# Patient Record
Sex: FEMALE | Race: WHITE | Employment: UNEMPLOYED | URBAN - METROPOLITAN AREA
[De-identification: names, ages, dates, MRNs, and addresses within clinical notes are randomized per-mention and may not be internally consistent; named-entity substitution may affect disease eponyms.]

---

## 2018-08-08 PROBLEM — R06.2 INSPIRATORY WHEEZE ON EXAMINATION: Status: ACTIVE | Noted: 2018-08-08

## 2018-10-24 DIAGNOSIS — M89.8X5 PAIN OF LEFT FEMUR: Primary | ICD-10-CM

## 2018-10-25 ENCOUNTER — HOSPITAL ENCOUNTER (OUTPATIENT)
Dept: GENERAL RADIOLOGY | Age: 43
Discharge: HOME | End: 2018-10-27
Payer: COMMERCIAL

## 2018-10-25 ENCOUNTER — OFFICE VISIT (OUTPATIENT)
Dept: ORTHOPEDIC SURGERY | Age: 43
End: 2018-10-25
Payer: COMMERCIAL

## 2018-10-25 VITALS
BODY MASS INDEX: 20.49 KG/M2 | HEIGHT: 64 IN | HEART RATE: 76 BPM | WEIGHT: 120 LBS | DIASTOLIC BLOOD PRESSURE: 94 MMHG | SYSTOLIC BLOOD PRESSURE: 140 MMHG

## 2018-10-25 DIAGNOSIS — M25.552 LEFT HIP PAIN: Primary | ICD-10-CM

## 2018-10-25 DIAGNOSIS — M89.8X5 PAIN OF LEFT FEMUR: ICD-10-CM

## 2018-10-25 DIAGNOSIS — M70.62 TROCHANTERIC BURSITIS, LEFT HIP: ICD-10-CM

## 2018-10-25 PROCEDURE — 20610 DRAIN/INJ JOINT/BURSA W/O US: CPT | Performed by: PHYSICIAN ASSISTANT

## 2018-10-25 PROCEDURE — 99213 OFFICE O/P EST LOW 20 MIN: CPT | Performed by: PHYSICIAN ASSISTANT

## 2018-10-25 PROCEDURE — 6360000002 HC RX W HCPCS

## 2018-10-25 PROCEDURE — 99212 OFFICE O/P EST SF 10 MIN: CPT | Performed by: PHYSICIAN ASSISTANT

## 2018-10-25 PROCEDURE — 2500000003 HC RX 250 WO HCPCS

## 2018-10-25 PROCEDURE — 73552 X-RAY EXAM OF FEMUR 2/>: CPT

## 2018-10-26 RX ORDER — LIDOCAINE HYDROCHLORIDE AND EPINEPHRINE BITARTRATE 20; .01 MG/ML; MG/ML
2.5 INJECTION, SOLUTION SUBCUTANEOUS ONCE
Status: COMPLETED | OUTPATIENT
Start: 2018-10-26 | End: 2018-10-26

## 2018-10-26 RX ORDER — BUPIVACAINE HYDROCHLORIDE 2.5 MG/ML
2 INJECTION, SOLUTION INFILTRATION; PERINEURAL ONCE
Status: COMPLETED | OUTPATIENT
Start: 2018-10-26 | End: 2018-10-26

## 2018-10-26 RX ORDER — TRIAMCINOLONE ACETONIDE 40 MG/ML
40 INJECTION, SUSPENSION INTRA-ARTICULAR; INTRAMUSCULAR ONCE
Status: COMPLETED | OUTPATIENT
Start: 2018-10-26 | End: 2018-10-26

## 2018-10-26 RX ADMIN — LIDOCAINE HYDROCHLORIDE AND EPINEPHRINE BITARTRATE 2.5 ML: 20; .01 INJECTION, SOLUTION SUBCUTANEOUS at 10:01

## 2018-10-26 RX ADMIN — TRIAMCINOLONE ACETONIDE 40 MG: 40 INJECTION, SUSPENSION INTRA-ARTICULAR; INTRAMUSCULAR at 10:00

## 2018-10-26 RX ADMIN — BUPIVACAINE HYDROCHLORIDE 5 MG: 2.5 INJECTION, SOLUTION INFILTRATION; PERINEURAL at 09:59

## 2018-10-26 NOTE — PROGRESS NOTES
bloody. Hematologic\Lymphatic:  negative for bleeding, petechiae,   Genitourinary: Negative for hematuria and difficulty urinating. Musculoskeletal: Negative for neck pain and stiffness. Negative for back pain, see HPI  Skin: Negative for pallor, rash and wound. Neurological: Negative for dizziness, tremors, seizures, weakness, light-headedness, no TIA or stroke symptoms. No numbness and headaches. Psychiatric/Behavioral: Negative. OBJECTIVE:      Physical Examination:   General appearance: alert, well appearing, and in no distress,  normal appearing weight. No visible signs of trauma   Mental status: alert, oriented to person, place, and time, normal mood, behavior, speech, dress, motor activity, and thought processes  Abdomen: soft, nondistended  Resp:   resp easy and unlabored, no audible wheezes note, normal symmetrical expansion of both hemithoraces  Cardiac: distal pulses palpable, skin and extremities well perfused  Neurological: alert, oriented X3, normal speech, no focal findings or movement disorder noted, motor and sensory grossly normal bilaterally, normal muscle tone, no tremors, strength 5/5, normal gait and station  HEENT: normochephalic atraumatic, external ears and eyes normal, sclera normal, neck supple, no nasal discharge. Extremities:   peripheral pulses normal, no edema, redness or tenderness in the calves   Skin: normal coloration, no rashes or open wounds, no suspicious skin lesions noted  Psych: Affect euthymic   Musculoskeletal:   Extremity:  left Lower Extremity Exam:    Incision over left hip, well healed, no signs of infection   Skin intact, not broken down. No erythema/induration/fluctuence/warmth present. Minimal swelling present over the greater trochanteric bursa, over incision. No ecchymosis present. Tender to palpation over greater trochanteric bursa, none over the groin, or anterior thigh.    Active range of motion 0-100 degrees of left knee, no pain with hip ROM,

## 2021-08-20 ENCOUNTER — TELEPHONE (OUTPATIENT)
Dept: ORTHOPEDIC SURGERY | Age: 46
End: 2021-08-20

## 2021-08-20 DIAGNOSIS — S72.102D CLOSED PERTROCHANTERIC FRACTURE OF LEFT FEMUR WITH ROUTINE HEALING, SUBSEQUENT ENCOUNTER: Primary | ICD-10-CM

## 2021-09-09 ENCOUNTER — HOSPITAL ENCOUNTER (OUTPATIENT)
Dept: GENERAL RADIOLOGY | Age: 46
Discharge: HOME | End: 2021-09-11
Payer: COMMERCIAL

## 2021-09-09 ENCOUNTER — OFFICE VISIT (OUTPATIENT)
Dept: ORTHOPEDIC SURGERY | Age: 46
End: 2021-09-09
Payer: COMMERCIAL

## 2021-09-09 DIAGNOSIS — S72.102D CLOSED PERTROCHANTERIC FRACTURE OF LEFT FEMUR WITH ROUTINE HEALING, SUBSEQUENT ENCOUNTER: ICD-10-CM

## 2021-09-09 DIAGNOSIS — S72.142S CLOSED INTERTROCHANTERIC FRACTURE OF HIP, LEFT, SEQUELA: ICD-10-CM

## 2021-09-09 DIAGNOSIS — M16.32 OSTEOARTHRITIS RESULTING FROM HIP DYSPLASIA ON ONE SIDE, LEFT: Primary | ICD-10-CM

## 2021-09-09 PROCEDURE — G8428 CUR MEDS NOT DOCUMENT: HCPCS | Performed by: PHYSICIAN ASSISTANT

## 2021-09-09 PROCEDURE — 99204 OFFICE O/P NEW MOD 45 MIN: CPT | Performed by: PHYSICIAN ASSISTANT

## 2021-09-09 PROCEDURE — 73552 X-RAY EXAM OF FEMUR 2/>: CPT

## 2021-09-09 PROCEDURE — G8421 BMI NOT CALCULATED: HCPCS | Performed by: PHYSICIAN ASSISTANT

## 2021-09-09 PROCEDURE — 4004F PT TOBACCO SCREEN RCVD TLK: CPT | Performed by: PHYSICIAN ASSISTANT

## 2021-09-09 PROCEDURE — 72170 X-RAY EXAM OF PELVIS: CPT

## 2021-09-09 PROCEDURE — 99212 OFFICE O/P EST SF 10 MIN: CPT | Performed by: PHYSICIAN ASSISTANT

## 2021-09-09 NOTE — PROGRESS NOTES
Jovanny Brasher is a 55 y.o. female who presents for follow up of L Hip and Left Knee Pain    SURGEON: Dr. Abby Buenrostro, DO  Date last seen in office: 10/25/2018    Symptoms: worse  New complaints: Pt expressed having more difficulty with Left hip compared to Left Knee. Pt has a hard time with ADL's due to increase pain of FWB. Weightbearing: left lower Partial weight bearing      Assistive device Straight cane  Participating in therapy (location if yes)?  no    Refills Needed: None  Order/Referral Needed: no

## 2021-09-09 NOTE — PATIENT INSTRUCTIONS
You will need to be medically cleared before surgery by your family doctor. Please call your doctor to set up an appointment for medical clearance as soon as possible and have the office fax your medical clearance to :   Selena CalderaMike [phone]   ATTN:  1 Steve Orozco    Call internal medicine center [phone] for appointment    Pre op therapy- [facility]    1. Your surgery is scheduled for Left Hip CIARA Total Arthroplasty when medically cleared with Dr. Mateusz Humphrey, DO at the [address] will need to report to Preop area  that morning at 90 minutes before surgery. 2. Plan to stay overnight after surgery  3. Preadmission Testing (PAT) department at [facility] will contact you with all the details prior to surgery. 4. Nothing to eat or drink after midnight the night before surgery. You may take a pain pill and any other medicine PAT instructs you to take with small sip of water if needed. 5. You will need to attend Joint Education Class prior to surgery- Tuesday AM from 10-11 am  6. Continue with ice and elevation to reduce swelling  7. Weightbearing as tolerated left lower, use assistive devices as needed  8. Take pain medicine as instructed  9. Call office with any question or concerns: [phone]. Hold Lovenox/Aspirin the day of surgery. Hold all NSAIDs 7 days prior to surgery  11. Pre Op COVID 19 testing    ALL TOTAL JOINT PATIENTS WILL BE WEANED OFF ANY NARCOTIC MEDICATION GIVEN POST OPERATIVELY BY AT THE LATEST 3 WEEKS FROM DATE OF SURGERY    Due to increased risk of infections, it is important to plan for getting treatment for significant dental diseases (including tooth extractions, root canal and periodontal work) before your total joint surgery.   Routine teeth cleaning should be delayed until you are 3 months post op

## 2021-09-10 NOTE — PROGRESS NOTES
Orthopaedic H&P Note    Jovanny rBasher is a 55 y.o. female, her YOB: 1975 with the following history as recorded in [facility]:      Patient Active Problem List    Diagnosis Date Noted    Osteoarthritis resulting from hip dysplasia on one side, left 09/09/2021    Closed intertrochanteric fracture of hip, left, sequela 09/09/2021    Inspiratory wheeze on examination 08/08/2018     Current Outpatient Medications   Medication Sig Dispense Refill    baclofen (LIORESAL) 10 MG tablet Take 2 tablets by mouth 3 times daily 180 tablet 2    traMADol (ULTRAM) 50 MG tablet Take 1 tablet by mouth every 8 hours as needed for Pain for up to 90 days. 90 tablet 2    oxybutynin (DITROPAN-XL) 5 MG extended release tablet Take 1 tablet by mouth daily 90 tablet 1     No current facility-administered medications for this visit. Allergies: Patient has no known allergies. No past medical history on file. Past Surgical History:   Procedure Laterality Date    HIP SURGERY Left 07/09/2016    IM NAILING LEFT HIP    TUBAL LIGATION       No family history on file. Social History     Tobacco Use    Smoking status: Current Every Day Smoker     Packs/day: 0.50     Types: Cigarettes    Smokeless tobacco: Never Used    Tobacco comment: quit smoking   Substance Use Topics    Alcohol use: No                             Chief Complaint   Patient presents with    Hip Pain     8/10 pain. Pt expressed having consistent pain. Pt has pain in middle lower back. Pt has numbness and tingling in Left Hip. Negative crepitus.  Knee Pain     5/10 pain. Pt expressed pain is only present with ambulation. SUBJECTIVE: Jovanny Brasher is here today for their left Hip. The patient has had pain for sometime, but last 6 months or so it has become more severe. 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  She does have history of L hip intertrochanteric fracture treated with CMN in 2016 with Dr. Venita Haddad. Virginia Byrnes has tried multiple conservative treatment. Has had therapy in the past, that worked at first, but the pain persisted. Patient has chronically been managed by physiatrist for pain management, bracing has been attempted to the knee. She has had appropriate modifications to the shoe for leg length discrepancy, ambulates with a SPC at all times. The pain is described as typical arthritic pain, achy in the joint, becoming more severe with stairs and any twisting motion of the left Hip. Rest makes the left Hip better along with NSAIDs and over the counter analgesics, but states they are now less effective. Patient can ambulate less than 1 block prior to pain limiting their function. Virginia Byrnes is having difficulty with ADLs, and states this pain is limiting here activity decreasing their quality of life. She would like to discuss ROBEL. Review of Systems   Constitutional: Negative for fever, chills, diaphoresis, appetite change and fatigue. HENT: Negative for dental issues, hearing loss and tinnitus. Negative for congestion, sinus pressure, sneezing, sore throat. Negative for headache. Eyes: Negative for visual disturbance, blurred and double vision. Negative for pain, discharge, redness and itching  Respiratory: Negative for cough, shortness of breath and wheezing. Cardiovascular: Negative for chest pain, palpitations and leg swelling. No dyspnea on exertion   Gastrointestinal:   Negative for nausea, vomiting, abdominal pain, diarrhea, constipation  or black or bloody. Hematologic\Lymphatic:  negative for bleeding, petechiae,   Genitourinary: Negative for hematuria and difficulty urinating. Musculoskeletal: Negative for neck pain and stiffness. Mild for back pain, negative joint swelling and gait problem. Skin: Negative for pallor, rash and wound.    Neurological: Negative for dizziness, tremors, seizures, weakness, light-headedness, no TIA or stroke symptoms. No numbness and headaches. Psychiatric/Behavioral: Negative. Physical Examination:   General appearance: alert, well appearing, and in no distress,  normal appearing weight  Mental status: alert, oriented to person, place, and time, normal mood, behavior, speech, dress, motor activity, and thought processes  Abdomen: soft, nondistended   Resp:   resp easy and unlabored, no audible wheezes note  Cardiac: distal pulses palpable, skin well perfused  Neurological: alert, oriented X3, normal speech, no focal findings or movement disorder noted, motor and sensory grossly normal bilaterally, normal muscle tone, no tremors, strength 5/5, normal gait and station  HEENT: normochephalic atraumatic, external ears and eyes normal, sclera normal, neck supple  Extremities:   peripheral pulses normal, no edema, redness or tenderness in the calves   Skin: normal coloration, no rashes or open wounds, no suspicious skin lesions noted  Psych: Affect euthymic   Musculoskeletal:    Extremity:  left Lower Extremity  ·  Skin is intact circumferentially  · Previous surgical incisions well healed  · Genu valgum to the LLE  · approximately 2\" shortening on the left lower extremity  · +TTP to the left hemipelvis  · Pain with IR/ER of the left hip  · Compartments soft and compressible  · Palpable dorsalis pedis and posterior tibialis pulse, brisk cap refill to toes, foot warm and perfused  · Sensation intact to light touch in sural/deep peroneal/superficial peroneal/saphenous/posterior tibial nerve distributions to foot/ankle  · Demonstrates active ankle plantar/dorsiflexion/great toe extension       XR: 9/9/21- AP pelvis and 2 views of the left femur demonstrates intact CMN at the left femur. Dysplastic appearing left hip with shallow left acetabulum and flattened left femoral head. Degenerative arthropathy present as well. ASSESSMENT:     Diagnosis Orders   1.  Osteoarthritis resulting from hip dysplasia on one side, left  CT HIP LEFT WO CONTRAST    [facility]   2. Closed intertrochanteric fracture of hip, left, sequela         Discussion:  The patient would like to proceed with total left Hip arthroplasty when cleared by their PCP. Discussed use of the CIARA for her procedure given her underlying dysplastic acetabulum. Carmenmarleni Echeverria understands the risks include but are not limited to infection, injuries to the blood vessel and nerves, bleeding, continued pain and non relief of pain, aseptic loosening dislocation, limb length discrepancy, arthrofibrosis of the joint, further operation, deep venous thrombosis, pulmonary embolism and fracture, heart attack and death. Татьяна operative plan discussed, need for anticoagulation for DVT/PE prophylaxis, need for physical therapy, office follow up visits, and possible rehab stay. It was also explained patient will need to take a prophylactic dose of ATB prior to any bowel, dental or mouth procedures, including dental cleaning, for length of the implant. Did review surgery prosthesis with model with patient as well. Pain control was also discussed and the expectation is to have patient off narcotic pain meds around 3 weeks post operatively for a total joint arthroplasty     Elective Orthopedic Surgery Checklist:    [x] Nicotine cessation education- If nonunion surgery, needs negative Nicotine blood work  [] Established with a PCP-- patient to re-establish with [address]    [x] No pending dental treatments prior to total joint  [x] Joint Education Class Needed   [] Any other areas of concern that need addressed prior to surgery: NA        PLAN:  You will need to be medically cleared before surgery by your family doctor.  Please call your doctor to set up an appointment for medical clearance as soon as possible and have the office fax your medical clearance to :   [phone]   ATTN:  1 Steve Orozco    Call internal medicine center [phone] for appointment    Pre op therapy- [facility]    1. Your surgery is scheduled for Left Hip CIARA Total Arthroplasty when medically cleared with Dr. Lorri Garcia, DO at the main [address] [place] [address] will need to report to Preop area  that morning at 90 minutes before surgery. 2. Plan to stay overnight after surgery  3. Preadmission Testing (PAT) department at [address] [place] [address] will contact you with all the details prior to surgery. 4. Nothing to eat or drink after midnight the night before surgery. You may take a pain pill and any other medicine PAT instructs you to take with small sip of water if needed. 5. You will need to attend Joint Education Class prior to surgery- Tuesday AM from 10-11 am  6. Continue with ice and elevation to reduce swelling  7. Weightbearing as tolerated left lower, use assistive devices as needed  8. Take pain medicine as instructed  9. Call office with any question or concerns: [phone]. Hold Lovenox/Aspirin the day of surgery. Hold all NSAIDs 7 days prior to surgery  11. Pre Op COVID 19 testing  Planning for post op DVT prophylaxis with Aspirin as well as nonpharmacologic use of Home SCDs  Patient to also to be supplied with cryotherapy with compression post operatively. Electronically signed by Yandel Vivas PA-C on 9/10/2021 at 9:41 AM  Note: This report was completed using [employer] voiced recognition software. Every effort has been made to ensure accuracy; however, inadvertent computerized transcription errors may be present.

## 2021-09-23 ENCOUNTER — HOSPITAL ENCOUNTER (OUTPATIENT)
Dept: PHYSICAL THERAPY | Age: 46
Setting detail: THERAPIES SERIES
Discharge: HOME | End: 2021-09-23
Payer: COMMERCIAL

## 2021-09-23 PROCEDURE — 97161 PT EVAL LOW COMPLEX 20 MIN: CPT | Performed by: PHYSICAL THERAPIST

## 2021-09-23 ASSESSMENT — PAIN DESCRIPTION - FREQUENCY: FREQUENCY: CONTINUOUS

## 2021-09-23 ASSESSMENT — PAIN DESCRIPTION - LOCATION: LOCATION: HIP;KNEE;LEG

## 2021-09-23 ASSESSMENT — PAIN DESCRIPTION - DESCRIPTORS: DESCRIPTORS: ACHING;SHARP

## 2021-09-23 ASSESSMENT — PAIN SCALES - GENERAL: PAINLEVEL_OUTOF10: 7

## 2021-09-23 ASSESSMENT — PAIN DESCRIPTION - PAIN TYPE: TYPE: CHRONIC PAIN

## 2021-09-23 ASSESSMENT — PAIN DESCRIPTION - ORIENTATION: ORIENTATION: LEFT

## 2021-09-23 NOTE — PROGRESS NOTES
Physical Therapy  Initial Assessment  Date: 2021  Patient Name: Jen Blackmon  MRN: [id number]  : 1975          Restrictions       Subjective   General  Chart Reviewed: Yes  Referring Practitioner: Haley  Diagnosis: M16.32 (ICD-10-CM) - Osteoarthritis resulting from hip dysplasia on one side, left  PT Visit Information  PT Insurance Information: [facility]  Total # of Visits to Date: 1  Subjective  Subjective: Patient presents to PT with c/o L hip pain. Patient reports that this a chronic condition that is progressing. She explains that she was injured in [address] [place] [address] which resulted in hindered mechanics of LLE. She reports she adapted to the hindered gait however fell in  which resulted in L femur fracture. Since the femur fracture, symptoms have worsened. She cont to ambulate with SC. Pain remains lateral hip with radiating pain into knee region. Patient does admit to occasional buckling of knee. Falls do occur with last fall approx 5-6 days ago. Pt takes tramadol for pain relief. Pain Screening  Patient Currently in Pain: Yes  Pain Assessment  Pain Assessment: 0-10  Pain Level: 7 (7-10/10)  Pain Type: Chronic pain  Pain Location: Hip;Knee;Leg  Pain Orientation: Left  Pain Descriptors: Aching; Sharp  Pain Frequency: Continuous  Vital Signs  Patient Currently in Pain: Yes    Objective     Observation/Palpation  Palpation: pain with palpation across LS region with significant pain across lateral aspect L hip  Observation: generalized atrophy noted across LLE  Edema: no obvious edema    AROM RLE (degrees)  RLE AROM: WFL  AROM LLE (degrees)  LLE General AROM: L hip- WFL; L knee flexion- WFL; L knee ext- -15*; L ankle PF- WFL ; L ankle DF -30* (-17* passive)  Spine  Lumbar: WFL    Strength RLE  Strength RLE: WFL  Strength LLE  Comment: L hip flex/add- grossly 4/5; L hip abd- 3 to 3+/5; L knee- grossly 4/5; L ankle 3- to 3/5 WARs     Additional Measures  Flexibility: Limited L hamstring flexibility hindering knee and ankle motions  Sensation  Overall Sensation Status: WFL             Ambulation  Ambulation?: Yes  Ambulation 1  Device: Single point cane  Assistance: Modified Independent  Quality of Gait: Patient ambulates with SC: significant L knee flexion with ankle PF throughout; limited step-to mechanics throughout  Balance  Sitting - Static: Good  Sitting - Dynamic: Good  Standing - Static: Good  Standing - Dynamic: Good;-                         Assessment   Conditions Requiring Skilled Therapeutic Intervention  Body structures, Functions, Activity limitations: Decreased functional mobility ; Decreased ROM; Decreased strength; Increased pain  Assessment: pt presents to PT due to L hip pain/limited LLE function; pt to have PT prior to ROBEL to improve ROM/strength of LLE to improve post op outcomes  Prognosis: Fair  Decision Making: Low Complexity  REQUIRES PT FOLLOW UP: Yes         Plan   Plan  Times per week: 2x/week x 6 weeks for aquatic therapy  Current Treatment Recommendations: Aquatics    Goals  Short term goals  Time Frame for Short term goals: 3 weeks  Short term goal 1: increase strength of LLE by 1/2 manual muscle grade to improve overall functions  Short term goal 2: increase L hamstring flexibility allowing for increased L Knee ext to < -10* to improve functional gait  Short term goal 3: decrease pain to < 5/10 across LLE with activity  Long term goals  Time Frame for Long term goals : 6 weeks  Long term goal 1: increase strength of LLE by 1 manual muscle grade to improve overall functions  Long term goal 2: increase L hamstring flexibility allowing for increased L Knee ext to < -5* to improve functional gait  Long term goal 3: decrease pain to < 3/10 across LLE with activity  Long term goal 4: pt demonstrates independence with HEP  Patient Goals   Patient goals : to reduce left LE pain       Therapy Time   Individual Concurrent Group Co-treatment   Time In 1005         Time Out 1050 Minutes Wine 47, PT

## 2021-10-26 ENCOUNTER — HOSPITAL ENCOUNTER (OUTPATIENT)
Dept: PHYSICAL THERAPY | Age: 46
Setting detail: THERAPIES SERIES
Discharge: HOME | End: 2021-10-26
Payer: COMMERCIAL

## 2021-10-26 PROCEDURE — 97113 AQUATIC THERAPY/EXERCISES: CPT

## 2021-10-26 NOTE — PROGRESS NOTES
[facility]  Phone: [phone] Fax: [phone]        Physical Therapy Aquatic Flow Sheet   Date:  10/26/2021    Patient Name:  Yeison Young    :  1975  Restrictions/Precautions:    Diagnosis:  M16.32 (ICD-10-CM) - Osteoarthritis resulting from hip dysplasia on one side, left  Treatment Diagnosis:  M16.32 (ICD-10-CM) - Osteoarthritis resulting from hip dysplasia on one side, left  [facility]   Referring Physician:  Haley  Time In:1030  Time Out:1130    Subjective:Patient presents to PT with c/o L hip pain. Patient reports that this a chronic condition that is progressing. She explains that she was injured in [address] in  which resulted in hindered mechanics of LLE. She reports she adapted to the hindered gait however fell in  which resulted in L femur fracture. Since the femur fracture, symptoms have worsened. She cont to ambulate with SC. Pain remains lateral hip with radiating pain into knee region. Patient does admit to occasional buckling of knee. Falls do occur with last fall approx 5-6 days ago. Pt takes tramadol for pain relief.       Key  B= Belt DB= Dumbells T= Theratube   H= Hydrotone N= Noodles W= Weights   P= Paddles S= Speedo equipment K= Kickboard     Exercises/Activities   Warm-up/Amb Minutes  Exercises      Slow forward   5  HR/TR      Slow sideways  5  Marches      Slow backwards  5  Mini-squats      Medium forward    4-way SLR      Medium sideways    Hip circles/fig 8      Small shuffle    Hamstring curls      Jog    Knee extension  5    Braiding    Pelvic tilts      Bicycling  5  Scap squeezes      Marching  5  Shoulder flex/ext      Functional    Shoulder abd/add      Step    Shoulder H. abd/add      Lifting    Shoulder IR/ER      Hand to opp knee    Rowing      Push down squat    Bilateral pull down      UE PNF    Push/pull      LE PNF    Push downs      Wall push ups    Arm circles      SLS    Elbow flex/ext          Chin tuck      Stretching    UT shrugs/rolls      Gastroc/Soleus    Rocking horse      Hamstring          SKTC    Other      Piriformis          Hip flexor          Ladder pull          Pec stretch          Post deltoid           Timed Code Treatment Minutes:  30    Total Treatment Minutes:  30    Treatment/Activity Tolerance:  [] Patient tolerated treatment well [x] Patient limited by fatique  [x] Patient limited by pain [] Patient limited by other medical complications  [x] Other: LLE General AROM: L hip- WFL; L knee flexion- WFL; L knee ext- -15*; L ankle PF- WFL ; L ankle DF -30* (-17* passive)    PQuality of Gait: Patient ambulates with SC: significant L knee flexion with ankle PF throughout; limited step-to mechanics throughout  Prognosis: [] Good [x] Fair  [] Poor    Patient Requires Follow-up: [x] Yes  [] No    Plan:   [x] Continue per plan of care [] Alter current plan (see comments)  [] Plan of care initiated [] Hold pending MD visit [] Discharge    Treatment Charges: Mins Units   Initial Evaluation     Re-Evaluation     Ther Exercise         TE     Aquatic Treatment 30 2   Ther Activities        TA     Gait Training          GT     Neuro Re-education NR     Modalities     Non-Billable Service Time     Other     Total Time/Units 30 2       Electronically signed by:  Ashley Urbina PTA [id number]

## 2021-10-28 ENCOUNTER — HOSPITAL ENCOUNTER (OUTPATIENT)
Dept: PHYSICAL THERAPY | Age: 46
Setting detail: THERAPIES SERIES
Discharge: HOME | End: 2021-10-28
Payer: COMMERCIAL

## 2021-10-28 PROCEDURE — 97113 AQUATIC THERAPY/EXERCISES: CPT

## 2021-10-28 NOTE — PROGRESS NOTES
[facility]  Phone: [phone] Fax: [phone]        Physical Therapy Aquatic Flow Sheet   Date:  10/28/2021    Patient Name:  Joey Maria    :  1975  Restrictions/Precautions:    Diagnosis:  M16.32 (ICD-10-CM) - Osteoarthritis resulting from hip dysplasia on one side, left  Treatment Diagnosis:  M16.32 (ICD-10-CM) - Osteoarthritis resulting from hip dysplasia on one side, left  Insurance/Certification information: [facility]   Referring Physician:  Haley  Time In:1030  Time Out:1130  2x/week x 6 weeks for aquatic therapy    Key  B= Belt DB= Dumbells T= Theratube   H= Hydrotone N= Noodles W= Weights   P= Paddles S= Speedo equipment K= Kickboard     Exercises/Activities   Warm-up/Amb Minutes  Exercises  Minutes    Slow forward  5  HR/TR  5    Slow sideways  5  Marches  5    Slow backwards  5  Mini-squats  5    Medium forward    4-way SLR  5    Medium sideways    Hip circles/fig 8  5    Small shuffle    Hamstring curls  5    Jog    Knee extension  5    Braiding    Pelvic tilts  5    Bicycling  5  Scap squeezes      Marching  5  Shoulder flex/ext      Functional    Shoulder abd/add      Step    Shoulder H. abd/add      Lifting    Shoulder IR/ER      Hand to opp knee    Rowing      Push down squat    Bilateral pull down      UE PNF    Push/pull      LE PNF    Push downs      Wall push ups    Arm circles      SLS    Elbow flex/ext          Chin tuck      Stretching    UT shrugs/rolls      Gastroc/Soleus    Rocking horse      Hamstring          SKTC    Other      Piriformis          Hip flexor          Ladder pull          Pec stretch          Post deltoid           Timed Code Treatment Minutes:  60    Total Treatment Minutes: 60    Treatment/Activity Tolerance:  [] Patient tolerated treatment well [x] Patient limited by fatique  [x] Patient limited by pain [] Patient limited by other medical complications  [x] Other: LLE General AROM: L hip- WFL; L knee flexion- WFL; L knee ext- -15*; L ankle PF- WFL ; L ankle DF -30* (-17* passive)    PQuality of Gait: Patient ambulates with SC: significant L knee flexion with ankle PF throughout; limited step-to mechanics throughout  Prognosis: [] Good [x] Fair  [] Poor    Patient Requires Follow-up: [x] Yes  [] No    Plan:   [x] Continue per plan of care [] Alter current plan (see comments)  [] Plan of care initiated [] Hold pending MD visit [] Discharge    Treatment Charges: Mins Units   Initial Evaluation     Re-Evaluation     Ther Exercise         TE     Aquatic Treatment 60 4   Ther Activities        TA     Gait Training          GT     Neuro Re-education NR     Modalities     Non-Billable Service Time     Other     Total Time/Units 60 4       Electronically signed by:  Kirsten Ivy PTA [id number]

## 2021-11-04 ENCOUNTER — APPOINTMENT (OUTPATIENT)
Dept: PHYSICAL THERAPY | Age: 46
End: 2021-11-04
Payer: COMMERCIAL

## 2021-11-09 ENCOUNTER — HOSPITAL ENCOUNTER (OUTPATIENT)
Dept: PHYSICAL THERAPY | Age: 46
Setting detail: THERAPIES SERIES
Discharge: HOME | End: 2021-11-09
Payer: COMMERCIAL

## 2021-11-09 PROCEDURE — 97113 AQUATIC THERAPY/EXERCISES: CPT

## 2021-11-09 NOTE — PROGRESS NOTES
[facility]  Phone: [phone] Fax: [phone]        Physical Therapy Aquatic Flow Sheet   Date:  2021    Patient Name:  Saniya Reno    :  1975  Restrictions/Precautions:    Diagnosis:  M16.32 (ICD-10-CM) - Osteoarthritis resulting from hip dysplasia on one side, left  Treatment Diagnosis:  M16.32 (ICD-10-CM) - Osteoarthritis resulting from hip dysplasia on one side, left  Insurance/Certification information: careelvis   Referring Physician:  Haley  Time In:1030  Time Out:1130  2x/wPatient presents to PT with c/o L hip pain. Patient reports that this a chronic condition that is progressing. She explains that [place] was [place] [address] [place] [address] which resulted in hindered mechanics of LLE. She reports she adapted to the hindered gait however fell in  which resulted in L femur fracture. Since the femur fracture, symptoms have worsened. She cont to ambulate with SC. Pain remains lateral hip with radiating pain into knee region. Patient does admit to occasional buckling of knee. Falls do occur with last fall approx 5-6 days ago.  Pt takes tramadol for pain reliefeek x 6 weeks for aquatic therapy    Key  B= Belt DB= Dumbells T= Theratube   H= Hydrotone N= Noodles W= Weights   P= Paddles S= Speedo equipment K= Kickboard     Exercises/Activities   Warm-up/Amb Minutes  Exercises  Minutes    Slow forward  5  HR/TR  5    Slow sideways  5  Marches  5    Slow backwards  5  Mini-squats  5    Medium forward    4-way SLR  5    Medium sideways    Hip circles/fig 8  5    Small shuffle    Hamstring curls  5    Jog    Knee extension  5    Braiding    Pelvic tilts  5    Bicycling  5  Scap squeezes      Marching  5  Shoulder flex/ext      Functional    Shoulder abd/add      Step    Shoulder H. abd/add      Lifting    Shoulder IR/ER      Hand to opp knee    Rowing      Push down squat    Bilateral pull down      UE PNF    Push/pull      LE PNF    Push downs      Wall push ups Arm circles      SLS    Elbow flex/ext          Chin tuck      Stretching    UT shrugs/rolls      Gastroc/Soleus    Rocking horse      Hamstring          SKTC    Other      Piriformis          Hip flexor          Ladder pull          Pec stretch          Post deltoid         Quality of Gait: Patient ambulates with SC: significant L knee flexion with ankle PF throughout; limited step-to mechanics throughout  Timed Code Treatment Minutes:  60    Total Treatment Minutes: 60    Treatment/Activity Tolerance:  [] Patient tolerated treatment well [x] Patient limited by fatique  [x] Patient limited by pain [] Patient limited by other medical complications  [x] Other: LLE General AROM: L hip- WFL; L knee flexion- WFL; L knee ext- -15*; L ankle PF- WFL ; L ankle DF -30* (-17* passive)    PQuality of Gait: Patient ambulates with SC: significant L knee flexion with ankle PF throughout; limited step-to mechanics throughout  Prognosis: [] Good [x] Fair  [] Poor    Patient Requires Follow-up: [x] Yes  [] No    Plan:   [x] Continue per plan of care [] Alter current plan (see comments)  [] Plan of care initiated [] Hold pending MD visit [] Discharge    Treatment Charges: Mins Units   Initial Evaluation     Re-Evaluation     Ther Exercise         TE     Aquatic Treatment 60 4   Ther Activities        TA     Gait Training          GT     Neuro Re-education NR     Modalities     Non-Billable Service Time     Other     Total Time/Units 60 4       Electronically signed by:  Heriberto Chen PTA [id number]

## 2021-11-10 ENCOUNTER — OFFICE VISIT (OUTPATIENT)
Dept: PRIMARY CARE CLINIC | Age: 46
End: 2021-11-10
Payer: COMMERCIAL

## 2021-11-10 VITALS
SYSTOLIC BLOOD PRESSURE: 122 MMHG | DIASTOLIC BLOOD PRESSURE: 80 MMHG | BODY MASS INDEX: 20.73 KG/M2 | OXYGEN SATURATION: 99 % | HEIGHT: 64 IN | WEIGHT: 121.4 LBS | TEMPERATURE: 97.1 F | HEART RATE: 68 BPM | RESPIRATION RATE: 19 BRPM

## 2021-11-10 DIAGNOSIS — Z00.00 ANNUAL PHYSICAL EXAM: Primary | ICD-10-CM

## 2021-11-10 DIAGNOSIS — Z11.59 NEED FOR HEPATITIS C SCREENING TEST: ICD-10-CM

## 2021-11-10 DIAGNOSIS — Z01.818 PRE-OP EVALUATION: ICD-10-CM

## 2021-11-10 DIAGNOSIS — M54.50 ACUTE LEFT-SIDED LOW BACK PAIN WITHOUT SCIATICA: ICD-10-CM

## 2021-11-10 DIAGNOSIS — Z12.11 COLON CANCER SCREENING: ICD-10-CM

## 2021-11-10 DIAGNOSIS — M62.81 MUSCLE WEAKNESS OF LEFT ARM: ICD-10-CM

## 2021-11-10 DIAGNOSIS — Z11.4 ENCOUNTER FOR SCREENING FOR HIV: ICD-10-CM

## 2021-11-10 DIAGNOSIS — D64.9 ANEMIA, UNSPECIFIED TYPE: ICD-10-CM

## 2021-11-10 DIAGNOSIS — R29.898 WEAKNESS OF LEFT LEG: ICD-10-CM

## 2021-11-10 DIAGNOSIS — Z00.00 ANNUAL PHYSICAL EXAM: ICD-10-CM

## 2021-11-10 LAB
ALBUMIN SERPL-MCNC: 3.8 G/DL (ref 3.5–5.2)
ALP BLD-CCNC: 59 U/L (ref 35–104)
ALT SERPL-CCNC: 7 U/L (ref 0–32)
ANION GAP SERPL CALCULATED.3IONS-SCNC: 12 MMOL/L (ref 7–16)
AST SERPL-CCNC: 19 U/L (ref 0–31)
BILIRUB SERPL-MCNC: 0.2 MG/DL (ref 0–1.2)
BUN BLDV-MCNC: 6 MG/DL (ref 6–20)
CALCIUM SERPL-MCNC: 9.4 MG/DL (ref 8.6–10.2)
CHLORIDE BLD-SCNC: 102 MMOL/L (ref 98–107)
CHOLESTEROL, TOTAL: 187 MG/DL (ref 0–199)
CO2: 21 MMOL/L (ref 22–29)
CREAT SERPL-MCNC: 0.7 MG/DL (ref 0.5–1)
FERRITIN: 9 NG/ML
GFR AFRICAN AMERICAN: >60
GFR NON-AFRICAN AMERICAN: >60 ML/MIN/1.73
GLUCOSE BLD-MCNC: 74 MG/DL (ref 74–99)
HCT VFR BLD CALC: 29.2 % (ref 34–48)
HDLC SERPL-MCNC: 43 MG/DL
HEMOGLOBIN: 8.6 G/DL (ref 11.5–15.5)
IRON SATURATION: 6 % (ref 15–50)
IRON: 20 MCG/DL (ref 37–145)
LDL CHOLESTEROL CALCULATED: 127 MG/DL (ref 0–99)
MCH RBC QN AUTO: 22.1 PG (ref 26–35)
MCHC RBC AUTO-ENTMCNC: 29.5 % (ref 32–34.5)
MCV RBC AUTO: 74.9 FL (ref 80–99.9)
PDW BLD-RTO: 21.3 FL (ref 11.5–15)
PLATELET # BLD: 495 E9/L (ref 130–450)
PMV BLD AUTO: 8.8 FL (ref 7–12)
POTASSIUM SERPL-SCNC: 3.8 MMOL/L (ref 3.5–5)
RBC # BLD: 3.9 E12/L (ref 3.5–5.5)
SODIUM BLD-SCNC: 135 MMOL/L (ref 132–146)
TOTAL IRON BINDING CAPACITY: 360 MCG/DL (ref 250–450)
TOTAL PROTEIN: 7 G/DL (ref 6.4–8.3)
TRIGL SERPL-MCNC: 86 MG/DL (ref 0–149)
VLDLC SERPL CALC-MCNC: 17 MG/DL
WBC # BLD: 3.7 E9/L (ref 4.5–11.5)

## 2021-11-10 PROCEDURE — G8484 FLU IMMUNIZE NO ADMIN: HCPCS | Performed by: STUDENT IN AN ORGANIZED HEALTH CARE EDUCATION/TRAINING PROGRAM

## 2021-11-10 PROCEDURE — 99396 PREV VISIT EST AGE 40-64: CPT | Performed by: STUDENT IN AN ORGANIZED HEALTH CARE EDUCATION/TRAINING PROGRAM

## 2021-11-10 SDOH — ECONOMIC STABILITY: FOOD INSECURITY: WITHIN THE PAST 12 MONTHS, YOU WORRIED THAT YOUR FOOD WOULD RUN OUT BEFORE YOU GOT MONEY TO BUY MORE.: NEVER TRUE

## 2021-11-10 SDOH — ECONOMIC STABILITY: FOOD INSECURITY: WITHIN THE PAST 12 MONTHS, THE FOOD YOU BOUGHT JUST DIDN'T LAST AND YOU DIDN'T HAVE MONEY TO GET MORE.: NEVER TRUE

## 2021-11-10 ASSESSMENT — PATIENT HEALTH QUESTIONNAIRE - PHQ9
SUM OF ALL RESPONSES TO PHQ QUESTIONS 1-9: 0
SUM OF ALL RESPONSES TO PHQ QUESTIONS 1-9: 0
1. LITTLE INTEREST OR PLEASURE IN DOING THINGS: 0
SUM OF ALL RESPONSES TO PHQ QUESTIONS 1-9: 0
2. FEELING DOWN, DEPRESSED OR HOPELESS: 0
SUM OF ALL RESPONSES TO PHQ9 QUESTIONS 1 & 2: 0

## 2021-11-10 ASSESSMENT — SOCIAL DETERMINANTS OF HEALTH (SDOH): HOW HARD IS IT FOR YOU TO PAY FOR THE VERY BASICS LIKE FOOD, HOUSING, MEDICAL CARE, AND HEATING?: NOT VERY HARD
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
## 2021-11-11 ENCOUNTER — HOSPITAL ENCOUNTER (OUTPATIENT)
Dept: PHYSICAL THERAPY | Age: 46
Setting detail: THERAPIES SERIES
Discharge: HOME | End: 2021-11-11
Payer: COMMERCIAL

## 2021-11-11 PROBLEM — M54.50 ACUTE LEFT-SIDED LOW BACK PAIN WITHOUT SCIATICA: Status: ACTIVE | Noted: 2021-11-11

## 2021-11-11 PROBLEM — R29.898 WEAKNESS OF LEFT LEG: Status: ACTIVE | Noted: 2021-11-11

## 2021-11-11 PROBLEM — M62.81 MUSCLE WEAKNESS OF LEFT ARM: Status: ACTIVE | Noted: 2021-11-11

## 2021-11-11 LAB
HEPATITIS C ANTIBODY INTERPRETATION: NORMAL
HIV-1 AND HIV-2 ANTIBODIES: NORMAL

## 2021-11-11 PROCEDURE — 97113 AQUATIC THERAPY/EXERCISES: CPT

## 2021-11-11 NOTE — PROGRESS NOTES
[facility]  Phone: [phone] Fax: [phone]        Physical Therapy Aquatic Flow Sheet   Date:  2021    Patient Name:  Jeet French    :  1975  Restrictions/Precautions:    Diagnosis:  M16.32 (ICD-10-CM) - Osteoarthritis resulting from hip dysplasia on one side, left  Treatment Diagnosis:  M16.32 (ICD-10-CM) - Osteoarthritis resulting from hip dysplasia on one side, left  Ins[facility]Certification information: [facility]   Referring Physician:  Haley  Time In:1030  Time Out:1130    Key  B= Belt DB= Dumbells T= Theratube   H= Hydrotone N= Noodles W= Weights   P= Paddles S= Speedo equipment K= Kickboard     Exercises/Activities   Warm-up/Amb Minutes  Exercises  Minutes    Slow forward  5  HR/TR  5    Slow sideways  5  Marches  5    Slow backwards  5  Mini-squats  5    Medium forward    4-way SLR  5    Medium sideways    Hip circles/fig 8  5    Small shuffle    Hamstring curls  5    Jog    Knee extension  5    Braiding    Pelvic tilts  5    Bicycling  5  Scap squeezes      Marching  5  Shoulder flex/ext      Functional    Shoulder abd/add      Step    Shoulder H. abd/add      Lifting    Shoulder IR/ER      Hand to opp knee    Rowing      Push down squat    Bilateral pull down      UE PNF    Push/pull      LE PNF    Push downs      Wall push ups    Arm circles      SLS    Elbow flex/ext          Chin tuck      Stretching    UT shrugs/rolls      Gastroc/Soleus    Rocking horse      Hamstring          SKTC    Other      Piriformis          Hip flexor          Ladder pull          Pec stretch          Post deltoid         Quality of Gait: Patient ambulates with SC: significant L knee flexion with ankle PF throughout; limited step-to mechanics throughout  Timed Code Treatment Minutes:  60    Total Treatment Minutes: 60    Treatment/Activity Tolerance:  [] Patient tolerated treatment well [x] Patient limited by fatique  [x] Patient limited by pain [] Patient limited by other medical complications  [x] Other: LLE General AROM: L hip- WFL; L knee flexion- WFL; L knee ext- -15*; L ankle PF- WFL ; L ankle DF -30* (-17* passive)    PQuality of Gait: Patient ambulates with SC: significant L knee flexion with ankle PF throughout; limited step-to mechanics throughout  Prognosis: [] Good [x] Fair  [] Poor    Patient Requires Follow-up: [x] Yes  [] No    Plan:   [x] Continue per plan of care [] Alter current plan (see comments)  [] Plan of care initiated [] Hold pending MD visit [] Discharge    Treatment Charges: Mins Units   Initial Evaluation     Re-Evaluation     Ther Exercise         TE     Aquatic Treatment 60 4   Ther Activities        TA     Gait Training          GT     Neuro Re-education NR     Modalities     Non-Billable Service Time     Other     Total Time/Units 60 4       Electronically signed by:  Lincoln Renteria PTA [id number]

## 2021-11-16 ENCOUNTER — HOSPITAL ENCOUNTER (OUTPATIENT)
Dept: PHYSICAL THERAPY | Age: 46
Setting detail: THERAPIES SERIES
Discharge: HOME | End: 2021-11-16
Payer: COMMERCIAL

## 2021-11-16 PROCEDURE — 97113 AQUATIC THERAPY/EXERCISES: CPT

## 2021-11-16 NOTE — PROGRESS NOTES
[facility]  Phone: [phone] Fax: [phone]        Physical Therapy Aquatic Flow Sheet   Date:  2021    Patient Name:  Amanda Turpin    :  1975  Restrictions/Precautions:    Diagnosis:  M16.32 (ICD-10-CM) - Osteoarthritis resulting from hip dysplasia on one side, left  Treatment Diagnosis:  M16.32 (ICD-10-CM) - Osteoarthritis resulting from hip dysplasia on one side, left  Insurance/Certification information: candie   Referring Physician:  Haley  Time In:1030  Time Out:1130    Pt at 6/10- visits. Pt denies improvement since initiation of therapy. Pt calling her surgeon for THR surgical date.         Key  B= Belt DB= Dumbells T= Theratube   H= Hydrotone N= Noodles W= Weights   P= Paddles S= Speedo equipment K= Kickboard     Exercises/Activities   Warm-up/Amb Minutes  Exercises  Minutes    Slow forward  5  HR/TR  5    Slow sideways  5  Marches  5    Slow backwards  5  Mini-squats  5    Medium forward    4-way SLR  5    Medium sideways    Hip circles/fig 8  5    Small shuffle    Hamstring curls  5    Jog    Knee extension  5    Braiding    Pelvic tilts  5    Bicycling  5  Scap squeezes      Marching  5  Shoulder flex/ext      Functional    Shoulder abd/add      Step    Shoulder H. abd/add      Lifting    Shoulder IR/ER      Hand to opp knee    Rowing      Push down squat    Bilateral pull down      UE PNF    Push/pull      LE PNF    Push downs      Wall push ups    Arm circles      SLS    Elbow flex/ext          Chin tuck      Stretching    UT shrugs/rolls      Gastroc/Soleus    Rocking horse      Hamstring          SKTC    Other      Piriformis          Hip flexor          Ladder pull          Pec stretch          Post deltoid         Quality of Gait: Patient ambulates with SC: significant L knee flexion with ankle PF throughout; limited step-to mechanics throughout  Timed Code Treatment Minutes:  60    Total Treatment Minutes: 60    Treatment/Activity Tolerance:  [] Patient tolerated treatment well [x] Patient limited by fatique  [x] Patient limited by pain [] Patient limited by other medical complications  [x] Other: LLE General AROM: L hip- WFL; L knee flexion- WFL; L knee ext- -15*; L ankle PF- WFL ; L ankle DF -30* (-17* passive)    PQuality of Gait: Patient ambulates with SC: significant L knee flexion with ankle PF throughout; limited step-to mechanics throughout  Prognosis: [] Good [x] Fair  [] Poor    Patient Requires Follow-up: [x] Yes  [] No    Plan:   [x] Continue per plan of care [] Alter current plan (see comments)  [] Plan of care initiated [] Hold pending MD visit [] Discharge    Treatment Charges: Mins Units   Initial Evaluation     Re-Evaluation     Ther Exercise         TE     Aquatic Treatment 60 4   Ther Activities        TA     Gait Training          GT     Neuro Re-education NR     Modalities     Non-Billable Service Time     Other     Total Time/Units 60 4       Electronically signed by:  Tessa Henao PTA [id number]

## 2021-11-18 ENCOUNTER — APPOINTMENT (OUTPATIENT)
Dept: PHYSICAL THERAPY | Age: 46
End: 2021-11-18
Payer: COMMERCIAL

## 2021-11-23 ENCOUNTER — HOSPITAL ENCOUNTER (OUTPATIENT)
Dept: PHYSICAL THERAPY | Age: 46
Setting detail: THERAPIES SERIES
Discharge: HOME | End: 2021-11-23
Payer: COMMERCIAL

## 2021-11-23 PROCEDURE — 97113 AQUATIC THERAPY/EXERCISES: CPT

## 2021-11-23 NOTE — PROGRESS NOTES
[facility]  Phone: [phone] Fax: [phone]        Physical Therapy Aquatic Flow Sheet   Date:  2021    Patient Name:  Luther Rai    :  1975  Restrictions/Precautions:    Diagnosis:  M16.32 (ICD-10-CM) - Osteoarthritis resulting from hip dysplasia on one side, left  Treatment Diagnosis:  M16.32 (ICD-10-CM) - Osteoarthritis resulting from hip dysplasia on one side, left  Insurance/Certification information: careelvis   Referring Physician:  Haley  Time In:1030  Time Out:1100    Pt here at  visits. She c/o \"constant L hip pain after each pool visit. \"  She has NOT heard from her surgeon about THR surgery. Modified today's session to decrease TE intensity. Hopefully pt will have less pain completing 30 minutes as compared to 60.       Key  B= Belt DB= Dumbells T= Theratube   H= Hydrotone N= Noodles W= Weights   P= Paddles S= Speedo equipment K= Kickboard     Exercises/Activities   Warm-up/Amb Minutes  Exercises  Minutes    Slow forward  5  HR/TR      Slow sideways  5  Marches      Slow backwards  5  Mini-squats     Medium forward    4-way SLR      Medium sideways    Hip circles/fig 8      Small shuffle    Hamstring curls     Jog    Knee extension  5    Braiding    Pelvic tilts      Bicycling  5  Scap squeezes      Marching  5  Shoulder flex/ext      Functional    Shoulder abd/add      Step    Shoulder H. abd/add      Lifting    Shoulder IR/ER      Hand to opp knee    Rowing      Push down squat    Bilateral pull down      UE PNF    Push/pull      LE PNF    Push downs      Wall push ups    Arm circles      SLS    Elbow flex/ext          Chin tuck      Stretching    UT shrugs/rolls      Gastroc/Soleus    Rocking horse      Hamstring          SKTC    Other      Piriformis          Hip flexor          Ladder pull          Pec stretch          Post deltoid         Quality of Gait: Patient ambulates with SC: significant L knee flexion with ankle PF throughout; limited step-to mechanics throughout  Timed Code Treatment Minutes:  30    Total Treatment Minutes: 30    Treatment/Activity Tolerance:  [] Patient tolerated treatment well [x] Patient limited by fatique  [x] Patient limited by pain [] Patient limited by other medical complications  [x] Other Minimal progress toward goals-decrease TE intensity by 30 minutes to help pt tolerate treatment better-pt receptive!    : LLE General AROM: L hip- WFL; L knee flexion- WFL; L knee ext- -15*; L ankle PF- WFL ; L ankle DF -30* (-17* passive)    PQuality of Gait: Patient ambulates with SC: significant L knee flexion with ankle PF throughout; limited step-to mechanics throughout  Prognosis: [] Good [x] Fair  [] Poor    Patient Requires Follow-up: [x] Yes  [] No    Plan:   [x] Continue per plan of care [] Alter current plan (see comments)  [] Plan of care initiated [] Hold pending MD visit [] Discharge    Treatment Charges: Mins Units   Initial Evaluation     Re-Evaluation     Ther Exercise         TE     Aquatic Treatment 60 4   Ther Activities        TA     Gait Training          GT     Neuro Re-education NR     Modalities     Non-Billable Service Time     Other     Total Time/Units 60 4       Electronically signed by:  Eddie Talley PTA [id number]

## 2021-12-01 ENCOUNTER — OFFICE VISIT (OUTPATIENT)
Dept: SURGERY | Age: 46
End: 2021-12-01
Payer: COMMERCIAL

## 2021-12-01 VITALS
HEIGHT: 64 IN | HEART RATE: 79 BPM | SYSTOLIC BLOOD PRESSURE: 147 MMHG | BODY MASS INDEX: 21 KG/M2 | WEIGHT: 123 LBS | DIASTOLIC BLOOD PRESSURE: 98 MMHG | TEMPERATURE: 97 F

## 2021-12-01 DIAGNOSIS — Z12.11 ENCOUNTER FOR SCREENING COLONOSCOPY: Primary | ICD-10-CM

## 2021-12-01 PROCEDURE — G8427 DOCREV CUR MEDS BY ELIG CLIN: HCPCS | Performed by: SURGERY

## 2021-12-01 PROCEDURE — G8484 FLU IMMUNIZE NO ADMIN: HCPCS | Performed by: SURGERY

## 2021-12-01 PROCEDURE — G8420 CALC BMI NORM PARAMETERS: HCPCS | Performed by: SURGERY

## 2021-12-01 PROCEDURE — 99243 OFF/OP CNSLTJ NEW/EST LOW 30: CPT | Performed by: SURGERY

## 2021-12-01 NOTE — PATIENT INSTRUCTIONS
[facility] COLONOSCOPY PREPARATION    Instructions for Clear liquid diet  Definition  A clear liquid diet consists of clear liquids, such as water, broth and plain gelatin, that are easily digested and leave no undigested residue in your intestinal tract. Your doctor may prescribe a clear liquid diet before certain medical procedures or if you have certain digestive problems. Because a clear liquid diet can't provide you with adequate calories and nutrients, it shouldn't be continued for more than a few days. Purpose  A clear liquid diet is often used before tests, procedures or surgeries that require no food in your stomach or intestines, such as before colonoscopy. It may also be recommended as a short-term diet if you have certain digestive problems, such as nausea, vomiting or diarrhea, or after certain types of surgery. Diet details  A clear liquid diet helps maintain adequate hydration, provides some important electrolytes, such as sodium and potassium, and gives some energy at a time when a full diet isn't possible or recommended. The following foods are allowed in a clear liquid diet:    Plain water    Fruit juices without pulp, such as apple juice, white grape juice.  Strained lemonade    Clear, fat-free broth (bouillon or consomme)    Clear sodas     Plain gelatin (Not RED or PURPLE)   Honey    Ice pops without bits of fruit or fruit pulp    Tea or coffee without milk or cream   ** NOTHING RED OR PURPLE    Any foods not on the above list should be avoided. Also, for certain tests, such as colon exams, your doctor may ask you to avoid liquids or gelatin with red coloring.      A typical menu on the clear liquid diet may look like this:   Breakfast:  1 glass fruit juice  1 cup coffee or tea (without dairy products)  1 cup broth  1 bowl gelatin     Snack:  1 glass fruit juice  1 bowl gelatin     Lunch:  1 glass fruit juice  1 glass water  1 cup broth  1 bowl gelatin     Snack:  1 ice pop (without fruit pulp)  1 cup coffee or tea (without dairy products) or a soft drink     Dinner:  1 cup juice or water  1 cup broth  1 bowl gelatin  1 cup coffee or tea     Purchase this over the counter:  1. GATORADE/Clear liquid (64 ounces)   Pick these up at the pharmacy:   [address] [place] [address] 238 grams (over the counter only)    The DAY BEFORE your colonoscopy:   Drink only clear liquids. (Absolutely no solid food)    COLONOSCOPY PREP:  3 PM: Mix the entire bottle of MIRALAX into the 64 ounces of GATORADE. (Put half the bottle in each 32 ounce bottle). Shake the solution until fully dissolved. Drink an 8 ounce glass every 30 minutes until the solution is gone. **DO NOT EAT OR DRINK ANYTHING AFTER MIDNIGHT**          The DAY OF your colonoscopy: You may take any necessary medications with a sip of water. Bring along someone to take you home. REMEMBER: The preparation is very important. An adequate clean out allows for the best evaluation of your entire colon. During the prep, using baby wipes may ease some of your discomfort.     You should NOT plan on working or driving the rest of the day due to sedation given at the procedure

## 2021-12-02 ENCOUNTER — TELEPHONE (OUTPATIENT)
Dept: SURGERY | Age: 46
End: 2021-12-02

## 2021-12-02 ENCOUNTER — APPOINTMENT (OUTPATIENT)
Dept: PHYSICAL THERAPY | Age: 46
End: 2021-12-02
Payer: COMMERCIAL

## 2021-12-02 RX ORDER — SODIUM CHLORIDE 9 MG/ML
INJECTION, SOLUTION INTRAVENOUS CONTINUOUS
Status: CANCELLED | OUTPATIENT
Start: 2021-12-02

## 2021-12-02 ASSESSMENT — ENCOUNTER SYMPTOMS
COUGH: 0
DIARRHEA: 0
VOMITING: 0
EYE REDNESS: 0
NAUSEA: 0
BACK PAIN: 0
ABDOMINAL PAIN: 0
WHEEZING: 0
PHOTOPHOBIA: 0
CONSTIPATION: 0
SORE THROAT: 0
BLOOD IN STOOL: 0
SHORTNESS OF BREATH: 0

## 2021-12-02 NOTE — TELEPHONE ENCOUNTER
Prior Authorization Form:      DEMOGRAPHICS:                     Patient Name:  John Single  Patient :  1975            Insurance:  Payor: Audie Gunn / Plan: Frederic Henry / Product Type: *No Product type* /   Insurance ID Number:    Payor/Plan Subscr  Sex Relation Sub.  Ins. ID Effective Group Num   1. [id number] - * MERA LEONG 1975 Female Self [id number] [address] [facility] [address]         DIAGNOSIS & PROCEDURE:                       Procedure/Operation: colonoscopy           CPT Code: 66849    Diagnosis:  screen    ICD10 Code: z12.11    Location:  [facility]    Surgeon:  Nitesh Dia INFORMATION:                          Date: 22    Time: robert              Anesthesia:  MAC/TIVA                                                       Status:  Outpatient        Special Comments:         Electronically signed by Army Nini MA on 2021 at 3:23 PM

## 2021-12-02 NOTE — PROGRESS NOTES
Consult Note    Dear Marshall Gomez DO, thank you for referring Manjula Silverio for evaluation. Reason for Consult: Colonoscopy    HISTORY OF PRESENT ILLNESS:    The patient is a 55 y.o. female who presents with need for colonoscopy. She is never had previous colonoscopy. She denies family history of colon cancer. She denies diarrhea, constipation, or rectal bleeding. No past medical history on file. Past Surgical History:   Procedure Laterality Date    HIP SURGERY Left 07/09/2016    IM NAILING LEFT HIP    TUBAL LIGATION         Prior to Admission medications    Medication Sig Start Date End Date Taking? Authorizing Provider   diclofenac sodium (VOLTAREN) 1 % GEL Apply 2 g topically 4 times daily 11/10/21  Yes Soruav Hoskins DO   baclofen (LIORESAL) 10 MG tablet Take 2 tablets by mouth 3 times daily 10/25/21  Yes Pedrito Cooley MD   traMADol (ULTRAM) 50 MG tablet Take 1 tablet by mouth every 8 hours as needed for Pain for up to 90 days.  10/25/21 1/23/22 Yes Rony Pineda MD   oxybutynin (DITROPAN-XL) 5 MG extended release tablet Take 1 tablet by mouth daily 4/21/21  Yes Rony Pineda MD       Scheduled Meds:  ContinuousInfusions:  PRN Meds:    No Known Allergies    Social History     Socioeconomic History    Marital status:      Spouse name: audrey    Number of children: 10    Years of education: Not on file    Highest education level: Not on file   Occupational History    Not on file   Tobacco Use    Smoking status: Current Every Day Smoker     Packs/day: 0.50     Types: Cigarettes    Smokeless tobacco: Never Used    Tobacco comment: quit smoking   Substance and Sexual Activity    Alcohol use: No    Drug use: No    Sexual activity: Yes     Partners: Male   Other Topics Concern    Not on file   Social History Narrative    Not on file     Social Determinants of Health     Financial Resource Strain: Low Risk     Difficulty of Paying Living Expenses: Not very other systems reviewed and are negative. Physical Exam:  Vitals:    12/01/21 1330   BP: (!) 147/98   Pulse: 79   Temp: 97 °F (36.1 °C)   Weight: 123 lb (55.8 kg)   Height: 5' 4\" (1.626 m)       General: Well nourished, well developed, no acute distress  Eyes:  PERRL   Conjunctiva unremarkable   ENT:  TM's intact bilaterally, no effusion   Nasal:  No mucosal edema     No nasal drainage   Oral:  mucosa moist and pink   Neck:  Supple   No palpable cervical lymphoadenopathy   Thyroid without mass or enlargement  Resp: Lungs CTAB   Equal and adequate air exchange without accessory muscle use   No rales, rhonchi or wheeze  CV: S1S2 RRR   No murmur   Intact distal pulses   No edema  GI: Abdomen Soft, non tender, non distended   Normoactive bowel sounds   No palpable hepatosplenomegaly  MS: Physiologic ROM of all extremities    Intact distal pulses   No clubbing or cyanosis   Skin Warm and dry; no rash or lesion  Neuro: Alert and oriented; normal and intact dtr's   Psych: Euthymic mood, congruent affect      No results found. Assessment/Plan:  3 77-year-old female presents for screening colonoscopy. We will plan for this in the near future. The risks and benefits of the procedure were explained to the patient, including risks of bleeding and perforation. The patient expressed understanding of these risks.         Electronically signed by Wilma Wallace DO on 12/2/21 at 12:01 PM EST

## 2021-12-07 ENCOUNTER — HOSPITAL ENCOUNTER (OUTPATIENT)
Dept: PHYSICAL THERAPY | Age: 46
Setting detail: THERAPIES SERIES
Discharge: HOME | End: 2021-12-07
Payer: COMMERCIAL

## 2021-12-07 PROCEDURE — 97113 AQUATIC THERAPY/EXERCISES: CPT

## 2021-12-07 NOTE — PROGRESS NOTES
[facility]  Phone: [phone] Fax: [phone]        Physical Therapy Aquatic Flow Sheet   Date:  2021    Patient Name:  Kevin Torres    :  1975  Restrictions/Precautions:    Diagnosis:  M16.32 (ICD-10-CM) - Osteoarthritis resulting from hip dysplasia on one side, left  Treatment Diagnosis:  M16.32 (ICD-10-CM) - Osteoarthritis resulting from hip dysplasia on one side, left  Insurance/Certification information: careelvis   Referring Physician:  Haley  Time In:1030  Time Out:1130    Pt here at   visits. She c/o \"constant L hipt. \"  She has NOT hear from her surgeon about THR surgery. Modified today's session to increase TE intensity. pain completing 60 minutes as compared to 30.       Key  B= Belt DB= Dumbells T= Theratube   H= Hydrotone N= Noodles W= Weights   P= Paddles S= Speedo equipment K= Kickboard     Exercises/Activities   Warm-up/Amb Minutes  Exercises  Minutes    Slow forward  5  HR/TR  5    Slow sideways  5  Marches  5    Slow backwards  5  Mini-squats 5    Medium forward    4-way SLR  5    Medium sideways    Hip circles/fig 8  5    Small shuffle    Hamstring curls 5    Jog    Knee extension  5    Braiding    Pelvic tilts      Bicycling  5  Scap squeezes      Marching  5  Shoulder flex/ext      Functional    Shoulder abd/add      Step    Shoulder H. abd/add      Lifting    Shoulder IR/ER      Hand to opp knee    Rowing      Push down squat    Bilateral pull down      UE PNF    Push/pull      LE PNF    Push downs      Wall push ups    Arm circles      SLS    Elbow flex/ext          Chin tuck      Stretching    UT shrugs/rolls      Gastroc/Soleus    Rocking horse      Hamstring          SKTC    Other      Piriformis          Hip flexor          Ladder pull          Pec stretch          Post deltoid         Quality of Gait: Patient ambulates with SC: significant L knee flexion with ankle PF throughout; limited step-to mechanics throughout  Timed Code Treatment Minutes:  60    Total Treatment Minutes: 60    Treatment/Activity Tolerance:  [] Patient tolerated treatment well [x] Patient limited by fatique  [x] Patient limited by pain [] Patient limited by other medical complications  [x] Other Minimal progress toward goals-: LLE General AROM: L hip- WFL; L knee flexion- WFL; L knee ext- -15*; L ankle PF- WFL ; L ankle DF -30* (-17* passive)    PQuality of Gait: Patient ambulates with SC: significant L knee flexion with ankle PF throughout; limited step-to mechanics throughout  Prognosis: [] Good [x] Fair  [] Poor    Patient Requires Follow-up: [x] Yes  [] No    Plan:   [x] Continue per plan of care [] Alter current plan (see comments)  [] Plan of care initiated [] Hold pending MD visit [] Discharge    Treatment Charges: Mins Units   Initial Evaluation     Re-Evaluation     Ther Exercise         TE     Aquatic Treatment 60 4   Ther Activities        TA     Gait Training          GT     Neuro Re-education NR     Modalities     Non-Billable Service Time     Other     Total Time/Units 60 4       Electronically signed by:  Shawn Benito PTA [id number]

## 2021-12-14 ENCOUNTER — HOSPITAL ENCOUNTER (OUTPATIENT)
Dept: PHYSICAL THERAPY | Age: 46
Setting detail: THERAPIES SERIES
Discharge: HOME | End: 2021-12-14
Payer: COMMERCIAL

## 2021-12-14 ENCOUNTER — TELEPHONE (OUTPATIENT)
Dept: ORTHOPEDIC SURGERY | Age: 46
End: 2021-12-14

## 2021-12-14 PROCEDURE — 97113 AQUATIC THERAPY/EXERCISES: CPT

## 2021-12-14 NOTE — PROGRESS NOTES
[facility]  Phone: [phone] Fax: [phone]        Physical Therapy Aquatic Flow Sheet   Date:  2021    Patient Name:  Zach Ball    :  1975  Restrictions/Precautions:    Diagnosis:  M16.32 (ICD-10-CM) - Osteoarthritis resulting from hip dysplasia on one side, left  Treatment Diagnosis:  M16.32 (ICD-10-CM) - Osteoarthritis resulting from hip dysplasia on one side, left  Insurance/Certification information: careelvis   Referring Physician:  Haley  Time In:1030  Time Out:1130    Pt here at   visits. Pt denies much progress toward pain relief. SHe explains \"feels better when I am in the pool; pain returns upon exit of pool.       Key  B= Belt DB= Dumbells T= Theratube   H= Hydrotone N= Noodles W= Weights   P= Paddles S= Speedo equipment K= Kickboard     Exercises/Activities   Warm-up/Amb Minutes  Exercises  Minutes    Slow forward  5  HR/TR  5    Slow sideways  5  Marches  5    Slow backwards  5  Mini-squats 5    Medium forward    4-way SLR  5    Medium sideways    Hip circles/fig 8  5    Small shuffle    Hamstring curls 5    Jog    Knee extension  5    Braiding    Pelvic tilts      Bicycling  5  Scap squeezes      Marching  5  Shoulder flex/ext      Functional    Shoulder abd/add      Step    Shoulder H. abd/add      Lifting    Shoulder IR/ER      Hand to opp knee    Rowing      Push down squat    Bilateral pull down      UE PNF    Push/pull      LE PNF    Push downs      Wall push ups    Arm circles      SLS    Elbow flex/ext          Chin tuck      Stretching    UT shrugs/rolls      Gastroc/Soleus    Rocking horse      Hamstring          SKTC    Other      Piriformis          Hip flexor          Ladder pull          Pec stretch          Post deltoid         Quality of Gait: Patient ambulates with SC: significant L knee flexion with ankle PF throughout; limited step-to mechanics throughout  Timed Code Treatment Minutes:  60    Total Treatment Minutes: 60    Treatment/Activity Tolerance:  [] Patient tolerated treatment well [x] Patient limited by fatique  [x] Patient limited by pain [] Patient limited by other medical complications  [x] Other:  Pt gives G+ effort.        Minimal progress toward goals-: LLE General AROM: L hip- WFL; L knee flexion- WFL; L knee ext- -15*; L ankle PF- WFL ; L ankle DF -30* (-17* passive)-NO CHange    PQuality of Gait: Patient ambulates with SC: significant L knee flexion with ankle PF throughout; limited step-to mechanics throughout  Prognosis: [] Good [x] Fair  [] Poor    Patient Requires Follow-up: [x] Yes  [] No    Plan:   [x] Continue per plan of care [] Alter current plan (see comments)  [] Plan of care initiated [] Hold pending MD visit [] Discharge    Treatment Charges: Mins Units   Initial Evaluation     Re-Evaluation     Ther Exercise         TE     Aquatic Treatment 60 4   Ther Activities        TA     Gait Training          GT     Neuro Re-education NR     Modalities     Non-Billable Service Time     Other     Total Time/Units 60 4       Electronically signed by:  Cristina Roe PTA [id number]

## 2021-12-14 NOTE — TELEPHONE ENCOUNTER
Patient called in to discuss Left Hip surgery by Dr. Santos Byrd at [facility] using [address] [place]. Advised patient February 2022 is the earliest this can be done at [facility] and patient said that was fine. Informed patient I will work on a surgery date this week and get back to her later this week. Patient verbalized understanding and said she will wait for my call.

## 2021-12-16 ENCOUNTER — HOSPITAL ENCOUNTER (OUTPATIENT)
Dept: PHYSICAL THERAPY | Age: 46
Setting detail: THERAPIES SERIES
End: 2021-12-16
Payer: COMMERCIAL

## 2021-12-16 PROCEDURE — 97113 AQUATIC THERAPY/EXERCISES: CPT

## 2021-12-19 PROCEDURE — 97113 AQUATIC THERAPY/EXERCISES: CPT

## 2021-12-23 NOTE — TELEPHONE ENCOUNTER
Called patient to advise her of her surgery date and time. She did not answer and he voicemail box is not set up yet to receive messages. Our office will be closed after today due to the holidays. I will try to reach her again when the office opens back up on 12-.

## 2021-12-23 NOTE — TELEPHONE ENCOUNTER
[facility]ola rep notified. Dr. Rosa Ra office notified. Patient notified. Called [facility] Surgery Scheduling back. Corrected surgery to Left Conversion of Prior Hip Surgery to Total Hip Arthroplasty using Robotic Assisted CIARA. Surgery Scheduling is changing the time of this surgery to 1:45 pm on 2-8-2022 to be 2nd case to allow for more time. She will need to arrive at the hospital at 11:45 am the day of surgery. She needs to contact [facility] PAT Department to go over surgery instructions #[phone]. I do have a another case I originally had scheduled to follow after this one. So cases are now flipped.

## 2021-12-23 NOTE — TELEPHONE ENCOUNTER
Soonest date available at [facility] for [facility] is 2nd week of Feb 2022. Spoke with Makayla Brown at [facility] Surgery Scheduling today. Surgery is now scheduled with Dr. Zenobia Bennett at [facility] for 2-8-2022 @ 11 am for Robotic Assisted CIARA Left Total Hip Arthroplasty.

## 2022-01-08 ENCOUNTER — HOSPITAL ENCOUNTER (OUTPATIENT)
Age: 47
Discharge: HOME | End: 2022-01-10
Payer: COMMERCIAL

## 2022-01-08 DIAGNOSIS — Z01.818 PREOP TESTING: ICD-10-CM

## 2022-01-08 PROCEDURE — U0003 INFECTIOUS AGENT DETECTION BY NUCLEIC ACID (DNA OR RNA); SEVERE ACUTE RESPIRATORY SYNDROME CORONAVIRUS 2 (SARS-COV-2) (CORONAVIRUS DISEASE [COVID-19]), AMPLIFIED PROBE TECHNIQUE, MAKING USE OF HIGH THROUGHPUT TECHNOLOGIES AS DESCRIBED BY CMS-2020-01-R: HCPCS

## 2022-01-10 LAB
SARS-COV-2: NOT DETECTED
SOURCE: NORMAL

## 2022-01-11 ENCOUNTER — ANESTHESIA EVENT (OUTPATIENT)
Dept: ENDOSCOPY | Age: 47
End: 2022-01-11
Payer: COMMERCIAL

## 2022-01-11 ASSESSMENT — LIFESTYLE VARIABLES: SMOKING_STATUS: 1

## 2022-01-11 NOTE — ANESTHESIA PRE PROCEDURE
Department of Anesthesiology  Preprocedure Note       Name:  Larissa Bravo   Age:  55 y.o.  :  1975                                          MRN:  [id number]         Date:  2022      Surgeon: Declan Mcleod):  Walt Rodriguez DO    Procedure: Procedure(s):  COLORECTAL CANCER SCREENING, NOT HIGH RISK    Medications prior to admission:   Prior to Admission medications    Medication Sig Start Date End Date Taking? Authorizing Provider   diclofenac sodium (VOLTAREN) 1 % GEL Apply 2 g topically 4 times daily 11/10/21   Bernadine Hoskins DO   baclofen (LIORESAL) 10 MG tablet Take 2 tablets by mouth 3 times daily 10/25/21   Wesly Ramey MD   traMADol (ULTRAM) 50 MG tablet Take 1 tablet by mouth every 8 hours as needed for Pain for up to 90 days. 10/25/21 1/23/22  Wesly Ramey MD   oxybutynin (DITROPAN-XL) 5 MG extended release tablet Take 1 tablet by mouth daily 21   Wesly Ramey MD       Current medications:    No current facility-administered medications for this encounter. Current Outpatient Medications   Medication Sig Dispense Refill    diclofenac sodium (VOLTAREN) 1 % GEL Apply 2 g topically 4 times daily 150 g 0    baclofen (LIORESAL) 10 MG tablet Take 2 tablets by mouth 3 times daily 180 tablet 2    traMADol (ULTRAM) 50 MG tablet Take 1 tablet by mouth every 8 hours as needed for Pain for up to 90 days.  90 tablet 2    oxybutynin (DITROPAN-XL) 5 MG extended release tablet Take 1 tablet by mouth daily 90 tablet 1       Allergies:  No Known Allergies    Problem List:    Patient Active Problem List   Diagnosis Code    Inspiratory wheeze on examination R06.2    Osteoarthritis resulting from hip dysplasia on one side, left M16.32    Closed intertrochanteric fracture of hip, left, sequela S72.142S    Weakness of left leg R29.898    Muscle weakness of left arm M62.81    Acute left-sided low back pain without sciatica M54.50       Past Medical History:  No past medical history on file.    Past Surgical History:        Procedure Laterality Date    HIP SURGERY Left 07/09/2016    IM NAILING LEFT HIP    TUBAL LIGATION         Social History:    Social History     Tobacco Use    Smoking status: Current Every Day Smoker     Packs/day: 0.50     Types: Cigarettes    Smokeless tobacco: Never Used    Tobacco comment: quit smoking   Substance Use Topics    Alcohol use: No                                Ready to quit: Not Answered  Counseling given: Not Answered  Comment: quit smoking      Vital Signs (Current): There were no vitals filed for this visit. BP Readings from Last 3 Encounters:   12/01/21 (!) 147/98   11/10/21 122/80   10/25/18 (!) 140/94       NPO Status:                                                                                 BMI:   Wt Readings from Last 3 Encounters:   12/01/21 123 lb (55.8 kg)   11/10/21 121 lb 6.4 oz (55.1 kg)   10/25/18 120 lb (54.4 kg)     There is no height or weight on file to calculate BMI.    CBC:   Lab Results   Component Value Date    WBC 3.7 11/10/2021    RBC 3.90 11/10/2021    HGB 8.6 11/10/2021    HCT 29.2 11/10/2021    MCV 74.9 11/10/2021    RDW 21.3 11/10/2021     11/10/2021       CMP:   Lab Results   Component Value Date     11/10/2021    K 3.8 11/10/2021     11/10/2021    CO2 21 11/10/2021    BUN 6 11/10/2021    CREATININE 0.7 11/10/2021    GFRAA >60 11/10/2021    LABGLOM >60 11/10/2021    GLUCOSE 74 11/10/2021    PROT 7.0 11/10/2021    CALCIUM 9.4 11/10/2021    BILITOT 0.2 11/10/2021    ALKPHOS 59 11/10/2021    AST 19 11/10/2021    ALT 7 11/10/2021       POC Tests: No results for input(s): POCGLU, POCNA, POCK, POCCL, POCBUN, POCHEMO, POCHCT in the last 72 hours.     Coags:   Lab Results   Component Value Date    PROTIME 12.8 07/08/2016    INR 1.2 07/08/2016    APTT 27.1 07/08/2016       HCG (If Applicable):   Lab Results   Component Value Date    PREGTESTUR negative 07/08/2016 ABGs: No results found for: PHART, PO2ART, HTC6AIF, DDA0USH, BEART, R5CTVWKV     Type & Screen (If Applicable):  No results found for: LABABO, LABRH    Drug/Infectious Status (If Applicable):  No results found for: HIV, HEPCAB    COVID-19 Screening (If Applicable):   Lab Results   Component Value Date    COVID19 Not Detected 01/07/2022           Anesthesia Evaluation  Patient summary reviewed and Nursing notes reviewed no history of anesthetic complications:   Airway: Mallampati: III  TM distance: >3 FB   Neck ROM: full  Mouth opening: > = 3 FB Dental:          Pulmonary:Negative Pulmonary ROS and normal exam  breath sounds clear to auscultation  (+) current smoker    (-) COPD and sleep apnea                           Cardiovascular:  Exercise tolerance: good (>4 METS),       (-) past MI, CAD, CABG/stent, dysrhythmias,  angina,  CHF, orthopnea, PND,  AZEVEDO and no hyperlipidemia      Rhythm: regular  Rate: normal           Beta Blocker:  Not on Beta Blocker         Neuro/Psych:      (-) seizures and CVA            ROS comment: Weakness of left leg  Muscle weakness of left arm  Acute left-sided low back pain without sciatica    Hip dysplasia on the left with associated chronic pain managed with tramadol. Needs a cane for ambulation assistance. GI/Hepatic/Renal: Neg GI/Hepatic/Renal ROS           ROS comment: CRCS. Endo/Other:    (+) blood dyscrasia (8.6/29.2): anemia, arthritis: OA., .    (-) diabetes mellitus        Pt had no PAT visit        ROS comment: History of closed intertrochanteric fracture of left hip  Thrombocytosis Abdominal:         (-) obese       Vascular: negative vascular ROS. Other Findings:           Anesthesia Plan      MAC     ASA 3       Induction: intravenous. Anesthetic plan and risks discussed with patient. Plan discussed with EDBRA.             Cortes Parkinson DO   1/11/2022    DOS STAFF ADDENDUM:    Patient seen and examined, physical exam updated as needed, chart reviewed. NPO status confirmed. Anesthetic options and risks discussed with patient/legal guardian. Patient/legal guardian verbalized understanding and agrees to proceed.      RANJANA Mesa - CRNA  Staff CRNA  January 12, 2022  9:50 AM

## 2022-01-11 NOTE — PROGRESS NOTES
Have you been tested for COVID  Yes           Have you been told you were positive for COVID No  Have you had any known exposure to someone that is positive for COVID No  Do you have a cough                   No              Do you have shortness of breath No                 Do you have a sore throat            No                Are you having chills                    No                Are you having muscle aches. No                    Please come to the hospital wearing a mask and have your significant other wear a mask as well. Both of you should check your temperature before leaving to come here,  if it is 100 or higher please call [phone] for instruction. [facility] PRE-ADMISSION TESTING INSTRUCTIONS      ARRIVAL INSTRUCTIONS:  [x] Parking the day of Surgery is located in the Main Entrance lot. Upon entering the main door make an immediate right to the surgery reception desk.     [x] Bring photo ID and insurance card    [x] Please be sure to arrange for responsible adult to provide transportation to and from the hospital    [x] Please arrange for responsible adult to be with you for the 24 hour period post procedure due to having anesthesia      GENERAL INSTRUCTIONS:    [x] Nothing by mouth after midnight, including gum, candy, mints or water    [x] You may brush your teeth, but do not swallow any water    [x] Take medications as instructed with 1-2 oz of water    [x] Follow bowel prep as instructed per surgeon    [x] No tobacco products within 24 hours of surgery     [x] Jewelry, body piercing's, eyeglasses, contact lenses and dentures are not permitted into surgery (bring cases)      [x] Please do not wear any nail polish, make up or hair products on the day of surgery    [x] You can expect a call the business day prior to procedure to notify you if your arrival time changes    [x] If you receive a survey after surgery we would greatly appreciate your

## 2022-01-12 ENCOUNTER — HOSPITAL ENCOUNTER (OUTPATIENT)
Age: 47
Setting detail: OUTPATIENT SURGERY
Discharge: HOME | End: 2022-01-12
Attending: SURGERY | Admitting: SURGERY
Payer: COMMERCIAL

## 2022-01-12 ENCOUNTER — ANESTHESIA (OUTPATIENT)
Dept: ENDOSCOPY | Age: 47
End: 2022-01-12
Payer: COMMERCIAL

## 2022-01-12 VITALS
DIASTOLIC BLOOD PRESSURE: 89 MMHG | HEIGHT: 64 IN | TEMPERATURE: 97.4 F | BODY MASS INDEX: 20.49 KG/M2 | SYSTOLIC BLOOD PRESSURE: 159 MMHG | RESPIRATION RATE: 20 BRPM | OXYGEN SATURATION: 98 % | WEIGHT: 120 LBS | HEART RATE: 79 BPM

## 2022-01-12 VITALS — OXYGEN SATURATION: 93 % | SYSTOLIC BLOOD PRESSURE: 170 MMHG | DIASTOLIC BLOOD PRESSURE: 108 MMHG

## 2022-01-12 DIAGNOSIS — Z01.818 PREOP TESTING: Primary | ICD-10-CM

## 2022-01-12 LAB
HCG, URINE, POC: NEGATIVE
Lab: NORMAL
NEGATIVE QC PASS/FAIL: NORMAL
POSITIVE QC PASS/FAIL: NORMAL

## 2022-01-12 PROCEDURE — 2500000003 HC RX 250 WO HCPCS: Performed by: NURSE ANESTHETIST, CERTIFIED REGISTERED

## 2022-01-12 PROCEDURE — 45378 DIAGNOSTIC COLONOSCOPY: CPT | Performed by: SURGERY

## 2022-01-12 PROCEDURE — 3609027000 HC COLONOSCOPY: Performed by: SURGERY

## 2022-01-12 PROCEDURE — 3700000001 HC ADD 15 MINUTES (ANESTHESIA): Performed by: SURGERY

## 2022-01-12 PROCEDURE — 2580000003 HC RX 258: Performed by: NURSE ANESTHETIST, CERTIFIED REGISTERED

## 2022-01-12 PROCEDURE — 6360000002 HC RX W HCPCS: Performed by: NURSE ANESTHETIST, CERTIFIED REGISTERED

## 2022-01-12 PROCEDURE — 3700000000 HC ANESTHESIA ATTENDED CARE: Performed by: SURGERY

## 2022-01-12 PROCEDURE — 7100000011 HC PHASE II RECOVERY - ADDTL 15 MIN: Performed by: SURGERY

## 2022-01-12 PROCEDURE — 2709999900 HC NON-CHARGEABLE SUPPLY: Performed by: SURGERY

## 2022-01-12 PROCEDURE — 7100000010 HC PHASE II RECOVERY - FIRST 15 MIN: Performed by: SURGERY

## 2022-01-12 RX ORDER — GLYCOPYRROLATE 1 MG/5 ML
SYRINGE (ML) INTRAVENOUS PRN
Status: DISCONTINUED | OUTPATIENT
Start: 2022-01-12 | End: 2022-01-12 | Stop reason: SDUPTHER

## 2022-01-12 RX ORDER — PROPOFOL 10 MG/ML
INJECTION, EMULSION INTRAVENOUS PRN
Status: DISCONTINUED | OUTPATIENT
Start: 2022-01-12 | End: 2022-01-12 | Stop reason: SDUPTHER

## 2022-01-12 RX ORDER — SODIUM CHLORIDE 9 MG/ML
INJECTION, SOLUTION INTRAVENOUS CONTINUOUS
Status: DISCONTINUED | OUTPATIENT
Start: 2022-01-12 | End: 2022-01-12 | Stop reason: HOSPADM

## 2022-01-12 RX ORDER — SODIUM CHLORIDE 9 MG/ML
INJECTION, SOLUTION INTRAVENOUS CONTINUOUS PRN
Status: DISCONTINUED | OUTPATIENT
Start: 2022-01-12 | End: 2022-01-12 | Stop reason: SDUPTHER

## 2022-01-12 RX ADMIN — PROPOFOL 50 MG: 10 INJECTION, EMULSION INTRAVENOUS at 10:01

## 2022-01-12 RX ADMIN — SODIUM CHLORIDE: 9 INJECTION, SOLUTION INTRAVENOUS at 09:50

## 2022-01-12 RX ADMIN — Medication 0.1 MG: at 09:54

## 2022-01-12 RX ADMIN — PROPOFOL 50 MG: 10 INJECTION, EMULSION INTRAVENOUS at 09:58

## 2022-01-12 RX ADMIN — PROPOFOL 150 MG: 10 INJECTION, EMULSION INTRAVENOUS at 09:54

## 2022-01-12 RX ADMIN — PROPOFOL 50 MG: 10 INJECTION, EMULSION INTRAVENOUS at 10:04

## 2022-01-12 ASSESSMENT — PAIN SCALES - GENERAL
PAINLEVEL_OUTOF10: 0

## 2022-01-12 ASSESSMENT — PAIN - FUNCTIONAL ASSESSMENT: PAIN_FUNCTIONAL_ASSESSMENT: 0-10

## 2022-01-12 ASSESSMENT — PAIN DESCRIPTION - DESCRIPTORS: DESCRIPTORS: CRAMPING

## 2022-01-12 NOTE — H&P
HISTORY OF PRESENT ILLNESS:    The patient is a 55 y.o. female who presents with need for colonoscopy. She is never had previous colonoscopy. She denies family history of colon cancer. She denies diarrhea, constipation, or rectal bleeding. Past Medical History   No past medical history on file.        Past Surgical History         Past Surgical History:   Procedure Laterality Date    HIP SURGERY Left 07/09/2016     IM NAILING LEFT HIP    TUBAL LIGATION                Home Medications           Prior to Admission medications    Medication Sig Start Date End Date Taking? Authorizing Provider   diclofenac sodium (VOLTAREN) 1 % GEL Apply 2 g topically 4 times daily 11/10/21   Yes Germán Hoskins DO   baclofen (LIORESAL) 10 MG tablet Take 2 tablets by mouth 3 times daily 10/25/21   Yes Pedrito Cooley MD   traMADol (ULTRAM) 50 MG tablet Take 1 tablet by mouth every 8 hours as needed for Pain for up to 90 days.  10/25/21 1/23/22 Yes Rl Bhardwaj MD   oxybutynin (DITROPAN-XL) 5 MG extended release tablet Take 1 tablet by mouth daily 4/21/21   Yes Rl Bhardwaj MD            Scheduled Meds:  ContinuousInfusions:  PRN Meds:     No Known Allergies     Social History               Socioeconomic History    Marital status:        Spouse name: audrey    Number of children: 10    Years of education: Not on file    Highest education level: Not on file   Occupational History    Not on file   Tobacco Use    Smoking status: Current Every Day Smoker       Packs/day: 0.50       Types: Cigarettes    Smokeless tobacco: Never Used    Tobacco comment: quit smoking   Substance and Sexual Activity    Alcohol use: No    Drug use: No    Sexual activity: Yes       Partners: Male   Other Topics Concern    Not on file   Social History Narrative    Not on file      Social Determinants of Health          Financial Resource Strain: Low Risk     Difficulty of Paying Living Expenses: Not very hard   Food Insecurity: No Food Insecurity    Worried About Running Out of Food in the Last Year: Never true    Ran Out of Food in the Last Year: Never true   Transportation Needs:     Lack of Transportation (Medical): Not on file    Lack of Transportation (Non-Medical): Not on file   Physical Activity:     Days of Exercise per Week: Not on file    Minutes of Exercise per Session: Not on file   Stress:     Feeling of Stress : Not on file   Social Connections:     Frequency of Communication with Friends and Family: Not on file    Frequency of Social Gatherings with Friends and Family: Not on file    Attends Latter day Services: Not on file    Active Member of [address] [place] [address] [employer] or Organizations: Not on file    Attends Club or Organization Meetings: Not on file    Marital Status: Not on file   Intimate Partner Violence:     Fear of Current or Ex-Partner: Not on file    Emotionally Abused: Not on file    Physically Abused: Not on file    Sexually Abused: Not on file   Housing Stability:     Unable to Pay for Housing in the Last Year: Not on file    Number of [place] in the Last Year: Not on file    Unstable Housing in the Last Year: Not on file            Family History   No family history on file.        Review Of Systems:   Review of Systems   Constitutional: Negative for chills and fever. HENT: Negative for ear pain, nosebleeds, sore throat and tinnitus. Eyes: Negative for photophobia and redness. Respiratory: Negative for cough, shortness of breath and wheezing. Cardiovascular: Negative for chest pain and palpitations. Gastrointestinal: Negative for abdominal pain, blood in stool, constipation, diarrhea, nausea and vomiting. Endocrine: Negative for polydipsia. Genitourinary: Negative for dysuria, hematuria and urgency. Musculoskeletal: Negative for back pain and neck pain. Skin: Negative for rash. Neurological: Negative for dizziness, tremors and seizures.    Hematological: Does not bruise/bleed easily. All other systems reviewed and are negative.        Physical Exam:  Vitals       Vitals:     12/01/21 1330   BP: (!) 147/98   Pulse: 79   Temp: 97 °F (36.1 °C)   Weight: 123 lb (55.8 kg)   Height: 5' 4\" (1.626 m)            General: Well nourished, well developed, no acute distress  Eyes:   PERRL              Conjunctiva unremarkable   ENT:    TM's intact bilaterally, no effusion              Nasal:  No mucosal edema                           No nasal drainage              Oral:    mucosa moist and pink   Neck:   Supple              No palpable cervical lymphoadenopathy              Thyroid without mass or enlargement  Resp:   Lungs CTAB              Equal and adequate air exchange without accessory muscle use              No rales, rhonchi or wheeze  CV:      S1S2 RRR              No murmur              Intact distal pulses              No edema  GI:       Abdomen Soft, non tender, non distended              Normoactive bowel sounds              No palpable hepatosplenomegaly  MS:      Physiologic ROM of all extremities               Intact distal pulses              No clubbing or cyanosis   Skin     Warm and dry; no rash or lesion  Neuro: Alert and oriented; normal and intact dtr's   Psych: Euthymic mood, congruent affect       No results found.      Assessment/Plan:  3 44-year-old female presents for screening colonoscopy. We will plan for this in the near future.      The risks and benefits of the procedure were explained to the patient, including risks of bleeding and perforation. The patient expressed understanding of these risks.

## 2022-01-12 NOTE — OP NOTE
Preoperative diagnosis: Screening colonoscopy  Postoperative diagnosis: Same, normal exam  Procedure: Total colonoscopy  Surgeon: Noemí Fisher  Anesthesia: Mac  EBL: none  Findings: Normal exam. No polyps or mass lesions. Overall poor bowel prep with liquid and formed stool present in most areas of the colon. Thus, small polyp would not be identified. Recommend short interval colonoscopy approximately 1 year. Indications: This patient is a 51-year-old female   who presents for screening colonoscopy. Risks and benefits are explained to the patient who consents to the procedure. Description of procedure:  Procedure is performed in the endoscopy suite under conscious sedation provided by the anesthesia staff. Digital rectal exam is performed. There are no palpable rectal masses. Colonoscope was then passed transanally into the rectum and advanced slowly through the colon until the cecum was reached intubated. Overall bowel prep is poor with liquid and formed stool present most areas of the colon. . There are no large polyps or mass lesions identified. Once the cecum was intubated, scope was slowly withdrawn, inspecting the mucosal surfaces a second time during withdrawal. No lesions are noted during withdrawal. Scope was then removed and procedure completed. Next colonoscopy should be in 1 year due to poor bowel prep.

## 2022-01-12 NOTE — ANESTHESIA POSTPROCEDURE EVALUATION
Department of Anesthesiology  Postprocedure Note    Patient: Ben Abraham  MRN: [id number]  YOB: 1975  Date of evaluation: 1/12/2022  Time:  10:13 AM     Procedure Summary     Date: 01/12/22 Room / Location: [facility] / [facility]    Anesthesia Start: 3994 Anesthesia Stop: 1013    Procedure: COLORECTAL CANCER SCREENING, NOT HIGH RISK (N/A ) Diagnosis: (SCREENING)    Surgeons: Guanaco Sogn DO Responsible Provider: Abimbola Lau DO    Anesthesia Type: MAC ASA Status: 3          Anesthesia Type: MAC    Jorge Phase I: Jorge Score: 10    Jorge Phase II:      Last vitals: Reviewed and per EMR flowsheets.        Anesthesia Post Evaluation    Patient location during evaluation: bedside  Patient participation: complete - patient participated  Level of consciousness: awake and alert  Airway patency: patent  Nausea & Vomiting: no nausea and no vomiting  Complications: no  Cardiovascular status: blood pressure returned to baseline  Respiratory status: acceptable  Hydration status: euvolemic

## 2022-01-28 ENCOUNTER — PREP FOR PROCEDURE (OUTPATIENT)
Dept: ORTHOPEDIC SURGERY | Age: 47
End: 2022-01-28

## 2022-01-28 ENCOUNTER — TELEPHONE (OUTPATIENT)
Dept: ORTHOPEDIC SURGERY | Age: 47
End: 2022-01-28

## 2022-01-28 DIAGNOSIS — M16.32 OSTEOARTHRITIS RESULTING FROM HIP DYSPLASIA ON ONE SIDE, LEFT: Primary | ICD-10-CM

## 2022-01-28 DIAGNOSIS — S72.142S CLOSED INTERTROCHANTERIC FRACTURE OF HIP, LEFT, SEQUELA: ICD-10-CM

## 2022-01-28 RX ORDER — SODIUM CHLORIDE 9 MG/ML
25 INJECTION, SOLUTION INTRAVENOUS PRN
Status: CANCELLED | OUTPATIENT
Start: 2022-01-28

## 2022-01-28 RX ORDER — SODIUM CHLORIDE 0.9 % (FLUSH) 0.9 %
5-40 SYRINGE (ML) INJECTION EVERY 12 HOURS SCHEDULED
Status: CANCELLED | OUTPATIENT
Start: 2022-01-28

## 2022-01-28 RX ORDER — SODIUM CHLORIDE 0.9 % (FLUSH) 0.9 %
5-40 SYRINGE (ML) INJECTION PRN
Status: CANCELLED | OUTPATIENT
Start: 2022-01-28

## 2022-01-28 NOTE — TELEPHONE ENCOUNTER
Spoke with ZOHRA Suarez to check on prior Auth status for CT Scan. Spoke with Cassidy. No prior authorization on file. Started prior Auth case over the phone. Clinical questions answered. Approval status is pending at this time. Advised to uploaded clinicals via portal or fax clinicals in. Clinicals were uploaded to [employer] portal today.      [employer]  Member ID # [id number]  Tracking # [id number]  CPT [id number] CT Scan Left Hip wo Contrast  DX: M16.32, S72.142S  Date of Scan: 2-2-2022  Location: [place] [address]

## 2022-01-28 NOTE — TELEPHONE ENCOUNTER
Spoke with patient to find out when she is having her CT Scan done. Patient does not have it scheduled yet. She is having pre testing done on 2-2-2022 so she would like to have the CT Scan done the same day if possible. Requesting I schedule the CT Scan for her and call her back with the date/time.    Call # [phone]

## 2022-01-28 NOTE — TELEPHONE ENCOUNTER
Called patient. CT Scan date/time, address, phone number left on voicemail per her request. Call office back with questions.

## 2022-01-28 NOTE — H&P
Orthopaedic H&P Note     Lennie Cisse is a 55 y.o. female, her YOB: 1975 with the following history as recorded in [facility]:             Patient Active Problem List     Diagnosis Date Noted    Osteoarthritis resulting from hip dysplasia on one side, left 09/09/2021    Closed intertrochanteric fracture of hip, left, sequela 09/09/2021    Inspiratory wheeze on examination 08/08/2018      Current Facility-Administered Medications          Current Outpatient Medications   Medication Sig Dispense Refill    baclofen (LIORESAL) 10 MG tablet Take 2 tablets by mouth 3 times daily 180 tablet 2    traMADol (ULTRAM) 50 MG tablet Take 1 tablet by mouth every 8 hours as needed for Pain for up to 90 days. 90 tablet 2    oxybutynin (DITROPAN-XL) 5 MG extended release tablet Take 1 tablet by mouth daily 90 tablet 1      No current facility-administered medications for this visit.         Allergies: Patient has no known allergies. Past Medical History   No past medical history on file. Past Surgical History         Past Surgical History:   Procedure Laterality Date    HIP SURGERY Left 07/09/2016     IM NAILING LEFT HIP    TUBAL LIGATION             Family History   No family history on file. Social History            Tobacco Use    Smoking status: Current Every Day Smoker       Packs/day: 0.50       Types: Cigarettes    Smokeless tobacco: Never Used    Tobacco comment: quit smoking   Substance Use Topics    Alcohol use: No                                    Chief Complaint   Patient presents with    Hip Pain       8/10 pain. Pt expressed having consistent pain. Pt has pain in middle lower back. Pt has numbness and tingling in Left Hip. Negative crepitus.  Knee Pain       5/10 pain. Pt expressed pain is only present with ambulation.          SUBJECTIVE: Lennie Cisse is here today for their left Hip. The patient has had pain for sometime, but last 6 months or so it has become more severe. 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 She does have history of L hip intertrochanteric fracture treated with CMN in 2016 with Dr. Justus Romero. Aida Lopez has tried multiple conservative treatment. Has had therapy in the past, that worked at first, but the pain persisted. Patient has chronically been managed by physiatrist for pain management, bracing has been attempted to the knee. She has had appropriate modifications to the shoe for leg length discrepancy, ambulates with a SPC at all times. The pain is described as typical arthritic pain, achy in the joint, becoming more severe with stairs and any twisting motion of the left Hip. Rest makes the left Hip better along with NSAIDs and over the counter analgesics, but states they are now less effective. Patient can ambulate less than 1 block prior to pain limiting their function. Aida Lopez is having difficulty with ADLs, and states this pain is limiting here activity decreasing their quality of life. She would like to discuss ROBEL.      Review of Systems   Constitutional: Negative for fever, chills, diaphoresis, appetite change and fatigue. HENT: Negative for dental issues, hearing loss and tinnitus. Negative for congestion, sinus pressure, sneezing, sore throat. Negative for headache. Eyes: Negative for visual disturbance, blurred and double vision. Negative for pain, discharge, redness and itching  Respiratory: Negative for cough, shortness of breath and wheezing. Cardiovascular: Negative for chest pain, palpitations and leg swelling. No dyspnea on exertion   Gastrointestinal:   Negative for nausea, vomiting, abdominal pain, diarrhea, constipation  or black or bloody. Hematologic\Lymphatic:  negative for bleeding, petechiae,   Genitourinary: Negative for hematuria and difficulty urinating. Musculoskeletal: Negative for neck pain and stiffness.  Mild for back pain, negative joint swelling and gait problem. Skin: Negative for pallor, rash and wound. Neurological: Negative for dizziness, tremors, seizures, weakness, light-headedness, no TIA or stroke symptoms. No numbness and headaches.    Psychiatric/Behavioral: Negative.      Physical Examination:   General appearance: alert, well appearing, and in no distress,  normal appearing weight  Mental status: alert, oriented to person, place, and time, normal mood, behavior, speech, dress, motor activity, and thought processes  Abdomen: soft, nondistended   Resp:   resp easy and unlabored, no audible wheezes note  Cardiac: distal pulses palpable, skin well perfused  Neurological: alert, oriented X3, normal speech, no focal findings or movement disorder noted, motor and sensory grossly normal bilaterally, normal muscle tone, no tremors, strength 5/5, normal gait and station  HEENT: normochephalic atraumatic, external ears and eyes normal, sclera normal, neck supple  Extremities:   peripheral pulses normal, no edema, redness or tenderness in the calves   Skin: normal coloration, no rashes or open wounds, no suspicious skin lesions noted  Psych: Affect euthymic   Musculoskeletal:    Extremity:  left Lower Extremity  ·  Skin is intact circumferentially  · Previous surgical incisions well healed  · Genu valgum to the LLE  · approximately 2\" shortening on the left lower extremity  · +TTP to the left hemipelvis  · Pain with IR/ER of the left hip  · Compartments soft and compressible  · Palpable dorsalis pedis and posterior tibialis pulse, brisk cap refill to toes, foot warm and perfused  · Sensation intact to light touch in sural/deep peroneal/superficial peroneal/saphenous/posterior tibial nerve distributions to foot/ankle  · Demonstrates active ankle plantar/dorsiflexion/great toe extension        XR: 9/9/21- AP pelvis and 2 views of the left femur demonstrates intact CMN at the left femur.  Dysplastic appearing left hip with shallow left acetabulum and flattened left femoral head.  Degenerative arthropathy present as well.         ASSESSMENT:       Diagnosis Orders   1. Osteoarthritis resulting from hip dysplasia on one side, left  CT HIP LEFT WO CONTRAST     [facility]   2. Closed intertrochanteric fracture of hip, left, sequela            Discussion:  The patient would like to proceed with total left Hip arthroplasty when cleared by their PCP. Discussed use of the CIARA for her procedure given her underlying dysplastic acetabulum. Georgia Baez understands the risks include but are not limited to infection, injuries to the blood vessel and nerves, bleeding, continued pain and non relief of pain, aseptic loosening dislocation, limb length discrepancy, arthrofibrosis of the joint, further operation, deep venous thrombosis, pulmonary embolism and fracture, heart attack and death. Татьяна operative plan discussed, need for anticoagulation for DVT/PE prophylaxis, need for physical therapy, office follow up visits, and possible rehab stay. It was also explained patient will need to take a prophylactic dose of ATB prior to any bowel, dental or mouth procedures, including dental cleaning, for length of the implant. Did review surgery prosthesis with model with patient as well. Pain control was also discussed and the expectation is to have patient off narcotic pain meds around 3 weeks post operatively for a total joint arthroplasty     Elective Orthopedic Surgery Checklist:  []  [x]? Nicotine cessation education- If nonunion surgery, needs negative Nicotine blood work  []? Established with a PCP-- patient to re-establish with [facility]  []  []  [x]? No pending dental treatments prior to total joint  [x]? Joint Education Class Needed   []? Any other areas of concern that need addressed prior to surgery: NA           PLAN:  You will need to be medically cleared before surgery by your family doctor.  Please call your doctor to set up an appointment for medical clearance as soon as possible and have the office fax your medical clearance to :   Brigitte Guadalupe [phone]   ATTN:  Melinda Wilson Pam     Call internal medicine center [phone] for appointment     [facility]- [facility]     1. Your surgery is scheduled for Left Hip CIARA Total Arthroplasty when medically cleared with Dr. Yani Garcia, DO at the main [address] [place] [address] will need to report to Preop area  that morning at 90 minutes before surgery. 2. Plan to stay overnight after surgery  3. Preadmission Testing (PAT) department at [facility] will contact you with all the details prior to surgery. 4. Nothing to eat or drink after midnight the night before surgery. You may take a pain pill and any other medicine PAT instructs you to take with small sip of water if needed. 5. You will need to attend Joint Education Class prior to surgery- Tuesday AM from 10-11 am  6. Continue with ice and elevation to reduce swelling  7. Weightbearing as tolerated left lower, use assistive devices as needed  8. Take pain medicine as instructed  9. Call office with any question or concerns: [phone]. Hold Lovenox/Aspirin the day of surgery. Hold all NSAIDs 7 days prior to surgery  11. Pre Op COVID 19 testing  Planning for post op DVT prophylaxis with Aspirin as well as nonpharmacologic use of Home SCDs  Patient to also to be supplied with cryotherapy with compression post operatively.      Electronically signed by Luz Marina Austin PA-C on 9/10/2021 at 9:41 AM  Note: This report was completed using [employer] voiced recognition software.  Every effort has been made to ensure accuracy; however, inadvertent computerized transcription errors may be present.                Office Visit on 9/9/2021           Office Visit on 9/9/2021                Detailed Report            Note shared with patient        Progress Notes Info    Author Note Status Last Update User Last Update Date/Time   Ilan Hernandez PA-C Signed Elaine De Leon PA-C 9/10/2021  9:48 AM     Chart Review Routing History    No routing history on file.

## 2022-01-28 NOTE — H&P (VIEW-ONLY)
Orthopaedic H&P Note     Mike Hull is a 55 y.o. female, her YOB: 1975 with the following history as recorded in [facility]:             Patient Active Problem List     Diagnosis Date Noted    Osteoarthritis resulting from hip dysplasia on one side, left 09/09/2021    Closed intertrochanteric fracture of hip, left, sequela 09/09/2021    Inspiratory wheeze on examination 08/08/2018      Current Facility-Administered Medications          Current Outpatient Medications   Medication Sig Dispense Refill    baclofen (LIORESAL) 10 MG tablet Take 2 tablets by mouth 3 times daily 180 tablet 2    traMADol (ULTRAM) 50 MG tablet Take 1 tablet by mouth every 8 hours as needed for Pain for up to 90 days. 90 tablet 2    oxybutynin (DITROPAN-XL) 5 MG extended release tablet Take 1 tablet by mouth daily 90 tablet 1      No current facility-administered medications for this visit.         Allergies: Patient has no known allergies. Past Medical History   No past medical history on file. Past Surgical History         Past Surgical History:   Procedure Laterality Date    HIP SURGERY Left 07/09/2016     IM NAILING LEFT HIP    TUBAL LIGATION             Family History   No family history on file. Social History            Tobacco Use    Smoking status: Current Every Day Smoker       Packs/day: 0.50       Types: Cigarettes    Smokeless tobacco: Never Used    Tobacco comment: quit smoking   Substance Use Topics    Alcohol use: No                                    Chief Complaint   Patient presents with    Hip Pain       8/10 pain. Pt expressed having consistent pain. Pt has pain in middle lower back. Pt has numbness and tingling in Left Hip. Negative crepitus.  Knee Pain       5/10 pain. Pt expressed pain is only present with ambulation.          SUBJECTIVE: Mike Hull is here today for their left Hip. The patient has had pain for sometime, but last 6 months or so it has become more severe. 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 She does have history of L hip intertrochanteric fracture treated with CMN in 2016 with Dr. Noa Lee. Magnolia Willis has tried multiple conservative treatment. Has had therapy in the past, that worked at first, but the pain persisted. Patient has chronically been managed by physiatrist for pain management, bracing has been attempted to the knee. She has had appropriate modifications to the shoe for leg length discrepancy, ambulates with a SPC at all times. The pain is described as typical arthritic pain, achy in the joint, becoming more severe with stairs and any twisting motion of the left Hip. Rest makes the left Hip better along with NSAIDs and over the counter analgesics, but states they are now less effective. Patient can ambulate less than 1 block prior to pain limiting their function. Magnolia Willis is having difficulty with ADLs, and states this pain is limiting here activity decreasing their quality of life. She would like to discuss ROBEL.      Review of Systems   Constitutional: Negative for fever, chills, diaphoresis, appetite change and fatigue. HENT: Negative for dental issues, hearing loss and tinnitus. Negative for congestion, sinus pressure, sneezing, sore throat. Negative for headache. Eyes: Negative for visual disturbance, blurred and double vision. Negative for pain, discharge, redness and itching  Respiratory: Negative for cough, shortness of breath and wheezing. Cardiovascular: Negative for chest pain, palpitations and leg swelling. No dyspnea on exertion   Gastrointestinal:   Negative for nausea, vomiting, abdominal pain, diarrhea, constipation  or black or bloody. Hematologic\Lymphatic:  negative for bleeding, petechiae,   Genitourinary: Negative for hematuria and difficulty urinating. Musculoskeletal: Negative for neck pain and stiffness.  Mild for back pain, negative joint swelling and gait problem. Skin: Negative for pallor, rash and wound. Neurological: Negative for dizziness, tremors, seizures, weakness, light-headedness, no TIA or stroke symptoms. No numbness and headaches.    Psychiatric/Behavioral: Negative.      Physical Examination:   General appearance: alert, well appearing, and in no distress,  normal appearing weight  Mental status: alert, oriented to person, place, and time, normal mood, behavior, speech, dress, motor activity, and thought processes  Abdomen: soft, nondistended   Resp:   resp easy and unlabored, no audible wheezes note  Cardiac: distal pulses palpable, skin well perfused  Neurological: alert, oriented X3, normal speech, no focal findings or movement disorder noted, motor and sensory grossly normal bilaterally, normal muscle tone, no tremors, strength 5/5, normal gait and station  HEENT: normochephalic atraumatic, external ears and eyes normal, sclera normal, neck supple  Extremities:   peripheral pulses normal, no edema, redness or tenderness in the calves   Skin: normal coloration, no rashes or open wounds, no suspicious skin lesions noted  Psych: Affect euthymic   Musculoskeletal:    Extremity:  left Lower Extremity  ·  Skin is intact circumferentially  · Previous surgical incisions well healed  · Genu valgum to the LLE  · approximately 2\" shortening on the left lower extremity  · +TTP to the left hemipelvis  · Pain with IR/ER of the left hip  · Compartments soft and compressible  · Palpable dorsalis pedis and posterior tibialis pulse, brisk cap refill to toes, foot warm and perfused  · Sensation intact to light touch in sural/deep peroneal/superficial peroneal/saphenous/posterior tibial nerve distributions to foot/ankle  · Demonstrates active ankle plantar/dorsiflexion/great toe extension        XR: 9/9/21- AP pelvis and 2 views of the left femur demonstrates intact CMN at the left femur.  Dysplastic appearing left hip with shallow left acetabulum and flattened left femoral head.  Degenerative arthropathy present as well.         ASSESSMENT:       Diagnosis Orders   1. Osteoarthritis resulting from hip dysplasia on one side, left  CT HIP LEFT WO CONTRAST     [facility]   2. Closed intertrochanteric fracture of hip, left, sequela            Discussion:  The patient would like to proceed with total left Hip arthroplasty when cleared by their PCP. Discussed use of the CIARA for her procedure given her underlying dysplastic acetabulum. Eduar Ward understands the risks include but are not limited to infection, injuries to the blood vessel and nerves, bleeding, continued pain and non relief of pain, aseptic loosening dislocation, limb length discrepancy, arthrofibrosis of the joint, further operation, deep venous thrombosis, pulmonary embolism and fracture, heart attack and death. Татьяна operative plan discussed, need for anticoagulation for DVT/PE prophylaxis, need for physical therapy, office follow up visits, and possible rehab stay. It was also explained patient will need to take a prophylactic dose of ATB prior to any bowel, dental or mouth procedures, including dental cleaning, for length of the implant. Did review surgery prosthesis with model with patient as well. Pain control was also discussed and the expectation is to have patient off narcotic pain meds around 3 weeks post operatively for a total joint arthroplasty     Elective Orthopedic Surgery Checklist:  []  [x]? Nicotine cessation education- If nonunion surgery, needs negative Nicotine blood work  []? Established with a PCP-- patient to re-establish with [facility]  []  []  [x]? No pending dental treatments prior to total joint  [x]? Joint Education Class Needed   []? Any other areas of concern that need addressed prior to surgery: NA           PLAN:  You will need to be medically cleared before surgery by your family doctor.  Please call your doctor to set up an appointment for medical clearance as soon as possible and have the office fax your medical clearance to :   Jacob Marjorie [phone]   ATTN:  1 Steve Pam     Call internal medicine center [phone] for appointment     Pre op [facility]- [facility]     1. Your surgery is scheduled for Left Hip CIARA Total Arthroplasty when medically cleared with Dr. Soham Jones, DO at the main [address] [place] [address] will need to report to Preop area  that morning at 90 minutes before surgery. 2. Plan to stay overnight after surgery  3. Preadmission Testing (PAT) department at [facility] will contact you with all the details prior to surgery. 4. Nothing to eat or drink after midnight the night before surgery. You may take a pain pill and any other medicine PAT instructs you to take with small sip of water if needed. 5. You will need to attend Joint Education Class prior to surgery- Tuesday AM from 10-11 am  6. Continue with ice and elevation to reduce swelling  7. Weightbearing as tolerated left lower, use assistive devices as needed  8. Take pain medicine as instructed  9. Call office with any question or concerns: [phone]. Hold Lovenox/Aspirin the day of surgery. Hold all NSAIDs 7 days prior to surgery  11. Pre Op COVID 19 testing  Planning for post op DVT prophylaxis with Aspirin as well as nonpharmacologic use of Home SCDs  Patient to also to be supplied with cryotherapy with compression post operatively.      Electronically signed by Mary Deluca PA-C on 9/10/2021 at 9:41 AM  Note: This report was completed using [employer] voiced recognition software.  Every effort has been made to ensure accuracy; however, inadvertent computerized transcription errors may be present.                Office Visit on 9/9/2021           Office Visit on 9/9/2021                Detailed Report            Note shared with patient        Progress Notes Info    Author Note Status Last Update User Last Update Date/Time   Sara Amaral PA-C Signed Tracey Cary De Leon PA-C 9/10/2021  9:48 AM     Chart Review Routing History    No routing history on file.

## 2022-02-02 ENCOUNTER — HOSPITAL ENCOUNTER (OUTPATIENT)
Dept: CT IMAGING | Age: 47
Discharge: HOME | End: 2022-02-04
Payer: COMMERCIAL

## 2022-02-02 ENCOUNTER — HOSPITAL ENCOUNTER (OUTPATIENT)
Dept: PREADMISSION TESTING | Age: 47
Discharge: HOME | End: 2022-02-02
Payer: COMMERCIAL

## 2022-02-02 VITALS
DIASTOLIC BLOOD PRESSURE: 81 MMHG | HEART RATE: 72 BPM | BODY MASS INDEX: 20.49 KG/M2 | OXYGEN SATURATION: 98 % | RESPIRATION RATE: 18 BRPM | SYSTOLIC BLOOD PRESSURE: 129 MMHG | WEIGHT: 120 LBS | HEIGHT: 64 IN | TEMPERATURE: 97.3 F

## 2022-02-02 DIAGNOSIS — S72.142S CLOSED INTERTROCHANTERIC FRACTURE OF HIP, LEFT, SEQUELA: ICD-10-CM

## 2022-02-02 DIAGNOSIS — M16.32 OSTEOARTHRITIS RESULTING FROM HIP DYSPLASIA ON ONE SIDE, LEFT: ICD-10-CM

## 2022-02-02 LAB
ABO/RH: NORMAL
ALBUMIN SERPL-MCNC: 4.2 G/DL (ref 3.5–5.2)
ALP BLD-CCNC: 61 U/L (ref 35–104)
ALT SERPL-CCNC: 7 U/L (ref 0–32)
ANION GAP SERPL CALCULATED.3IONS-SCNC: 8 MMOL/L (ref 7–16)
ANISOCYTOSIS: ABNORMAL
ANTIBODY SCREEN: NORMAL
AST SERPL-CCNC: 14 U/L (ref 0–31)
ATYPICAL LYMPHOCYTE RELATIVE PERCENT: 1.7 % (ref 0–4)
BASOPHILS ABSOLUTE: 0 E9/L (ref 0–0.2)
BASOPHILS RELATIVE PERCENT: 0 % (ref 0–2)
BILIRUB SERPL-MCNC: <0.2 MG/DL (ref 0–1.2)
BUN BLDV-MCNC: 7 MG/DL (ref 6–20)
BURR CELLS: ABNORMAL
CALCIUM SERPL-MCNC: 9.1 MG/DL (ref 8.6–10.2)
CHLORIDE BLD-SCNC: 103 MMOL/L (ref 98–107)
CO2: 24 MMOL/L (ref 22–29)
CREAT SERPL-MCNC: 0.8 MG/DL (ref 0.5–1)
EOSINOPHILS ABSOLUTE: 0.12 E9/L (ref 0.05–0.5)
EOSINOPHILS RELATIVE PERCENT: 2.6 % (ref 0–6)
GFR AFRICAN AMERICAN: >60
GFR NON-AFRICAN AMERICAN: >60 ML/MIN/1.73
GLUCOSE BLD-MCNC: 89 MG/DL (ref 74–99)
HCT VFR BLD CALC: 27.3 % (ref 34–48)
HEMOGLOBIN: 8 G/DL (ref 11.5–15.5)
HYPOCHROMIA: ABNORMAL
INR BLD: 1.1
LYMPHOCYTES ABSOLUTE: 1.68 E9/L (ref 1.5–4)
LYMPHOCYTES RELATIVE PERCENT: 33.1 % (ref 20–42)
MCH RBC QN AUTO: 21.9 PG (ref 26–35)
MCHC RBC AUTO-ENTMCNC: 29.3 % (ref 32–34.5)
MCV RBC AUTO: 74.8 FL (ref 80–99.9)
MONOCYTES ABSOLUTE: 0.43 E9/L (ref 0.1–0.95)
MONOCYTES RELATIVE PERCENT: 8.7 % (ref 2–12)
NEUTROPHILS ABSOLUTE: 2.59 E9/L (ref 1.8–7.3)
NEUTROPHILS RELATIVE PERCENT: 53.9 % (ref 43–80)
NUCLEATED RED BLOOD CELLS: 0 /100 WBC
OVALOCYTES: ABNORMAL
PDW BLD-RTO: 18.1 FL (ref 11.5–15)
PLATELET # BLD: 454 E9/L (ref 130–450)
PMV BLD AUTO: 8.9 FL (ref 7–12)
POIKILOCYTES: ABNORMAL
POTASSIUM SERPL-SCNC: 4.4 MMOL/L (ref 3.5–5)
PROTHROMBIN TIME: 11.4 SEC (ref 9.3–12.4)
RBC # BLD: 3.65 E12/L (ref 3.5–5.5)
SODIUM BLD-SCNC: 135 MMOL/L (ref 132–146)
TARGET CELLS: ABNORMAL
TEAR DROP CELLS: ABNORMAL
TOTAL PROTEIN: 7.1 G/DL (ref 6.4–8.3)
WBC # BLD: 4.8 E9/L (ref 4.5–11.5)

## 2022-02-02 PROCEDURE — 93005 ELECTROCARDIOGRAM TRACING: CPT

## 2022-02-02 PROCEDURE — 86901 BLOOD TYPING SEROLOGIC RH(D): CPT

## 2022-02-02 PROCEDURE — 86850 RBC ANTIBODY SCREEN: CPT

## 2022-02-02 PROCEDURE — 87081 CULTURE SCREEN ONLY: CPT

## 2022-02-02 PROCEDURE — 73700 CT LOWER EXTREMITY W/O DYE: CPT

## 2022-02-02 PROCEDURE — 86900 BLOOD TYPING SEROLOGIC ABO: CPT

## 2022-02-02 PROCEDURE — 85610 PROTHROMBIN TIME: CPT

## 2022-02-02 PROCEDURE — 36415 COLL VENOUS BLD VENIPUNCTURE: CPT

## 2022-02-02 PROCEDURE — 80053 COMPREHEN METABOLIC PANEL: CPT

## 2022-02-02 PROCEDURE — 85025 COMPLETE CBC W/AUTO DIFF WBC: CPT

## 2022-02-02 ASSESSMENT — HOOS JR
HOOS JR RAW SCORE: 20
LYING IN BED (TURNING OVER, MAINTAINING HIP POSITION): 2
RISING FROM SITTING: 4
GOING UP OR DOWN STAIRS: 4
WALKING ON UNEVEN SURFACE: 4
BENDING TO THE FLOOR TO PICK UP OBJECT: 3
SITTING: 3

## 2022-02-02 NOTE — PROGRESS NOTES
[facility] PRE-ADMISSION TESTING INSTRUCTIONS  Surgery Date 2-8-22  Arrival Time 8:30 a.m. The Preadmission Testing patient is instructed accordingly using the following criteria (check applicable):    ARRIVAL INSTRUCTIONS:  [x] Parking the day of Surgery is located in the Main Entrance lot. Upon entering the door, make an immediate right to the surgery reception desk    [] Bring photo ID and insurance card    [] Bring in a copy of Living will or Durable Power of  papers. [] Please be sure to arrange for responsible adult to provide transportation to and from the hospital    [] Please arrange for responsible adult to be with you for the 24 hour period post procedure due to having anesthesia      GENERAL INSTRUCTIONS:    [x] Nothing by mouth after midnight, including gum, candy, mints or water    [x] You may brush your teeth, but do not swallow any water    [x] Take medications as instructed with 1-2 oz of water    [x] Stop herbal supplements and vitamins 5 days prior to procedure    [x] Follow preop dosing of blood thinners per physician instructions    [] Take 1/2 dose of evening insulin, but no insulin after midnight    [] No oral diabetic medications after midnight    [] If diabetic and have low blood sugar or feel symptomatic, take 1-2oz apple juice only    [] Bring inhalers day of surgery    [] Bring C-PAP/ Bi-Pap day of surgery    [x] Bring urine specimen day of surgery    [x] Shower or bath with soap, lather and rinse well, AM of Surgery, no lotion, powders or creams to surgical site    [] Follow bowel prep as instructed per surgeon    [x] No tobacco products within 24 hours of surgery     [x] No alcohol or illegal drug use within 24 hours of surgery.     [x] Jewelry, body piercing's, eyeglasses, contact lenses and dentures are not permitted into surgery (bring cases)      [x] Please do not wear any nail polish, make up or hair products on the day of surgery    [x] You mask as well. Both of you should check your temperature before leaving to come here,  if it is 100 or higher please call [phone] for instruction.

## 2022-02-02 NOTE — TELEPHONE ENCOUNTER
Called patient on her cell twice and once on her home phone. Left voicemail on each number. Insurance has not reviewed her clinicals or assigned a  to the case. Therefore her CT Scan is not approved yet so she can not have the CT Scan done today. Since the CT Scan is part of the [facility] Protocol, surgery will have to be cancelled for 2-8-2022. Both voicemail's requested a call back from the patient.

## 2022-02-02 NOTE — TELEPHONE ENCOUNTER
CT Scan of the Left Hip was done today. Patient was notified by voicemail her CT Scan approval status was still pending.

## 2022-02-02 NOTE — PROGRESS NOTES
I met with Alpa Desai this am for an orthopaedic education class. We discussed information regarding pre, intra, post-op, discharge, and LOS expectations. I provided ortho education materials. I encouraged the patient to call with any questions or concerns.

## 2022-02-02 NOTE — TELEPHONE ENCOUNTER
Spoke with [employer]. Portal is not showing update on status. I was told today [employer] is waiting on physician reviewer to  the case to review it. They have up to 15 business days to review the information and make a decision. Call reference # [id number].

## 2022-02-02 NOTE — TELEPHONE ENCOUNTER
Contacted [place] [id number]. Spoke with Dequan Jenkins. Notified Dequan Jenkins the authorization for CT Scan is still pending with insurance at this time. I have tried to get ahold of the patient 4 separate times and left 3 voicemail's. Dequan Jenkins said she will speak with the patient if she calls in or if she stops in.

## 2022-02-03 ENCOUNTER — HOSPITAL ENCOUNTER (OUTPATIENT)
Age: 47
Discharge: HOME | End: 2022-02-05
Payer: COMMERCIAL

## 2022-02-03 DIAGNOSIS — Z01.818 PREOP TESTING: ICD-10-CM

## 2022-02-03 LAB
EKG ATRIAL RATE: 71 BPM
EKG P AXIS: 59 DEGREES
EKG P-R INTERVAL: 176 MS
EKG Q-T INTERVAL: 418 MS
EKG QRS DURATION: 84 MS
EKG QTC CALCULATION (BAZETT): 454 MS
EKG R AXIS: 20 DEGREES
EKG T AXIS: 28 DEGREES
EKG VENTRICULAR RATE: 71 BPM
SARS-COV-2, PCR: NOT DETECTED

## 2022-02-03 PROCEDURE — U0003 INFECTIOUS AGENT DETECTION BY NUCLEIC ACID (DNA OR RNA); SEVERE ACUTE RESPIRATORY SYNDROME CORONAVIRUS 2 (SARS-COV-2) (CORONAVIRUS DISEASE [COVID-19]), AMPLIFIED PROBE TECHNIQUE, MAKING USE OF HIGH THROUGHPUT TECHNOLOGIES AS DESCRIBED BY CMS-2020-01-R: HCPCS

## 2022-02-03 PROCEDURE — U0005 INFEC AGEN DETEC AMPLI PROBE: HCPCS

## 2022-02-04 ENCOUNTER — TELEPHONE (OUTPATIENT)
Dept: PRIMARY CARE CLINIC | Age: 47
End: 2022-02-04

## 2022-02-04 ENCOUNTER — TELEPHONE (OUTPATIENT)
Dept: ORTHOPEDIC SURGERY | Age: 47
End: 2022-02-04

## 2022-02-04 LAB — MRSA CULTURE ONLY: NORMAL

## 2022-02-04 NOTE — TELEPHONE ENCOUNTER
Patient is scheduled to have Conversion of Previous Left Hip surgery to Left Total Hip Arthroplasty using CIARA Robotic Assist @ [facility] on 2-8-2022 by Dr. Jeancarlos Ramos. This is a Inpatient surgery. CPT: 22527    I spoke with Ailin on 1- to get the case started. Pending Auth # [id number]. Date Range: 2-8-2022 through 2-9-2022. Instructed to either upload clinicals via [employer] Portal or wait for a NCM to be assigned the case, then NCM would call me with a fax number to send clinicals into. Case was started via [employer] Portal on 1- (to correct details of surgery). Clinicals were uploaded to [address] [place] [address] on 1-. Medication List, X-ray reports, Office Visit notes were uploaded on 1-. CT Scan was not uploaded at this time because CT Scan was not performed yet. I spoke with patient on 1- to find out when her CT Scan wo Contrast was scheduled for. She did not have it scheduled yet. During our call she agreed to have it done on 2-2-2022 @ 1:45 pm after her pre testing was finished the same day at [facility]. I did receive a fax dated 1- with name/number to send clinicals to but the clinicals were already uploaded to the [address] [place] [address] already. NCM assigned was: Conor Guillaume, fax # [phone]. I spoke with Akbar Cardenas at [facility] on 2-4-2022 to check on prior auth status of surgery. Portal is showing the status as pending. Per Akbar Cardenas, the case is scheduled to close by end of day today, 2-4-2022. Caro did confirm clinicals were received but CT Scan was not included. I explained CT Scan was recently done on 2-2-2022 and I uploaded it via Portal today, 2-4-2022. Caro confirmed CT Scan is showing on Portal now.

## 2022-02-04 NOTE — TELEPHONE ENCOUNTER
Dr Jules Trivedi office called asking if patient is clear for surgery on 2.8.2022, reccent blood work and ekg done this month    [phone]

## 2022-02-04 NOTE — TELEPHONE ENCOUNTER
If PCP states she needs appt prior to medical clearance we do need this prior to elective surgery.    Prior medical clearance from visit 11-10-21 if this is not OK to be updated with PCP then we have to proceed based on their recommendations in regards to medical clearance  Electronically signed by Veronica Berry PA-C on 2/4/2022 at 2:04 PM

## 2022-02-04 NOTE — TELEPHONE ENCOUNTER
Received call from Maicol Avila at [facility] re: prior Auth of Left Hip CT Scan. Per Maicol Avila, the CT Scan is currently pended but will be denied due to lack of clinicals received. Informed Mita Metz were uploaded to online portal on 1-. Maicol Avila checked the portal. She did confirm clinicals were uploaded on 1-. Maicol Avila is going to send message to clinical reviewers stating clinicals were indeed uploaded to portal and the clinicals need reviewed before closing this case. Maicol Avila stated I would receive a call back.

## 2022-02-04 NOTE — TELEPHONE ENCOUNTER
This patient needs an appointment for pre op clearance. Job Nichols called 1/28/2022. No answer.  LVM to return call to schedule an appointment

## 2022-02-04 NOTE — TELEPHONE ENCOUNTER
Per Dr. Ira Sidhu phone encounter note dated today, they are not going to update her medical clearance. Note from today is stating she needs medical clearance appointment. Their office tried calling her previously but patient never returned their call. Patient was also non compliant scheduling her CT Scan until I spoke with her on 1-. CT Scan per [facility] protocol was done on 2-2-2022.

## 2022-02-04 NOTE — TELEPHONE ENCOUNTER
Patient has upcoming surgery with Dr. Tomás Vaughn on 2-8-2022. She was instructed to obtain medical clearance from her PCP within 30 days of her surgery date when she was last seen in our office on 9-9-2021. There was not a surgery date scheduled yet but patient was informed of needing medical clearance. Surgery date was decided on 12- for surgery on 2-8-2022. She was seen by her PCP Dr. Ziggy Ying on 11-. Medical clearance was given during this visit. She was seen by Dr. Stevo Padilla on 1-. Surgery was mentioned during her ov but nothing was noted if she was low risk from Dr. Nakita Ovalle viewpoint. Medical clearance form was faxed to her PCP on 1- asking if patient was medically cleared or low risk for surgery on 2-8-2022. I spoke with Alberta Zimmer @ PCP office today. Requested medical clearance form to be faxed back over to their office today since Dr. Lori Batista is in the office today. I did refax the form again to PCP office. I also spoke with Heavenly Graf @ Dr. Nakita Ovalle office today. Dr. Nakita Ovalle is scheduled to be in the office today at 5: 27 am. My direct fax number was given. Explained I need documentation from one of these doctors stating the patient is \"medically cleared, low risk, medically optimized for surgery\" from one of their medical standpoint.

## 2022-02-04 NOTE — TELEPHONE ENCOUNTER
Reviewed documentation, patient had clearance from PCP on 11/2021, reviewed labs from PAT visit as well as from November 2021 from PCP.  Patients chronic anemia appears to be at baseline, EKG shows no changes, we can accept clearance from PCP 11/2021  Electronically signed by Megan Rodrigues PA-C on 2/4/2022 at 2:27 PM

## 2022-02-07 ENCOUNTER — ANESTHESIA EVENT (OUTPATIENT)
Dept: OPERATING ROOM | Age: 47
DRG: 324 | End: 2022-02-07
Payer: COMMERCIAL

## 2022-02-08 ENCOUNTER — HOSPITAL ENCOUNTER (INPATIENT)
Age: 47
LOS: 1 days | Discharge: HOME HEALTH | DRG: 324 | End: 2022-02-09
Attending: ORTHOPAEDIC SURGERY | Admitting: ORTHOPAEDIC SURGERY
Payer: COMMERCIAL

## 2022-02-08 ENCOUNTER — APPOINTMENT (OUTPATIENT)
Dept: GENERAL RADIOLOGY | Age: 47
DRG: 324 | End: 2022-02-08
Attending: ORTHOPAEDIC SURGERY
Payer: COMMERCIAL

## 2022-02-08 ENCOUNTER — ANESTHESIA (OUTPATIENT)
Dept: OPERATING ROOM | Age: 47
DRG: 324 | End: 2022-02-08
Payer: COMMERCIAL

## 2022-02-08 VITALS
OXYGEN SATURATION: 100 % | DIASTOLIC BLOOD PRESSURE: 95 MMHG | RESPIRATION RATE: 10 BRPM | TEMPERATURE: 95.7 F | SYSTOLIC BLOOD PRESSURE: 138 MMHG

## 2022-02-08 DIAGNOSIS — Z01.818 PREOP TESTING: Primary | ICD-10-CM

## 2022-02-08 DIAGNOSIS — Z96.642 S/P TOTAL LEFT HIP ARTHROPLASTY: ICD-10-CM

## 2022-02-08 PROBLEM — D50.9 IRON DEFICIENCY ANEMIA: Status: ACTIVE | Noted: 2022-02-08

## 2022-02-08 PROBLEM — R03.0 ELEVATED BP WITHOUT DIAGNOSIS OF HYPERTENSION: Status: ACTIVE | Noted: 2022-02-08

## 2022-02-08 LAB
BLOOD BANK DISPENSE STATUS: NORMAL
BLOOD BANK DISPENSE STATUS: NORMAL
BLOOD BANK PRODUCT CODE: NORMAL
BLOOD BANK PRODUCT CODE: NORMAL
BPU ID: NORMAL
BPU ID: NORMAL
DESCRIPTION BLOOD BANK: NORMAL
DESCRIPTION BLOOD BANK: NORMAL
HCG, URINE, POC: NEGATIVE
Lab: NORMAL
NEGATIVE QC PASS/FAIL: NORMAL
POSITIVE QC PASS/FAIL: NORMAL

## 2022-02-08 PROCEDURE — 6360000002 HC RX W HCPCS: Performed by: ANESTHESIOLOGY

## 2022-02-08 PROCEDURE — 20985 CPTR-ASST DIR MS PX: CPT | Performed by: ORTHOPAEDIC SURGERY

## 2022-02-08 PROCEDURE — 6360000002 HC RX W HCPCS: Performed by: PHYSICIAN ASSISTANT

## 2022-02-08 PROCEDURE — 6370000000 HC RX 637 (ALT 250 FOR IP)

## 2022-02-08 PROCEDURE — 2580000003 HC RX 258: Performed by: ORTHOPAEDIC SURGERY

## 2022-02-08 PROCEDURE — 3209999900 FLUORO FOR SURGICAL PROCEDURES

## 2022-02-08 PROCEDURE — 8E0Y4CZ ROBOTIC ASSISTED PROCEDURE OF LOWER EXTREMITY, PERCUTANEOUS ENDOSCOPIC APPROACH: ICD-10-PCS | Performed by: ORTHOPAEDIC SURGERY

## 2022-02-08 PROCEDURE — 2580000003 HC RX 258: Performed by: NURSE ANESTHETIST, CERTIFIED REGISTERED

## 2022-02-08 PROCEDURE — 7100000001 HC PACU RECOVERY - ADDTL 15 MIN: Performed by: ORTHOPAEDIC SURGERY

## 2022-02-08 PROCEDURE — 2500000003 HC RX 250 WO HCPCS: Performed by: PHYSICIAN ASSISTANT

## 2022-02-08 PROCEDURE — 6360000002 HC RX W HCPCS: Performed by: STUDENT IN AN ORGANIZED HEALTH CARE EDUCATION/TRAINING PROGRAM

## 2022-02-08 PROCEDURE — 97162 PT EVAL MOD COMPLEX 30 MIN: CPT

## 2022-02-08 PROCEDURE — 3600000004 HC SURGERY LEVEL 4 BASE: Performed by: ORTHOPAEDIC SURGERY

## 2022-02-08 PROCEDURE — 3600000014 HC SURGERY LEVEL 4 ADDTL 15MIN: Performed by: ORTHOPAEDIC SURGERY

## 2022-02-08 PROCEDURE — 0SRB0JZ REPLACEMENT OF LEFT HIP JOINT WITH SYNTHETIC SUBSTITUTE, OPEN APPROACH: ICD-10-PCS | Performed by: ORTHOPAEDIC SURGERY

## 2022-02-08 PROCEDURE — 2709999900 HC NON-CHARGEABLE SUPPLY: Performed by: ORTHOPAEDIC SURGERY

## 2022-02-08 PROCEDURE — 2580000003 HC RX 258: Performed by: PHYSICIAN ASSISTANT

## 2022-02-08 PROCEDURE — 2060000000 HC ICU INTERMEDIATE R&B

## 2022-02-08 PROCEDURE — 3700000001 HC ADD 15 MINUTES (ANESTHESIA): Performed by: ORTHOPAEDIC SURGERY

## 2022-02-08 PROCEDURE — 2580000003 HC RX 258: Performed by: STUDENT IN AN ORGANIZED HEALTH CARE EDUCATION/TRAINING PROGRAM

## 2022-02-08 PROCEDURE — 27132 TOTAL HIP ARTHROPLASTY: CPT | Performed by: ORTHOPAEDIC SURGERY

## 2022-02-08 PROCEDURE — 2720000010 HC SURG SUPPLY STERILE: Performed by: ORTHOPAEDIC SURGERY

## 2022-02-08 PROCEDURE — 73501 X-RAY EXAM HIP UNI 1 VIEW: CPT

## 2022-02-08 PROCEDURE — 86923 COMPATIBILITY TEST ELECTRIC: CPT

## 2022-02-08 PROCEDURE — 88311 DECALCIFY TISSUE: CPT

## 2022-02-08 PROCEDURE — 6370000000 HC RX 637 (ALT 250 FOR IP): Performed by: STUDENT IN AN ORGANIZED HEALTH CARE EDUCATION/TRAINING PROGRAM

## 2022-02-08 PROCEDURE — 88304 TISSUE EXAM BY PATHOLOGIST: CPT

## 2022-02-08 PROCEDURE — A4217 STERILE WATER/SALINE, 500 ML: HCPCS | Performed by: ORTHOPAEDIC SURGERY

## 2022-02-08 PROCEDURE — 7100000000 HC PACU RECOVERY - FIRST 15 MIN: Performed by: ORTHOPAEDIC SURGERY

## 2022-02-08 PROCEDURE — 6360000002 HC RX W HCPCS: Performed by: NURSE ANESTHETIST, CERTIFIED REGISTERED

## 2022-02-08 PROCEDURE — 3700000000 HC ANESTHESIA ATTENDED CARE: Performed by: ORTHOPAEDIC SURGERY

## 2022-02-08 PROCEDURE — C1713 ANCHOR/SCREW BN/BN,TIS/BN: HCPCS | Performed by: ORTHOPAEDIC SURGERY

## 2022-02-08 PROCEDURE — 6360000002 HC RX W HCPCS: Performed by: ORTHOPAEDIC SURGERY

## 2022-02-08 PROCEDURE — 2500000003 HC RX 250 WO HCPCS: Performed by: NURSE ANESTHETIST, CERTIFIED REGISTERED

## 2022-02-08 PROCEDURE — 2500000003 HC RX 250 WO HCPCS: Performed by: ORTHOPAEDIC SURGERY

## 2022-02-08 PROCEDURE — 0SPB0JZ REMOVAL OF SYNTHETIC SUBSTITUTE FROM LEFT HIP JOINT, OPEN APPROACH: ICD-10-PCS | Performed by: ORTHOPAEDIC SURGERY

## 2022-02-08 PROCEDURE — 88300 SURGICAL PATH GROSS: CPT

## 2022-02-08 PROCEDURE — C1776 JOINT DEVICE (IMPLANTABLE): HCPCS | Performed by: ORTHOPAEDIC SURGERY

## 2022-02-08 PROCEDURE — 97165 OT EVAL LOW COMPLEX 30 MIN: CPT

## 2022-02-08 PROCEDURE — P9016 RBC LEUKOCYTES REDUCED: HCPCS

## 2022-02-08 DEVICE — STEM FEM SZ 3 L102MM NK L30MM 38MM OFFSET 127DEG HIP TI: Type: IMPLANTABLE DEVICE | Site: HIP | Status: FUNCTIONAL

## 2022-02-08 DEVICE — SCREW BNE L20MM DIA65MM LO PROF HEX TRIDENT LL: Type: IMPLANTABLE DEVICE | Site: HIP | Status: FUNCTIONAL

## 2022-02-08 DEVICE — Z DUP USE 2377968 SCREW ACET HEX LOW PROFILE 6.5X25MM TRIDENT II: Type: IMPLANTABLE DEVICE | Site: HIP | Status: FUNCTIONAL

## 2022-02-08 DEVICE — HEAD FEM DIA36MM +10MM OFFSET HIP CO CHROM POLYETH TAPR LO: Type: IMPLANTABLE DEVICE | Site: HIP | Status: FUNCTIONAL

## 2022-02-08 DEVICE — INSERT ACET D 0 DEG 36 MM HIP X3 TRIDENT: Type: IMPLANTABLE DEVICE | Site: HIP | Status: FUNCTIONAL

## 2022-02-08 DEVICE — IMPLANTABLE DEVICE: Type: IMPLANTABLE DEVICE | Site: HIP | Status: FUNCTIONAL

## 2022-02-08 RX ORDER — LIDOCAINE HYDROCHLORIDE 20 MG/ML
INJECTION, SOLUTION EPIDURAL; INFILTRATION; INTRACAUDAL; PERINEURAL PRN
Status: DISCONTINUED | OUTPATIENT
Start: 2022-02-08 | End: 2022-02-08 | Stop reason: SDUPTHER

## 2022-02-08 RX ORDER — GLYCOPYRROLATE 1 MG/5 ML
SYRINGE (ML) INTRAVENOUS PRN
Status: DISCONTINUED | OUTPATIENT
Start: 2022-02-08 | End: 2022-02-08 | Stop reason: SDUPTHER

## 2022-02-08 RX ORDER — NEOSTIGMINE METHYLSULFATE 1 MG/ML
INJECTION, SOLUTION INTRAVENOUS PRN
Status: DISCONTINUED | OUTPATIENT
Start: 2022-02-08 | End: 2022-02-08 | Stop reason: SDUPTHER

## 2022-02-08 RX ORDER — SODIUM CHLORIDE 0.9 % (FLUSH) 0.9 %
5-40 SYRINGE (ML) INJECTION PRN
Status: DISCONTINUED | OUTPATIENT
Start: 2022-02-08 | End: 2022-02-08 | Stop reason: HOSPADM

## 2022-02-08 RX ORDER — ASPIRIN 325 MG
325 TABLET, DELAYED RELEASE (ENTERIC COATED) ORAL 2 TIMES DAILY
Qty: 56 TABLET | Refills: 1 | Status: SHIPPED | OUTPATIENT
Start: 2022-02-08 | End: 2022-05-26

## 2022-02-08 RX ORDER — SODIUM CHLORIDE 9 MG/ML
INJECTION, SOLUTION INTRAVENOUS CONTINUOUS
Status: DISCONTINUED | OUTPATIENT
Start: 2022-02-08 | End: 2022-02-09 | Stop reason: HOSPADM

## 2022-02-08 RX ORDER — FENTANYL CITRATE 50 UG/ML
INJECTION, SOLUTION INTRAMUSCULAR; INTRAVENOUS PRN
Status: DISCONTINUED | OUTPATIENT
Start: 2022-02-08 | End: 2022-02-08 | Stop reason: SDUPTHER

## 2022-02-08 RX ORDER — SODIUM CHLORIDE, SODIUM LACTATE, POTASSIUM CHLORIDE, CALCIUM CHLORIDE 600; 310; 30; 20 MG/100ML; MG/100ML; MG/100ML; MG/100ML
INJECTION, SOLUTION INTRAVENOUS CONTINUOUS PRN
Status: DISCONTINUED | OUTPATIENT
Start: 2022-02-08 | End: 2022-02-08 | Stop reason: SDUPTHER

## 2022-02-08 RX ORDER — SODIUM CHLORIDE 0.9 % (FLUSH) 0.9 %
5-40 SYRINGE (ML) INJECTION EVERY 12 HOURS SCHEDULED
Status: DISCONTINUED | OUTPATIENT
Start: 2022-02-08 | End: 2022-02-08 | Stop reason: HOSPADM

## 2022-02-08 RX ORDER — OXYCODONE HYDROCHLORIDE AND ACETAMINOPHEN 5; 325 MG/1; MG/1
1 TABLET ORAL EVERY 6 HOURS PRN
Qty: 28 TABLET | Refills: 0 | Status: SHIPPED | OUTPATIENT
Start: 2022-02-08 | End: 2022-02-15

## 2022-02-08 RX ORDER — SODIUM CHLORIDE 0.9 % (FLUSH) 0.9 %
10 SYRINGE (ML) INJECTION PRN
Status: DISCONTINUED | OUTPATIENT
Start: 2022-02-08 | End: 2022-02-09 | Stop reason: HOSPADM

## 2022-02-08 RX ORDER — PROPOFOL 10 MG/ML
INJECTION, EMULSION INTRAVENOUS PRN
Status: DISCONTINUED | OUTPATIENT
Start: 2022-02-08 | End: 2022-02-08 | Stop reason: SDUPTHER

## 2022-02-08 RX ORDER — SODIUM CHLORIDE 9 MG/ML
25 INJECTION, SOLUTION INTRAVENOUS PRN
Status: DISCONTINUED | OUTPATIENT
Start: 2022-02-08 | End: 2022-02-08 | Stop reason: HOSPADM

## 2022-02-08 RX ORDER — MEPERIDINE HYDROCHLORIDE 25 MG/ML
12.5 INJECTION INTRAMUSCULAR; INTRAVENOUS; SUBCUTANEOUS EVERY 5 MIN PRN
Status: DISCONTINUED | OUTPATIENT
Start: 2022-02-08 | End: 2022-02-08 | Stop reason: HOSPADM

## 2022-02-08 RX ORDER — OXYBUTYNIN CHLORIDE 5 MG/1
5 TABLET, EXTENDED RELEASE ORAL DAILY
Status: DISCONTINUED | OUTPATIENT
Start: 2022-02-08 | End: 2022-02-09 | Stop reason: HOSPADM

## 2022-02-08 RX ORDER — SODIUM CHLORIDE 0.9 % (FLUSH) 0.9 %
10 SYRINGE (ML) INJECTION EVERY 12 HOURS SCHEDULED
Status: DISCONTINUED | OUTPATIENT
Start: 2022-02-08 | End: 2022-02-09 | Stop reason: HOSPADM

## 2022-02-08 RX ORDER — OXYCODONE HYDROCHLORIDE 5 MG/1
10 TABLET ORAL EVERY 4 HOURS PRN
Status: DISCONTINUED | OUTPATIENT
Start: 2022-02-08 | End: 2022-02-09 | Stop reason: HOSPADM

## 2022-02-08 RX ORDER — TOBRAMYCIN 1.2 G/30ML
INJECTION, POWDER, LYOPHILIZED, FOR SOLUTION INTRAVENOUS PRN
Status: DISCONTINUED | OUTPATIENT
Start: 2022-02-08 | End: 2022-02-08 | Stop reason: ALTCHOICE

## 2022-02-08 RX ORDER — MIDAZOLAM HYDROCHLORIDE 1 MG/ML
INJECTION INTRAMUSCULAR; INTRAVENOUS PRN
Status: DISCONTINUED | OUTPATIENT
Start: 2022-02-08 | End: 2022-02-08 | Stop reason: SDUPTHER

## 2022-02-08 RX ORDER — ACETAMINOPHEN 325 MG/1
650 TABLET ORAL EVERY 6 HOURS
Status: DISCONTINUED | OUTPATIENT
Start: 2022-02-08 | End: 2022-02-09 | Stop reason: HOSPADM

## 2022-02-08 RX ORDER — PROMETHAZINE HYDROCHLORIDE 25 MG/1
12.5 TABLET ORAL EVERY 6 HOURS PRN
Status: DISCONTINUED | OUTPATIENT
Start: 2022-02-08 | End: 2022-02-09 | Stop reason: HOSPADM

## 2022-02-08 RX ORDER — FERROUS SULFATE 325(65) MG
325 TABLET ORAL 2 TIMES DAILY WITH MEALS
Status: DISCONTINUED | OUTPATIENT
Start: 2022-02-08 | End: 2022-02-09 | Stop reason: HOSPADM

## 2022-02-08 RX ORDER — SENNA AND DOCUSATE SODIUM 50; 8.6 MG/1; MG/1
1 TABLET, FILM COATED ORAL 2 TIMES DAILY
Status: DISCONTINUED | OUTPATIENT
Start: 2022-02-08 | End: 2022-02-09 | Stop reason: HOSPADM

## 2022-02-08 RX ORDER — ROCURONIUM BROMIDE 10 MG/ML
INJECTION, SOLUTION INTRAVENOUS PRN
Status: DISCONTINUED | OUTPATIENT
Start: 2022-02-08 | End: 2022-02-08 | Stop reason: SDUPTHER

## 2022-02-08 RX ORDER — SODIUM CHLORIDE 9 MG/ML
25 INJECTION, SOLUTION INTRAVENOUS PRN
Status: DISCONTINUED | OUTPATIENT
Start: 2022-02-08 | End: 2022-02-09 | Stop reason: HOSPADM

## 2022-02-08 RX ORDER — ONDANSETRON 2 MG/ML
4 INJECTION INTRAMUSCULAR; INTRAVENOUS EVERY 6 HOURS PRN
Status: DISCONTINUED | OUTPATIENT
Start: 2022-02-08 | End: 2022-02-09 | Stop reason: HOSPADM

## 2022-02-08 RX ORDER — ONDANSETRON 2 MG/ML
4 INJECTION INTRAMUSCULAR; INTRAVENOUS
Status: DISCONTINUED | OUTPATIENT
Start: 2022-02-08 | End: 2022-02-08 | Stop reason: HOSPADM

## 2022-02-08 RX ORDER — ONDANSETRON 2 MG/ML
INJECTION INTRAMUSCULAR; INTRAVENOUS PRN
Status: DISCONTINUED | OUTPATIENT
Start: 2022-02-08 | End: 2022-02-08 | Stop reason: SDUPTHER

## 2022-02-08 RX ORDER — OXYCODONE HYDROCHLORIDE 5 MG/1
5 TABLET ORAL EVERY 4 HOURS PRN
Status: DISCONTINUED | OUTPATIENT
Start: 2022-02-08 | End: 2022-02-09 | Stop reason: HOSPADM

## 2022-02-08 RX ADMIN — Medication 2000 MG: at 21:50

## 2022-02-08 RX ADMIN — SODIUM CHLORIDE: 9 INJECTION, SOLUTION INTRAVENOUS at 17:32

## 2022-02-08 RX ADMIN — TRANEXAMIC ACID 500 MG: 1 INJECTION, SOLUTION INTRAVENOUS at 12:03

## 2022-02-08 RX ADMIN — HYDROMORPHONE HYDROCHLORIDE 0.5 MG: 1 INJECTION, SOLUTION INTRAMUSCULAR; INTRAVENOUS; SUBCUTANEOUS at 21:50

## 2022-02-08 RX ADMIN — FERROUS SULFATE TAB 325 MG (65 MG ELEMENTAL FE) 325 MG: 325 (65 FE) TAB at 21:50

## 2022-02-08 RX ADMIN — PROPOFOL 190 MG: 10 INJECTION, EMULSION INTRAVENOUS at 11:55

## 2022-02-08 RX ADMIN — FENTANYL CITRATE 50 MCG: 50 INJECTION, SOLUTION INTRAMUSCULAR; INTRAVENOUS at 11:53

## 2022-02-08 RX ADMIN — Medication 3 MG: at 14:12

## 2022-02-08 RX ADMIN — SODIUM CHLORIDE, POTASSIUM CHLORIDE, SODIUM LACTATE AND CALCIUM CHLORIDE: 600; 310; 30; 20 INJECTION, SOLUTION INTRAVENOUS at 11:43

## 2022-02-08 RX ADMIN — FENTANYL CITRATE 25 MCG: 50 INJECTION, SOLUTION INTRAMUSCULAR; INTRAVENOUS at 14:20

## 2022-02-08 RX ADMIN — ACETAMINOPHEN 650 MG: 325 TABLET ORAL at 21:50

## 2022-02-08 RX ADMIN — LIDOCAINE HYDROCHLORIDE 100 MG: 20 INJECTION, SOLUTION EPIDURAL; INFILTRATION; INTRACAUDAL; PERINEURAL at 11:55

## 2022-02-08 RX ADMIN — FENTANYL CITRATE 25 MCG: 50 INJECTION, SOLUTION INTRAMUSCULAR; INTRAVENOUS at 14:24

## 2022-02-08 RX ADMIN — FENTANYL CITRATE 50 MCG: 50 INJECTION, SOLUTION INTRAMUSCULAR; INTRAVENOUS at 12:51

## 2022-02-08 RX ADMIN — DOCUSATE SODIUM 50 MG AND SENNOSIDES 8.6 MG 1 TABLET: 8.6; 5 TABLET, FILM COATED ORAL at 21:50

## 2022-02-08 RX ADMIN — HYDROMORPHONE HYDROCHLORIDE 0.5 MG: 1 INJECTION, SOLUTION INTRAMUSCULAR; INTRAVENOUS; SUBCUTANEOUS at 14:53

## 2022-02-08 RX ADMIN — Medication 0.5 MG: at 14:12

## 2022-02-08 RX ADMIN — MIDAZOLAM 2 MG: 1 INJECTION INTRAMUSCULAR; INTRAVENOUS at 11:44

## 2022-02-08 RX ADMIN — FENTANYL CITRATE 50 MCG: 50 INJECTION, SOLUTION INTRAMUSCULAR; INTRAVENOUS at 12:30

## 2022-02-08 RX ADMIN — ONDANSETRON 4 MG: 2 INJECTION INTRAMUSCULAR; INTRAVENOUS at 13:47

## 2022-02-08 RX ADMIN — Medication 2000 MG: at 11:59

## 2022-02-08 RX ADMIN — ROCURONIUM BROMIDE 50 MG: 50 INJECTION, SOLUTION INTRAVENOUS at 11:55

## 2022-02-08 RX ADMIN — MEPERIDINE HYDROCHLORIDE 12.5 MG: 25 INJECTION, SOLUTION INTRAMUSCULAR; INTRAVENOUS; SUBCUTANEOUS at 15:24

## 2022-02-08 RX ADMIN — OXYBUTYNIN CHLORIDE 5 MG: 5 TABLET, EXTENDED RELEASE ORAL at 21:50

## 2022-02-08 RX ADMIN — TRANEXAMIC ACID 500 MG: 1 INJECTION, SOLUTION INTRAVENOUS at 15:00

## 2022-02-08 RX ADMIN — OXYCODONE 10 MG: 5 TABLET ORAL at 18:12

## 2022-02-08 RX ADMIN — SODIUM CHLORIDE: 9 INJECTION, SOLUTION INTRAVENOUS at 11:44

## 2022-02-08 RX ADMIN — ROCURONIUM BROMIDE 10 MG: 50 INJECTION, SOLUTION INTRAVENOUS at 13:24

## 2022-02-08 RX ADMIN — ASPIRIN 325 MG: 325 TABLET, COATED ORAL at 21:50

## 2022-02-08 ASSESSMENT — PULMONARY FUNCTION TESTS
PIF_VALUE: 19
PIF_VALUE: 19
PIF_VALUE: 17
PIF_VALUE: 18
PIF_VALUE: 22
PIF_VALUE: 17
PIF_VALUE: 18
PIF_VALUE: 20
PIF_VALUE: 22
PIF_VALUE: 17
PIF_VALUE: 17
PIF_VALUE: 18
PIF_VALUE: 17
PIF_VALUE: 22
PIF_VALUE: 22
PIF_VALUE: 1
PIF_VALUE: 24
PIF_VALUE: 1
PIF_VALUE: 17
PIF_VALUE: 18
PIF_VALUE: 21
PIF_VALUE: 17
PIF_VALUE: 18
PIF_VALUE: 18
PIF_VALUE: 16
PIF_VALUE: 18
PIF_VALUE: 5
PIF_VALUE: 18
PIF_VALUE: 16
PIF_VALUE: 1
PIF_VALUE: 16
PIF_VALUE: 18
PIF_VALUE: 19
PIF_VALUE: 21
PIF_VALUE: 17
PIF_VALUE: 18
PIF_VALUE: 17
PIF_VALUE: 17
PIF_VALUE: 19
PIF_VALUE: 17
PIF_VALUE: 17
PIF_VALUE: 21
PIF_VALUE: 23
PIF_VALUE: 18
PIF_VALUE: 22
PIF_VALUE: 18
PIF_VALUE: 17
PIF_VALUE: 1
PIF_VALUE: 1
PIF_VALUE: 18
PIF_VALUE: 21
PIF_VALUE: 17
PIF_VALUE: 18
PIF_VALUE: 16
PIF_VALUE: 17
PIF_VALUE: 1
PIF_VALUE: 18
PIF_VALUE: 16
PIF_VALUE: 18
PIF_VALUE: 2
PIF_VALUE: 16
PIF_VALUE: 17
PIF_VALUE: 17
PIF_VALUE: 22
PIF_VALUE: 17
PIF_VALUE: 18
PIF_VALUE: 45
PIF_VALUE: 37
PIF_VALUE: 18
PIF_VALUE: 17
PIF_VALUE: 19
PIF_VALUE: 17
PIF_VALUE: 22
PIF_VALUE: 23
PIF_VALUE: 18
PIF_VALUE: 21
PIF_VALUE: 20
PIF_VALUE: 17
PIF_VALUE: 22
PIF_VALUE: 16
PIF_VALUE: 0
PIF_VALUE: 22
PIF_VALUE: 19
PIF_VALUE: 18
PIF_VALUE: 18
PIF_VALUE: 16
PIF_VALUE: 18
PIF_VALUE: 18
PIF_VALUE: 17
PIF_VALUE: 18
PIF_VALUE: 16
PIF_VALUE: 17
PIF_VALUE: 18
PIF_VALUE: 17
PIF_VALUE: 2
PIF_VALUE: 17
PIF_VALUE: 22
PIF_VALUE: 18
PIF_VALUE: 18
PIF_VALUE: 20
PIF_VALUE: 17
PIF_VALUE: 16
PIF_VALUE: 19
PIF_VALUE: 18
PIF_VALUE: 16
PIF_VALUE: 22
PIF_VALUE: 17
PIF_VALUE: 6
PIF_VALUE: 19
PIF_VALUE: 18
PIF_VALUE: 0
PIF_VALUE: 20
PIF_VALUE: 23
PIF_VALUE: 22
PIF_VALUE: 18
PIF_VALUE: 16
PIF_VALUE: 18
PIF_VALUE: 16
PIF_VALUE: 18
PIF_VALUE: 18
PIF_VALUE: 17
PIF_VALUE: 17
PIF_VALUE: 19
PIF_VALUE: 17
PIF_VALUE: 19
PIF_VALUE: 19
PIF_VALUE: 17
PIF_VALUE: 17
PIF_VALUE: 18
PIF_VALUE: 17
PIF_VALUE: 18
PIF_VALUE: 16
PIF_VALUE: 18
PIF_VALUE: 20
PIF_VALUE: 22
PIF_VALUE: 18
PIF_VALUE: 23
PIF_VALUE: 17
PIF_VALUE: 16
PIF_VALUE: 21
PIF_VALUE: 19
PIF_VALUE: 21
PIF_VALUE: 18
PIF_VALUE: 18
PIF_VALUE: 16
PIF_VALUE: 15
PIF_VALUE: 15
PIF_VALUE: 19

## 2022-02-08 ASSESSMENT — LIFESTYLE VARIABLES: SMOKING_STATUS: 1

## 2022-02-08 ASSESSMENT — ENCOUNTER SYMPTOMS
SORE THROAT: 0
PHOTOPHOBIA: 0
DIARRHEA: 0
NAUSEA: 0
VOMITING: 0
ABDOMINAL DISTENTION: 0
ABDOMINAL PAIN: 0
COUGH: 0
CONSTIPATION: 0
TROUBLE SWALLOWING: 0
SHORTNESS OF BREATH: 0
EYE PAIN: 0

## 2022-02-08 ASSESSMENT — PAIN - FUNCTIONAL ASSESSMENT
PAIN_FUNCTIONAL_ASSESSMENT: ACTIVITIES ARE NOT PREVENTED
PAIN_FUNCTIONAL_ASSESSMENT: ACTIVITIES ARE NOT PREVENTED
PAIN_FUNCTIONAL_ASSESSMENT: 0-10
PAIN_FUNCTIONAL_ASSESSMENT: ACTIVITIES ARE NOT PREVENTED

## 2022-02-08 ASSESSMENT — PAIN DESCRIPTION - ORIENTATION
ORIENTATION: LEFT

## 2022-02-08 ASSESSMENT — PAIN DESCRIPTION - LOCATION
LOCATION: HIP

## 2022-02-08 ASSESSMENT — PAIN SCALES - GENERAL
PAINLEVEL_OUTOF10: 7
PAINLEVEL_OUTOF10: 7
PAINLEVEL_OUTOF10: 8
PAINLEVEL_OUTOF10: 7
PAINLEVEL_OUTOF10: 5
PAINLEVEL_OUTOF10: 10
PAINLEVEL_OUTOF10: 10
PAINLEVEL_OUTOF10: 8

## 2022-02-08 ASSESSMENT — PAIN DESCRIPTION - PAIN TYPE
TYPE: ACUTE PAIN;SURGICAL PAIN
TYPE: ACUTE PAIN;SURGICAL PAIN
TYPE: SURGICAL PAIN

## 2022-02-08 ASSESSMENT — PAIN DESCRIPTION - PROGRESSION
CLINICAL_PROGRESSION: NOT CHANGED
CLINICAL_PROGRESSION: GRADUALLY WORSENING
CLINICAL_PROGRESSION: GRADUALLY WORSENING

## 2022-02-08 ASSESSMENT — PAIN DESCRIPTION - DESCRIPTORS
DESCRIPTORS: ACHING;SORE
DESCRIPTORS: ACHING;DISCOMFORT
DESCRIPTORS: ACHING
DESCRIPTORS: ACHING;DULL;DISCOMFORT

## 2022-02-08 ASSESSMENT — PAIN DESCRIPTION - ONSET
ONSET: ON-GOING

## 2022-02-08 ASSESSMENT — PAIN DESCRIPTION - FREQUENCY
FREQUENCY: INTERMITTENT
FREQUENCY: CONTINUOUS
FREQUENCY: INTERMITTENT
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
## 2022-02-08 NOTE — PROGRESS NOTES
Per ortho resident anteriolateral approach was taken on this patient for surgery.  Electronically signed by Megan Kaiser RN on 2/8/2022 at 5:09 PM

## 2022-02-08 NOTE — ANESTHESIA PRE PROCEDURE
Department of Anesthesiology  Preprocedure Note       Name:  Nell Franks   Age:  52 y.o.  :  1975                                          MRN:  [id number]         Date:  2022      Surgeon: Rick Watson):  Kristina Shannon DO    Procedure: Procedure(s):  CONVERSION OF PRIOR HIP SURGERY TO LEFT TOTAL HIP ARTHROPLASTY ROBOTIC ASSISTED CIARA +++TIERRA++   +SYNTHES+    Medications prior to admission:   Prior to Admission medications    Medication Sig Start Date End Date Taking? Authorizing Provider   aspirin 325 MG EC tablet Take 1 tablet by mouth 2 times daily 22  Elizabethpeggy Young DO   oxyCODONE-acetaminophen (PERCOCET) 5-325 MG per tablet Take 1 tablet by mouth every 6 hours as needed for Pain for up to 7 days. Intended supply: 7 days. Take lowest dose possible to manage pain 2/8/22 2/15/22  Elizabeth Young DO   baclofen (LIORESAL) 10 MG tablet Take 2 tablets by mouth 3 times daily 22   Maurizio Farnsworth MD   oxybutynin (DITROPAN-XL) 5 MG extended release tablet Take 1 tablet by mouth daily 21   Maurizio Farnsworth MD       Current medications:    No current facility-administered medications for this visit. Current Outpatient Medications   Medication Sig Dispense Refill    aspirin 325 MG EC tablet Take 1 tablet by mouth 2 times daily 56 tablet 1    oxyCODONE-acetaminophen (PERCOCET) 5-325 MG per tablet Take 1 tablet by mouth every 6 hours as needed for Pain for up to 7 days. Intended supply: 7 days.  Take lowest dose possible to manage pain 28 tablet 0     Facility-Administered Medications Ordered in Other Visits   Medication Dose Route Frequency Provider Last Rate Last Admin    0.9 % sodium chloride infusion  25 mL IntraVENous PRN WALTER Talbot        ceFAZolin (ANCEF) 2000 mg in sterile water 20 mL IV syringe  2,000 mg IntraVENous On Call to [address] [facility]        sodium chloride flush 0.9 % injection 5-40 mL  5-40 mL IntraVENous 2 times per day Gold Ayala WALTER Benites        sodium chloride flush 0.9 % injection 5-40 mL  5-40 mL IntraVENous PRN FranklinWALTER Coronado        tranexamic acid (CYKLOKAPRON) 500 mg in dextrose 5 % 100 mL IVPB  500 mg IntraVENous Once Franklin WALTER Renee        tranexamic acid (CYKLOKAPRON) 500 mg in dextrose 5 % 100 mL IVPB  500 mg IntraVENous Once Franklin WALTER Renee           Allergies:  No Known Allergies    Problem List:    Patient Active Problem List   Diagnosis Code    Inspiratory wheeze on examination R06.2    Osteoarthritis resulting from hip dysplasia on one side, left M16.32    Closed intertrochanteric fracture of hip, left, sequela S72.142S    Weakness of left leg R29.898    Muscle weakness of left arm M62.81    Acute left-sided low back pain without sciatica M54.50    Preop testing Z01.818    S/P total left hip arthroplasty Z96.642       Past Medical History:        Diagnosis Date    Chronic hip pain     Urinary incontinence        Past Surgical History:        Procedure Laterality Date    COLONOSCOPY N/A 1/12/2022    COLORECTAL CANCER SCREENING, NOT HIGH RISK performed by Jyoti Templeton DO at [facility] Left     upper femur  (approx 2017)    HIP SURGERY Left 07/09/2016    IM NAILING LEFT HIP    TUBAL LIGATION         Social History:    Social History     Tobacco Use    Smoking status: Current Every Day Smoker     Packs/day: 0.50     Types: Cigarettes    Smokeless tobacco: Never Used   Substance Use Topics    Alcohol use: No                                Ready to quit: Not Answered  Counseling given: Not Answered      Vital Signs (Current): There were no vitals filed for this visit.                                            BP Readings from Last 3 Encounters:   02/08/22 129/87   02/02/22 129/81   01/12/22 (!) 170/108       NPO Status:                                                                                 BMI:   Wt Readings from Last 3 Encounters:   02/08/22 120 lb (54.4 kg)   02/02/22 120 lb (54.4 kg)   01/12/22 120 lb (54.4 kg)     There is no height or weight on file to calculate BMI.    CBC:   Lab Results   Component Value Date    WBC 4.8 02/02/2022    RBC 3.65 02/02/2022    HGB 8.0 02/02/2022    HCT 27.3 02/02/2022    MCV 74.8 02/02/2022    RDW 18.1 02/02/2022     02/02/2022       CMP:   Lab Results   Component Value Date     02/02/2022    K 4.4 02/02/2022     02/02/2022    CO2 24 02/02/2022    BUN 7 02/02/2022    CREATININE 0.8 02/02/2022    GFRAA >60 02/02/2022    LABGLOM >60 02/02/2022    GLUCOSE 89 02/02/2022    PROT 7.1 02/02/2022    CALCIUM 9.1 02/02/2022    BILITOT <0.2 02/02/2022    ALKPHOS 61 02/02/2022    AST 14 02/02/2022    ALT 7 02/02/2022       POC Tests: No results for input(s): POCGLU, POCNA, POCK, POCCL, POCBUN, POCHEMO, POCHCT in the last 72 hours.     Coags:   Lab Results   Component Value Date    PROTIME 11.4 02/02/2022    INR 1.1 02/02/2022    APTT 27.1 07/08/2016       HCG (If Applicable):   Lab Results   Component Value Date    PREGTESTUR negative 07/08/2016        ABGs: No results found for: PHART, PO2ART, VYM9IJE, EVK9IUL, BEART, J1XOOYVV     Type & Screen (If Applicable):  No results found for: LABABO, LABRH    Drug/Infectious Status (If Applicable):  No results found for: HIV, HEPCAB    COVID-19 Screening (If Applicable):   Lab Results   Component Value Date    COVID19 Not Detected 02/02/2022           Anesthesia Evaluation  Patient summary reviewed and Nursing notes reviewed no history of anesthetic complications:   Airway: Mallampati: II  TM distance: >3 FB   Neck ROM: full  Mouth opening: > = 3 FB Dental:          Pulmonary:normal exam  breath sounds clear to auscultation  (+) current smoker    (-) COPD and sleep apnea                           Cardiovascular:  Exercise tolerance: good (>4 METS),         ECG reviewed  Rhythm: regular  Rate: normal           Beta Blocker:  Not on Beta Blocker         Neuro/Psych: ROS comment: Weakness of left leg  Muscle weakness of left arm  Acute left-sided low back pain without sciatica    Hip dysplasia on the left with associated chronic pain managed with tramadol. Needs a cane for ambulation assistance. GI/Hepatic/Renal:            ROS comment: CRCS. Endo/Other:    (+) blood dyscrasia (8.6/29.2): arthritis: OA., . Pt had no PAT visit        ROS comment: History of closed intertrochanteric fracture of left hip  Thrombocytosis Abdominal:         (-) obese       Vascular: negative vascular ROS. Other Findings:               Anesthesia Plan      general     ASA 3       Induction: intravenous. BIS  MIPS: Postoperative opioids intended and Prophylactic antiemetics administered. Anesthetic plan and risks discussed with patient and sibling. Use of blood products discussed with patient whom consented to blood products. Plan discussed with CRNA. Eliezer Allen MD   2/8/2022              Patient seen; chart reviewed and agree with above.     RANJANA Green - DEBRA

## 2022-02-08 NOTE — PROGRESS NOTES
Physical Therapy    Facility/Department: [facility] SURGERY  Initial Assessment    NAME: Jarad Lee  : 1975  MRN: [id number]    Date of Service: 2022       REQUIRES PT FOLLOW UP: Yes       Patient Diagnosis(es): The primary encounter diagnosis was Preop testing. A diagnosis of S/P total left hip arthroplasty was also pertinent to this visit. has a past medical history of Chronic hip pain and Urinary incontinence. has a past surgical history that includes Tubal ligation; hip surgery (Left, 2016); Colonoscopy (N/A, 2022); fracture surgery (Left); and Total hip arthroplasty (Left, 2022). Evaluating Therapist: Masood Young, PT     Referring Provider:        DO mary Zambrano. PT order : PT eval and treat     Room #: 718  DIAGNOSIS: chronic hip pain, 1. Removal of deep implants left hip  2. Conversion to left total hip arthroplasty using [place] [employer] robotic assistance 2022     PRECAUTIONS:  Falls, anterolateral  Hip precautions , WBAT     Social:  Pt lives with  Spouse  in a  2  floor plan  steps and 1  rails to enter. Prior to admission pt walked with cane   Pt plans to stay with her daughter in a 1 floor home upon discharge     Initial Evaluation  Date: 2022 Treatment      Short Term/ Long Term   Goals   Was pt agreeable to Eval/treatment? Yes      Does pt have pain?   L hip      Bed Mobility  Rolling:  NT   Supine to sit:  Min assist   Sit to supine:  NT   Scooting:  Min assist    SBA    Transfers Sit to stand:  Min assist   Stand to sit: min assist   Stand pivot:  NT    SBA    Ambulation    35  feet with  ww  with  Min assist    100 feet+  with  ww  with  SBA        Stair negotiation: ascended and descended NT    4  steps with  1  rail with SBA/CGA   LE ROM  decreased throughout hip, L knee flexion contracture      LE strength  2-/ 5 L hip and knee   3-/ 5 L hip and knee    AM- PAC RAW score   16/ 24            Pt is alert and Oriented x  4 Balance:  Min assist    Endurance: WFL   Bed/Chair alarm: no      ASSESSMENT  Pt displays functional ability as noted in the objective portion of this evaluation. Conditions Requiring Skilled Therapeutic Intervention:    [x]Decreased strength     [x]Decreased ROM  [x]Decreased functional mobility  [x]Decreased balance   [x]Decreased endurance   [x]Decreased posture  []Decreased sensation  []Decreased coordination   []Decreased vision  []Decreased safety awareness   [x]Increased pain         Treatment/Education:    Pt in bed upon arrival; agreeable to PT. Her daughter is present. In  bed; L LE in an IR position . Instructed pt to attempt to keep L hip in a neutral position while in bed. Pt reports amb on L toes/ball on foot prior to surgery. L knee lacks approx 20 -25 degrees of ext. Mobility as above. Cues for proper technique given with all mobility; min assist with L LE with supine to sit. Cues for hand placement with transfers and ww safety and sequencing with gait. Pt educated on fall risk, safe and proper technique with all mobility        Patient response to education:   Pt verbalized understanding Pt demonstrated skill Pt requires further education in this area   x  with cues/assist  x       Comments:  Pt left  In chair after session, with call light in reach. Rehab potential is Good for reaching above PT goals. Pts/ family goals   1. Home     Patient and or family understand(s) diagnosis, prognosis, and plan of care. - yes     PLAN  PT care will be provided in accordance with the objectives noted above. Whenever appropriate, clear delegation orders will be provided for nursing staff. Exercises and functional mobility practice will be used as well as appropriate assistive devices or modalities to obtain goals. Patient and family education will also be administered as needed.         PLAN OF CARE:    Current Treatment Recommendations     [x] Strengthening to improve independence with functional mobility   [x] ROM to improve independence with functional mobility   [x] Balance Training to improve static/dynamic balance and to reduce fall risk  [x] Endurance Training to improve activity tolerance during functional mobility   [x] Transfer Training to improve safety and independence with all functional transfers   [x] Gait Training to improve gait mechanics, endurance and assess need for appropriate assistive device  [x] Stair Training in preparation for safe discharge home and/or into the community   [x] Positioning to prevent skin breakdown and contractures  [x] Safety and Education Training   [x] Patient/Caregiver Education   [x] HEP  [] Other     Frequency of treatments will be BID x 2 days. Time in: 1655   Time out: 1715       Evaluation Time includes thorough review of current medical information, gathering information on past medical history/social history and prior level of function, completion of standardized testing/informal observation of tasks, assessment of data and education on plan of care and goals.     CPT codes:  [] Low Complexity PT evaluation 10436  [x] Moderate Complexity PT evaluation 17141  [] High Complexity PT evaluation 19436  [] PT Re-evaluation 09648  [] Gait training 28827  minutes  [] Therapeutic activities 32878  minutes  [] Therapeutic exercises 00830  minutes  [] Neuromuscular reeducation 65400  minutes       Drake 18 number:  PT 2324

## 2022-02-08 NOTE — PROGRESS NOTES
Database initiated pharmacy and medications verified with the patient. She is A&O from home ambulates with a cane and is RA at baseline.

## 2022-02-08 NOTE — PLAN OF CARE
Problem: Falls - Risk of:  Goal: Will remain free from falls  Description: Will remain free from falls  Outcome: Met This Shift     Problem: Falls - Risk of:  Goal: Absence of physical injury  Description: Absence of physical injury  Outcome: Met This Shift     Problem: Cardiac:  Goal: Cardiovascular alteration will improve  Description: Cardiovascular alteration will improve  Outcome: Met This Shift

## 2022-02-08 NOTE — INTERVAL H&P NOTE
Update History & Physical    The patient's History and Physical of January 28, 2022 was reviewed with the patient and I examined the patient. There was no change. The surgical site was confirmed by the patient and me. Plan: Conversion to left total hip arthroplasty, CIARA medina    I have explained the risks and complications of the recommended surgery with the patient at length, as well as discussed potential treatment alternatives including nonoperative management. These risks include but are not limited to death or complication from anesthesia, continued pain, nerve tendon or vascular injury, infection, nonunion or malunion, fracture, dislocation, permanent nerve palsy, continued leg length inequality, limb, heterotopic bone formation, aseptic loosening, symptomatic hardware or hardware failure, deep vein thrombosis or pulmonary embolism, unforeseen complications, and need for further surgery, etc.  Patient understood this, asked appropriate questions, which were all answered, and she has elected to proceed with the procedure.     Electronically signed by Caro Burton DO on 2/8/2022 at 11:33 AM

## 2022-02-08 NOTE — PROGRESS NOTES
Occupational Therapy  OCCUPATIONAL THERAPY INITIAL EVALUATION  [facility] [address]    Date: 2022     Patient Name: Elaine Edge  MRN: [id number]  : 1975  Room: [facility]6704Y    Evaluating OT: Jane Jackson  [phone]    Referring Provider: Pacheco Lucas DO  Specific Provider Orders/Date: \"OT eval and treat\" - 2022    Diagnosis: S/P total left hip arthroplasty [J76.458]   Surgery: Patient underwent the following on 2022:  1. Removal of deep implants left hip  2. Conversion to left total hip arthroplasty using Los plasty robotic assistance    Precautions: fall risk, anterolateral hip precautions, FWBAT    Assessment of Current Deficits:   [x] Functional mobility   [x]ADLs  [x] Strength               [x]Cognition   [x] Functional transfers   [x] IADLs         [x] Safety Awareness   [x]Endurance   [] Fine Coordination              [x] Balance      [] Vision/perception   [x]Sensation    []Gross Motor Coordination  [] ROM  [] Delirium                   [] Motor Control     OT PLAN OF CARE   OT POC is based on physician orders, patient diagnosis, and results of clinical assessment.   Frequency/Duration 2-5 days/week for 2 weeks PRN   Specific OT Treatment Interventions to Include:   * Instruction/training on adapted ADL techniques and AE recommendations to increase functional independence within precautions       * Training on energy conservation strategies, correct breathing pattern and techniques to improve independence/tolerance for self-care routine  * Functional transfer/mobility training/DME recommendations for increased independence, safety, and fall prevention  * Patient/Family education to increase follow through with safety techniques and functional independence  * Recommendation of environmental modifications for increased safety with functional transfers/mobility and ADLs  * Therapeutic exercise to improve motor endurance, ROM, and functional strength for ADLs/functional transfers  * Therapeutic activities to facilitate/challenge dynamic balance, stand tolerance for increased safety and independence with ADLs  * Neuro-muscular re-education: facilitation of righting/equilibrium reactions, midline orientation, scapular stability/mobility, normalization of muscle tone, and facilitation of volitional active controled movement    Recommended Adaptive Equipment: TBD     Home Living: Patient lives with her  in a two-floor home; patient's bedroom and the only bathroom are on the second floor. Patient plans to stay at her daughter's home upon discharge; patient's daughter's home is a one-floor setup (few steps to enter). Bathroom Setup: tub shower (no seat, no grab bars) and standard-height toilet - at both patient's home and patient's daughter's home  Equipment Owned: cane    Prior Level of Function (PLOF): Patient was independent with ADLs, IADLs, and functional mobility (with cane) prior to this hospitalization. Patient reported that her  and daughter work alternating shifts, so she will have consistent assistance available upon discharge for completion of ADLs/IADLs. Driving: Yes    Pain Level: Patient reported experiencing pain in her L hip, but did not rate her pain. Cognition: Patient alert and oriented x3. WFL command follow demonstrated. Patient pleasant, cooperative, and motivated to return to [facility] and home environment.   Memory: WFL  Sequencing: WFL  Problem Solving: WFL  Judgement/Safety: WFL grossly    Functional Assessment:  AM-PAC Daily Activity Raw Score: 16/24   Initial Eval Status  Date: 2/8/2022 Treatment Status  Date:  Short Term Goals = Long Term Goals   Feeding Independent  N/A   Grooming Min A  Mod I  (seated/standing at sink)   UB Dressing SBA  Mod I  (including item retrieval)   LB Dressing Mod A  Mod I / Independent - while demonstrating Good adherence to anterolateral hip precautions and Good understanding of use of AE, as needed/appropriate   Bathing Mod A  Mod I / Independent - while demonstrating Good adherence to anterolateral hip precautions and Good understanding of use of AE/DME, as needed/appropriate   Toileting Mod A  Mod I / Independent   Bed Mobility  Supine-to-Sit: Min A   Mod I / Miracle Likes in order to maximize patient's independence with ADLs, re-positioning, and other functional tasks. Functional Transfers Sit-to-Stand: Min A   from EOB  Cues given to facilitate proper hand placement. Independent   Functional Mobility Min A   (with walker) within patient's room. Mod I with functional mobility (with device, as needed/appropriate) in order to maximize independence with ADLs/IADLs and other functional tasks. Balance Sitting: Good  (at EOB)  Standing: Fair  (with walker)  Fair+ dynamic standing balance during completion of ADLs/IADLs and other functional tasks. Activity Tolerance Fair  Patient will demonstrate Good understanding and consistent implementation of energy conservation techniques and work simplification techniques into ADL/IADL routines. Visual/  Perceptual WFL     N/A     Additional Long-Term Goal: Patient will increase functional independence to PLOF in order to allow patient to live in least restrictive environment. Strength: ROM: Additional Information:    R UE  WFL WFL    L UE WFL WFL      Hearing: WFL  Sensation: No complaints of numbness/tingling in B UEs. Tone: WFL  Edema: No    Comments: RN approved patient's participation in [address] [place] [address] activities. Upon arrival, patient supine in bed. At end of session, patient seated in bedside chair with call light and phone within reach, waffle cushion in place, and all lines and tubes intact. Patient would benefit from continued skilled OT to increase safety and independence with completion of ADL/IADL tasks for functional independence and quality of life.      Rehab Potential: Good for established goals. Patient / Family Goal: Patient indicated that she plans to stay at her daughter's home upon discharge. Patient and/or family were instructed on functional diagnosis, prognosis/goals, and OT plan of care. Demonstrated Good understanding. Eval Complexity: Low    Time In: 1700  Time Out: 1715  Total Treatment Time: 0 minutes      Minutes Units   OT Eval Low 13269 15 1   OT Eval Medium 62935     OT Eval High 60285     OT Re-Eval [id number]     Therapeutic Ex 97555     Therapeutic Activities 68164     ADL/Self Care 56683     Orthotic Management 37410     Neuro Re-Ed 84481     Non-Billable Time N/A ---     Evaluation time includes thorough review of current medical information, gathering information on past medical history/social history and prior level of function, completion of standardized testing/informal observation of tasks, assessment of data, and education on plan of care and goals. Rebeca Pearson, OTR/L  License Number: [id number]

## 2022-02-08 NOTE — OP NOTE
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 Removal of deep implants left femur  2. Conversion to left total hip arthroplasty using Los plasty robotic assistance    Surgeon(s):  DO Carlee Mallory MD    Assistant:   Resident: Silvio Snyder DO; Darcy Cruz DO    Anesthesia: General    Estimated Blood Loss (mL): 110    Complications: None    Specimens:   ID Type Source Tests Collected by Time Destination   A : Left Hip Hardware Hardware Hardware Bessy 169DO Rogers 2/8/2022 1245    B : BONE LEFT HIP Bone Hip SURGICAL PATHOLOGY Ramiro Ball DO 2/8/2022 1322        Implants:  Implant Name Type Inv.  Item Serial No.  Lot No. LRB No. Used Action   STEM FEM SZ 3 L102MM NK L30MM 38MM OFFSET 127DEG HIP TI - [id number]  STEM FEM SZ 3 L102MM NK L30MM 38MM OFFSET 127DEG HIP TI  [employer] [facility] [id number] Left 1 Implanted   INSERT ACET D 0 DEG 36 MM HIP X3 TRIDENT - [id number]  INSERT ACET D 0 DEG 36 MM HIP X3 TRIDENT  [employer] [facility] [id number] Left 1 Implanted   SCREW BNE L20MM [id number] LO PROF HEX TRIDENT LL - [id number]  SCREW BNE L20MM [id number] LO PROF HEX TRIDENT LL  [email or web address] [id number] Left 1 Implanted   SCREW ACET HEX LOW PROFILE 6.5X25MM TRIDENT II - [id number]  SCREW ACET HEX LOW PROFILE 6.5X25MM TRIDENT II  [email or web address] [id number] Left 1 Implanted   SHELL ACET MULT HOLE D 50 MM TRIDENT II TRIATHLON - [id number]  SHELL ACET MULT HOLE D 50 MM TRIDENT II TRIATHLON  [employer] [facility] [id number] Left 1 Implanted   HEAD FEM [id number] +10MM OFFSET HIP CO CHROM POLYETH TAPR  - [id number]  HEAD FEM [id number] +10MM OFFSET HIP CO CHROM POLYETH TAPR   [employer] HOWM-WD [id number] Left 1 Implanted         Brief history:    Patient is a pleasant 80-year-old female with a history of intertrochanteric fracture of her left hip that was fixed with a cephalomedullary nail in 2016. Unfortunately patient went on to suffer osteonecrosis of the left hip resulting in significant osteoarthritic changes of the joint and neoacetabularization. Discussed potential treatment options with the patient including conservative and operative intervention as well as the risk and benefits of expected outcomes with the varying courses of treatment. Ultimately patient elected to proceed with surgical intervention. We plan for removal of the left cephalomedullary nail and conversion to total hip arthroplasty using Los assisted robotic technology. Procedure in detail:    The patient was seen and identified outside of the operative theater. The operative extremity being the left lower was marked and identified and agreed upon by all parties present. The patient was brought into the operative theater transferred from the bed to the operative table. Anesthesia was induced by the anesthesia team performed general endotracheal intubation. All bony prominences and neurovascular structures were well-padded when the patient was placed into the lateral decubitus position. The left lower extremity was prepped and draped in the standard sterile orthopedic fashion. Before beginning a formal timeout was called and agreed upon by all parties present. Attention was first turned to extraction of the cephalomedullary nail. The distal interlock scar was identified and incised with 10 blade and blunt dissection was carried down to the screw head. Screwdriver was inserted and the screw was backed out with the aid of fluoroscopy. Her previous hip incisions were incorporated into a anterolateral approach to the.  10 blade was used to incise the skin.   Electrocautery was used to maintain adequate hemostasis throughout the dissection. This was carried down through the previous scar tissue down to the fascia kaylie. The fascia kaylie was incised with electrocautery and the Charnley retractor was then placed taking care to protect all neurovascular structures including the sciatic nerve. At this point the proximal end of the nail was visibly present and cleared of debris. The helical blade was identified under fluoroscopy and a small rent was made over the vastus lateralis in order to gain access with the extraction screwdriver then being place. Once extraction screwdriver had been securely placed into the helical blade it was malleted out. At this point the proximal insertion handle was placed and the nail was removed safely from the left femur. Next the iliac crest and ASIS were identified. The array instrumentation was used to samantha out the appropriate landing zone for the pin sites. 10 blade was used to incise the skin and iliac crest pins were then placed followed by the array which was secured in position and confirmed to be appropriate by the software recognition. The array was then tightened into its final position    At this point attention was then turned to proceeding with exposure of the hip. Electrocautery was used to reflect the anterior one third of the gluteus medius this was carried down to the capsule which was teed up and reflected protecting the proximal neurovascular structures. Once adequate  exposure had been achieved the trochanteric checkpoint was placed as well as the acetabular checkpoint and a reference point was demarcated on the lateral femoral condyle. These 3 points were registered in the [facility] system. At this point the hip was then dislocated and retractors were placed posterior to the femoral neck. PeeplePass software was used to identify the appropriate resection level for the neck cut and this was marked with electrocautery. The neck was incised with oscillating saw and removed.   Attention was then turned to the acetabulum the labrum as well as ligamentum/scar tissue from the erosion process were removed identifying the cotyloid fossa and the floor of the acetabulum. Next all points were registered within the [facility] system along the articular surface and rim of the acetabulum. The robotic reaming arm was then brought in place into position and confirmed to be safely within the planned reaming zone. All soft tissue structures and neurovascular structures were retracted and protected as the reaming sequence commenced. Reaming was taken down to the planned level as anticipated without difficulty. The reamer was then removed the wound was irrigated. Next the acetabular component was placed into position with the insertion handle and then attached to the [facility] robotic arm. Plan trajectory was calibrated and the acetabular component was placed into the appropriate trajectory and then malleted into position confirming that it was well-seated with excellent purchase. Due to the nature of her acetabular defect 2 screws were drilled measured and placed into position within the acetabulum. Next the wound was again copiously irrigated the polyethylene liner was inserted and malleted into the final position. Miroslavaan Friday was used to confirm it was appropriately positioned stable. posterior osteophytes were removed with osteotomes and rongeurs taking care to protect the acetabular component and polyethylene liner. Attention was then turned to the femoral side.  was used to enter the femoral canal followed by insertion T-handle. Scar tissue/debris was removed from the canal and sequential broaching commenced until the appropriate fit was identified and excellent rotational stability was achieved. A trial neck and head were then placed the hip was reduced and taken through range of motion.   Once the hip was found to be appropriately stable with trial implants and confirmed with the [facility] software it was control received another 500 postoperatively. Also the patient did receive 2 g of Ancef perioperatively for infection prophylaxis this will be continued for 24 hours thereafter. The weight patient will be weightbearing as tolerated. Physical therapy has been consulted. Once the patient meets appropriate gains and is doing well from a medical/orthopedic standpoint they will be discharged home. They will plan for follow-up in office in roughly 2 weeks for repeat evaluation and imaging at that time. Should be noted Dr. Tee Perez was present for the entirety of the procedure      Electronically signed by Beau Gupta DO on 2/8/2022     Electronically Signed By  Kelsey Perez D.O.  2/8/2022    NOTE: This report was transcribed using voice recognition software.  Every effort was made to ensure accuracy; however, inadvertent computerized transcription errors may be present

## 2022-02-08 NOTE — ANESTHESIA POSTPROCEDURE EVALUATION
Department of Anesthesiology  Postprocedure Note    Patient: Alvarado Morrell  MRN: [id number]  YOB: 1975  Date of evaluation: 2/8/2022  Time:  5:23 PM     Procedure Summary     Date: 02/08/22 Room / Location: [facility] OR 04 / [facility]    Anesthesia Start: 5451 Anesthesia Stop: 1442    Procedure: CONVERSION OF PRIOR HIP SURGERY TO LEFT TOTAL HIP ARTHROPLASTY ROBOTIC ASSISTED CIARA (Left Hip) Diagnosis: (LEFT HIP OSTEOARTHRITS)    Surgeons: Enrique Blackwell DO Responsible Provider: Bandar Patel MD    Anesthesia Type: general ASA Status: 3          Anesthesia Type: general    Jorge Phase I: Jorge Score: 9    Jorge Phase II:      Last vitals: Reviewed and per EMR flowsheets.        Anesthesia Post Evaluation    Patient location during evaluation: PACU  Patient participation: complete - patient participated  Level of consciousness: awake and alert  Airway patency: patent  Nausea & Vomiting: no nausea and no vomiting  Complications: no  Cardiovascular status: hemodynamically stable  Respiratory status: acceptable  Hydration status: stable  Multimodal analgesia pain management approach

## 2022-02-09 VITALS
BODY MASS INDEX: 21.34 KG/M2 | HEIGHT: 64 IN | RESPIRATION RATE: 16 BRPM | SYSTOLIC BLOOD PRESSURE: 122 MMHG | WEIGHT: 125 LBS | DIASTOLIC BLOOD PRESSURE: 85 MMHG | OXYGEN SATURATION: 100 % | TEMPERATURE: 99.1 F | HEART RATE: 68 BPM

## 2022-02-09 LAB
ANION GAP SERPL CALCULATED.3IONS-SCNC: 10 MMOL/L (ref 7–16)
BUN BLDV-MCNC: 9 MG/DL (ref 6–20)
CALCIUM SERPL-MCNC: 9 MG/DL (ref 8.6–10.2)
CHLORIDE BLD-SCNC: 102 MMOL/L (ref 98–107)
CO2: 24 MMOL/L (ref 22–29)
CREAT SERPL-MCNC: 1.1 MG/DL (ref 0.5–1)
GFR AFRICAN AMERICAN: >60
GFR NON-AFRICAN AMERICAN: 53 ML/MIN/1.73
GLUCOSE BLD-MCNC: 102 MG/DL (ref 74–99)
HCT VFR BLD CALC: 32.2 % (ref 34–48)
HEMOGLOBIN: 9.9 G/DL (ref 11.5–15.5)
MCH RBC QN AUTO: 24.3 PG (ref 26–35)
MCHC RBC AUTO-ENTMCNC: 30.7 % (ref 32–34.5)
MCV RBC AUTO: 79.1 FL (ref 80–99.9)
PDW BLD-RTO: 19.5 FL (ref 11.5–15)
PLATELET # BLD: 290 E9/L (ref 130–450)
PMV BLD AUTO: 9 FL (ref 7–12)
POTASSIUM REFLEX MAGNESIUM: 3.9 MMOL/L (ref 3.5–5)
RBC # BLD: 4.07 E12/L (ref 3.5–5.5)
SODIUM BLD-SCNC: 136 MMOL/L (ref 132–146)
WBC # BLD: 8.5 E9/L (ref 4.5–11.5)

## 2022-02-09 PROCEDURE — 6360000002 HC RX W HCPCS: Performed by: STUDENT IN AN ORGANIZED HEALTH CARE EDUCATION/TRAINING PROGRAM

## 2022-02-09 PROCEDURE — 99024 POSTOP FOLLOW-UP VISIT: CPT | Performed by: PHYSICIAN ASSISTANT

## 2022-02-09 PROCEDURE — 6370000000 HC RX 637 (ALT 250 FOR IP): Performed by: STUDENT IN AN ORGANIZED HEALTH CARE EDUCATION/TRAINING PROGRAM

## 2022-02-09 PROCEDURE — 2580000003 HC RX 258: Performed by: STUDENT IN AN ORGANIZED HEALTH CARE EDUCATION/TRAINING PROGRAM

## 2022-02-09 PROCEDURE — 97110 THERAPEUTIC EXERCISES: CPT

## 2022-02-09 PROCEDURE — 97530 THERAPEUTIC ACTIVITIES: CPT

## 2022-02-09 PROCEDURE — 99231 SBSQ HOSP IP/OBS SF/LOW 25: CPT | Performed by: FAMILY MEDICINE

## 2022-02-09 PROCEDURE — 85027 COMPLETE CBC AUTOMATED: CPT

## 2022-02-09 PROCEDURE — 36415 COLL VENOUS BLD VENIPUNCTURE: CPT

## 2022-02-09 PROCEDURE — 97535 SELF CARE MNGMENT TRAINING: CPT

## 2022-02-09 PROCEDURE — 80048 BASIC METABOLIC PNL TOTAL CA: CPT

## 2022-02-09 RX ORDER — ACETAMINOPHEN 325 MG/1
650 TABLET ORAL EVERY 4 HOURS PRN
Qty: 120 TABLET | Refills: 3 | Status: SHIPPED | OUTPATIENT
Start: 2022-02-09

## 2022-02-09 RX ADMIN — Medication 10 ML: at 08:21

## 2022-02-09 RX ADMIN — OXYCODONE 10 MG: 5 TABLET ORAL at 00:11

## 2022-02-09 RX ADMIN — OXYCODONE 10 MG: 5 TABLET ORAL at 12:17

## 2022-02-09 RX ADMIN — ACETAMINOPHEN 650 MG: 325 TABLET ORAL at 11:44

## 2022-02-09 RX ADMIN — OXYCODONE 10 MG: 5 TABLET ORAL at 08:19

## 2022-02-09 RX ADMIN — FERROUS SULFATE TAB 325 MG (65 MG ELEMENTAL FE) 325 MG: 325 (65 FE) TAB at 08:19

## 2022-02-09 RX ADMIN — Medication 2000 MG: at 04:22

## 2022-02-09 RX ADMIN — OXYCODONE 10 MG: 5 TABLET ORAL at 04:20

## 2022-02-09 RX ADMIN — DOCUSATE SODIUM 50 MG AND SENNOSIDES 8.6 MG 1 TABLET: 8.6; 5 TABLET, FILM COATED ORAL at 08:19

## 2022-02-09 RX ADMIN — OXYBUTYNIN CHLORIDE 5 MG: 5 TABLET, EXTENDED RELEASE ORAL at 08:24

## 2022-02-09 RX ADMIN — ACETAMINOPHEN 650 MG: 325 TABLET ORAL at 04:21

## 2022-02-09 RX ADMIN — ASPIRIN 325 MG: 325 TABLET, COATED ORAL at 08:19

## 2022-02-09 ASSESSMENT — ENCOUNTER SYMPTOMS
CONSTIPATION: 0
COUGH: 0
CHEST TIGHTNESS: 0
DIARRHEA: 0
SHORTNESS OF BREATH: 0
NAUSEA: 0
ABDOMINAL PAIN: 0
RHINORRHEA: 0
SORE THROAT: 0
VOMITING: 0

## 2022-02-09 ASSESSMENT — PAIN SCALES - GENERAL
PAINLEVEL_OUTOF10: 9
PAINLEVEL_OUTOF10: 10
PAINLEVEL_OUTOF10: 9
PAINLEVEL_OUTOF10: 10
PAINLEVEL_OUTOF10: 7

## 2022-02-09 NOTE — PROGRESS NOTES
Physical Therapy    Facility/Department: [address] ORTHO SURGERY  Treatment note    NAME: Flo Multani  : 1975  MRN: [id number]    Date of Service: 2022               Patient Diagnosis(es): The primary encounter diagnosis was Preop testing. A diagnosis of S/P total left hip arthroplasty was also pertinent to this visit. has a past medical history of Chronic hip pain and Urinary incontinence. has a past surgical history that includes Tubal ligation; hip surgery (Left, 2016); Colonoscopy (N/A, 2022); fracture surgery (Left); and Total hip arthroplasty (Left, 2022). Evaluating Therapist: Bryan Candelario PT     Referring Provider:        DO mary Correa. PT order : PT eval and treat     Room #: 718  DIAGNOSIS: chronic hip pain, 1. Removal of deep implants left hip  2. Conversion to left total hip arthroplasty using Los plasty robotic assistance 2022     PRECAUTIONS:  Falls, anterolateral  Hip precautions , WBAT     Social:  Pt lives with  Spouse  in a  2  floor plan  steps and 1  rails to enter. Prior to admission pt walked with cane   Pt plans to stay with her daughter in a 1 floor home upon discharge     Initial Evaluation  Date: 2022 Treatment  22    Short Term/ Long Term   Goals   Was pt agreeable to Eval/treatment? Yes  yes    Does pt have pain?   L hip  L hip pain    Bed Mobility  Rolling:  NT   Supine to sit:  Min assist   Sit to supine:  NT   Scooting:  Min assist  NT  SBA    Transfers Sit to stand:  Min assist   Stand to sit: min assist   Stand pivot:  NT  Sit <>stand SBA  SBA    Ambulation    35  feet with  ww  with  Min assist  125 feet x 2, 10 feet x 2 using [address] [facility] [address] for support SBA for balance  100 feet+  with  ww  with  SBA        Stair negotiation: ascended and descended NT  4 with B rails, 4 with rail/standard cane Mod A, 4 with rail/axillary crutch Min A, step to pattern used  4  steps with  1  rail with SBA/CGA   LE ROM  decreased throughout hip, L knee flexion contracture  NT    LE strength  2-/ 5 L hip and knee   3-/ 5 L hip and knee    AM- PAC RAW score   16/ 24  18/24          Pt is alert and able to follow instruction  Balance: fair dynamic using [employer] for support    Pt performed therapeutic exercise of the following: R LE ankle pumps, L LE quad sets AROM x 20; L LE LAQ's AAROM x 20. Pt received stretch promoting L knee extension, lacking approx 30 degrees, L ankle dorsiflexion as well    Patient education  Pt was educated on exercise promoting circulation and strengthening, UE usage to assist with transfers, gait promoting posture, stair negotiation promoting sequence and A/D placement    Patient response to education:   Pt verbalized understanding Pt demonstrated skill Pt requires further education in this area   yes With instruction yes     ASSESSMENT:   Comments: Pt found in a bedside chair, agreed to Rx. Gait to the hallway, rin slow and consistent, step to pattern used, noted L LE WB through toes due to L LE contractures. Once to the rehab gym, exercise performed. Stairs performed, determined best way for Pt to perform stairs is with a rail/crutch for support,  notified of need for crutches. Pt fatigued after activity. Treatment: Pt practiced and was instructed in the following treatment: therapeutic exercise, transfer training ,gait mechanics, stair negotiation    Pt was left in a bedside chair as found with call light in reach    Time in 0855   Time out 0913   Total Treatment Time 38 minutes   CPT codes:     Therapeutic activities 32181 25 minutes   Therapeutic exercises 59462 13 minutes       Pt is making good progress toward established Physical Therapy goals. Continue with physical therapy current plan of care promoting discharge home after second session today.     Valencia Foley PTA   License Number: [id number]

## 2022-02-09 NOTE — HOME CARE
[facility] received referral. Will follow after discharge. Spoke with patient and verified demographics.   Cedric Moon LPN, [facility]

## 2022-02-09 NOTE — PROGRESS NOTES
[facility] [address]  Progress Note    Chief complaint :  Left hip arthroplasty     Subjective:  No overnight problems. Patient describes feeling good today. She continues to have some pain in her right hip. She did work with PT yesterday. Tolerating diet. Past medical, surgical, family and social history were reviewed, non-contributory, and unchanged unless otherwise stated. Review of Systems   Constitutional: Negative for chills, fatigue and fever. HENT: Negative for congestion, rhinorrhea and sore throat. Respiratory: Negative for cough, chest tightness and shortness of breath. Cardiovascular: Negative for chest pain and palpitations. Gastrointestinal: Negative for abdominal pain, constipation, diarrhea, nausea and vomiting. Genitourinary: Negative for dysuria and frequency. Musculoskeletal: Positive for arthralgias (left hip pain). Neurological: Negative for dizziness and light-headedness. All other systems reviewed and are negative. Objective:  /78   Pulse 67   Temp 98.2 °F (36.8 °C) (Tympanic)   Resp 16   Ht 5' 4\" (1.626 m)   Wt 125 lb (56.7 kg)   LMP 01/25/2022   SpO2 100%   BMI 21.46 kg/m²     Physical Exam  General:  Awake, alert, oriented X 3. Well developed, well nourished, well groomed. No apparent distress. HEENT:  Normocephalic, atraumatic. No scleral icterus. No conjunctival injection. Normal lips, teeth, and gums. No nasal discharge. Neck:  Supple, no cervical adenopathy  Heart:  RRR, no murmurs, gallops, or rubs  Lungs:  CTA bilaterally, bilat symmetrical expansion, no wheeze, rales, or rhonchi  Abdomen:   Bowel sounds present, soft, nontender, no masses, no organomegaly, no peritoneal signs  Extremities: dressing on L hip, C/D/I, atrophy of the left calf with chronic weakness  Skin:  Warm and dry, no open lesions or rash  Neuro:  Cranial nerves 2-12 intact, no focal deficits, sensation intact    Labs:  Recent Results (from the past 24 hour(s))   Basic Metabolic Panel w/ Reflex to MG    Collection Time: 02/09/22  4:45 AM   Result Value Ref Range    Sodium 136 132 - 146 mmol/L    Potassium reflex Magnesium 3.9 3.5 - 5.0 mmol/L    Chloride 102 98 - 107 mmol/L    CO2 24 22 - 29 mmol/L    Anion Gap 10 7 - 16 mmol/L    Glucose 102 (H) 74 - 99 mg/dL    BUN 9 6 - 20 mg/dL    CREATININE 1.1 (H) 0.5 - 1.0 mg/dL    GFR Non-African American 53 >=60 mL/min/1.73    GFR African American >60     Calcium 9.0 8.6 - 10.2 mg/dL   CBC    Collection Time: 02/09/22  4:45 AM   Result Value Ref Range    WBC 8.5 4.5 - 11.5 E9/L    RBC 4.07 3.50 - 5.50 E12/L    Hemoglobin 9.9 (L) 11.5 - 15.5 g/dL    Hematocrit 32.2 (L) 34.0 - 48.0 %    MCV 79.1 (L) 80.0 - 99.9 fL    MCH 24.3 (L) 26.0 - 35.0 pg    MCHC 30.7 (L) 32.0 - 34.5 %    RDW 19.5 (H) 11.5 - 15.0 fL    Platelets 769 757 - 303 E9/L    MPV 9.0 7.0 - 12.0 fL       Radiology and other tests reviewed:  XR HIP 1 VW W PELVIS LEFT   Final Result   Satisfactory alignment status post placement of left hip arthroplasty. FLUORO FOR SURGICAL PROCEDURES   Final Result   Intraprocedural fluoroscopic spot images as above. See separate procedure   report for more information. Assessment:  Active Hospital Problems    Diagnosis Date Noted    S/P total left hip arthroplasty [Z96.642] 02/08/2022    Elevated BP without diagnosis of hypertension [R03.0] 02/08/2022    Iron deficiency anemia [D50.9] 02/08/2022       Plan:  S/P Left TKA for left hip OA  -post op day 1  -Dilaudid and oxycodone per ortho for pain  -PT/OT  -labs daily     Hypoxia, resolved  Secondary to anesthesia.  Off oxygen  -wean oxygen as tolerated  -continue vital monitoring for now  -encouraged incentive spirometry     Urinary incontinence  -continue home oxybutynin     Anemia  chronic and likely secondary to [address], Labs 2021 showed low ferritin, iron and iron saturation  -iron supplement provided  -continue senokot    Elevated Creatinine  - Creatinine elevated to 1.1  - Baseline <1  - Will need outpatient lab to follow     Elevated BP, resolved  No hx of HTN  -continue to monitor vitals for now        Brielle Alcantara, [facility] Resident PGY-2  02/09/22   7:40 AM

## 2022-02-09 NOTE — PROGRESS NOTES
Physical Therapy    Facility/Department: [address] ORTHO SURGERY  Treatment note    NAME: Stew Machado  : 1975  MRN: [id number]    Date of Service: 2022               Patient Diagnosis(es): The primary encounter diagnosis was Preop testing. A diagnosis of S/P total left hip arthroplasty was also pertinent to this visit. has a past medical history of Chronic hip pain and Urinary incontinence. has a past surgical history that includes Tubal ligation; hip surgery (Left, 2016); Colonoscopy (N/A, 2022); fracture surgery (Left); and Total hip arthroplasty (Left, 2022). Evaluating Therapist: Desiree White PT     Referring Provider:        DO mary Aguilar. PT order : PT eval and treat     Room #: 718  DIAGNOSIS: chronic hip pain, 1. Removal of deep implants left hip  2. Conversion to left total hip arthroplasty using Los plasty robotic assistance 2022     PRECAUTIONS:  Falls, anterolateral  Hip precautions , WBAT     Social:  Pt lives with  Spouse  in a  2  floor plan  steps and 1  rails to enter. Prior to admission pt walked with cane   Pt plans to stay with her daughter in a 1 floor home upon discharge     Initial Evaluation  Date: 2022 Treatment  22 PM   Short Term/ Long Term   Goals   Was pt agreeable to Eval/treatment? Yes  yes    Does pt have pain?   L hip  L hip pain    Bed Mobility  Rolling:  NT   Supine to sit:  Min assist   Sit to supine:  NT   Scooting:  Min assist  Supine to sit: Min A  Sit to supine: Min A  SBA    Transfers Sit to stand:  Min assist   Stand to sit: min assist   Stand pivot:  NT  Sit <>stand SBA  SBA    Ambulation    35  feet with  ww  with  Min assist  125 feet x 2, 10 feet x 1 using [employer] for support SBA for balance  100 feet+  with  ww  with  SBA        Stair negotiation: ascended and descended NT  4 with rail/axillary crutch CGA, step to pattern used  4  steps with  1  rail with SBA/CGA   LE ROM  decreased throughout hip, L knee flexion contracture  NT    LE strength  2-/ 5 L hip and knee   3-/ 5 L hip and knee    AM- PAC RAW score   16/ 24  18/24          Pt is alert and able to follow instruction  Balance: fair dynamic using [employer] for support    Pt performed therapeutic exercise of the following: NT  Patient education  Pt and Daughter was educated on UE usage to assist with transfers, gait promoting posture, stair negotiation promoting sequence and crutch placement, L LE slow stretch while supported on a stood with quad sets to be performed to tolerance, assist with bed mobility as needed    Patient response to education:   Pt verbalized understanding Pt demonstrated skill Pt requires further education in this area   yes With instruction yes     ASSESSMENT:   Comments: Pt found in a bedside chair, agreed to Rx. Daughter present all Rx. Pt assisted to and from the bed. Gait to the hallway, rin slow and consistent, step to pattern used, again noted L LE WB through toes due to L LE contractures. Once to the rehab gym, stairs performed. Pt and Daughter states understands safe stair negotiation. Car transfer discussed, gait then back to Pt room. Once in the room, L LE elevated on a stood with promotion of quad sets to tolerance. Pt and Daughter states has no further questions about home safety. Treatment: Pt practiced and was instructed in the following treatment: therapeutic stretch, transfer training, gait mechanics, stair negotiation, car transfer, bed mobility    Pt was left in a bedside chair as found with call light in reach    Time in 1310  Time out 1345   Total Treatment Time 35 minutes   CPT codes:     Therapeutic activities 43281 35 minutes   Therapeutic exercises 79987 0 minutes       Pt is making good progress toward established Physical Therapy goals. Pt to discharge home later today, is in need of family assistance. Continue with physical therapy current plan of care.     Monique Langford PTA   License Number: [id number]

## 2022-02-09 NOTE — PROGRESS NOTES
Department of Orthopedic Surgery   Progress Note    Patient seen and examined, Post Op Day # 1. Pain controlled. No new complaints. Denies chest pain, shortness of breath, calf pain, dizziness/lightheadedness. Denies fevers or chills. Denies N/V. Ambulated yesterday with a walker in PT. Complains of some incisional soreness about the left hip area. Denies buttock/groin pain. Denies numbness, tingling, paresthesias. VITALS:  /78   Pulse 67   Temp 98.2 °F (36.8 °C) (Tympanic)   Resp 16   Ht 5' 4\" (1.626 m)   Wt 125 lb (56.7 kg)   LMP 01/25/2022   SpO2 100%   BMI 21.46 kg/m²     GENERAL: alert, cooperative and no distress  MUSCULOSKELETAL:   left lower extremity:  · Dressing clean, dry and intact. · Compartments soft and compressible, calf non-tender  · Palpable dorsalis pedis and posterior tibialis pulse, brisk cap refill to toes, foot warm and perfused  · Sensation intact to light touch in sural/deep peroneal/superficial peroneal/saphenous/posterior tibial nerve distributions to foot/ankle.   · Demonstrates active ankle plantar/dorsiflexion/great toe extension    CBC:   Lab Results   Component Value Date    WBC 8.5 02/09/2022    HGB 9.9 02/09/2022    HCT 32.2 02/09/2022     02/09/2022     ASSESSMENT  · S/P removal of deep implants left hip, conversion of left total hip arthroplasty using Los robotic assistance, POD #1    PLAN    · Continue physical therapy and protocol: WBAT - LLE  · 24 hour abx coverage  · Deep venous thrombosis prophylaxis - ASA 325mg BID, early mobilization  · PT/OT  · Pain Control: IV and PO  · Monitor H&H  · D/C Plan: Per PT recommendations  · Discussed with Dr. Lilli Banks    Electronically signed by WALTER Carl on 2/9/2022 at 7:46 AM

## 2022-02-09 NOTE — CARE COORDINATION
Case Management: Social Work Case Management Initial Assessment  Jake Guan,         Met with:patient & daughter Barak Kinney verified: address, contacts, phone number, , insurance No  PCP: Sandrita Dillon DO  Pharmacy:   [facility], [facility]. - P [phone] - F [phone]  [address] [facility] [place] [address]  Phone: [phone] Fax: [phone]         Discharge Planning  Patient Status Order: Inpatient Date: 22  Readmission within last 30 days:  no  Current Residence: Private Residence  Living Arrangements:  Spouse/Significant Other   Home Access: 2-story (bed/bath upstairs)  Entrance Stairs-Number of Steps:  n/aSupport Systems:  Spouse/Significant Other  Current Services PTA: None Supplier: n/a  Patient able to perform ADL's: will need assistance  DME used to aid ambulation prior to admission: n/a    Potential Assistance Needed:  N/A  Potential Assistance Purchasing Medications:  No  Does patient want to participate in local refill/meds to beds program?: Yes    Patient agreeable to home care: Yes  Type of Home Care Services:  None  Patient expects to be discharged to:       Prior SNF/Rehab Placement and Facility: [address] [place] [address] acute rehab  Agreeable to SNF/Rehab: No    Choices given to patient: yes    Expected Discharge date:  22  Follow Up Appointment: Best Day/ Time: Monday AM    Social Work Assessment/Plan: Social service met with Moriah Bergeron in [facility] on  and provided her with all ortho discharge protocol. We met again this a.m., along with Ingrid's daughter Corie, and discussed discharge planning. Moriah Bergeron resides in a 2-story home with her  but plans to [facility] at [address] [facility] ([phone]) resides in a ranch home with a tub/shower set up, standard commode. Moriah Bergeron has no agency preference for [address] & DME.  Social work called a referral in to [facility] and DME for a wheeled walker, 3-1 commode, & ETB. Matti Led is expecting to discharge home today.       Electronically signed by SEAN Morris on 2/9/22 at 11:22 AM EST

## 2022-02-09 NOTE — PROGRESS NOTES
Occupational Therapy  OT BEDSIDE TREATMENT NOTE      Date:2022  Patient Name: Harjinder Graham  MRN: [id number]  : 1975  Room: [address] [place], [place] [address]     Evaluating OT: Ro Meyer - [id number]     Referring Provider: Sapphire Donovan DO  Specific Provider Orders/Date: \"OT eval and treat\" - 2022     Diagnosis: S/P total left hip arthroplasty [O26.449]   Surgery: Patient underwent the following on 2022:  1.  Removal of deep implants left hip  2.  Conversion to left total hip arthroplasty using Los plasty robotic assistance     Precautions: fall risk, anterolateral hip precautions, FWBAT     Assessment of Current Deficits:   [x]? Functional mobility             [x]?ADLs           [x]? Strength                  [x]? Cognition   [x]? Functional transfers           [x]? IADLs         [x]? Safety Awareness   [x]? Endurance   []? Fine Coordination              [x]? Balance      []? Vision/perception   [x]? Sensation     []? Gross Motor Coordination  []? ROM           []? Delirium                   []? Motor Control      OT PLAN OF CARE   OT POC is based on physician orders, patient diagnosis, and results of clinical assessment.   Frequency/Duration 2-5 days/week for 2 weeks PRN   Specific OT Treatment Interventions to Include:   * Instruction/training on adapted ADL techniques and AE recommendations to increase functional independence within precautions       * Training on energy conservation strategies, correct breathing pattern and techniques to improve independence/tolerance for self-care routine  * Functional transfer/mobility training/DME recommendations for increased independence, safety, and fall prevention  * Patient/Family education to increase follow through with safety techniques and functional independence  * Recommendation of environmental modifications for increased safety with functional transfers/mobility and ADLs  * Therapeutic exercise to improve motor endurance, ROM, and functional strength for ADLs/functional transfers  * Therapeutic activities to facilitate/challenge dynamic balance, stand tolerance for increased safety and independence with ADLs  * Neuro-muscular re-education: facilitation of righting/equilibrium reactions, midline orientation, scapular stability/mobility, normalization of muscle tone, and facilitation of volitional active controled movement     Recommended Adaptive Equipment: ext tub bench, 3 in 1     Home Living: Patient lives with her  in a two-floor home; patient's bedroom and the only bathroom are on the second floor. Patient plans to stay at her daughter's home upon discharge; patient's daughter's home is a one-floor setup (few steps to enter). Bathroom Setup: tub shower (no seat, no grab bars) and standard-height toilet - at both patient's home and patient's daughter's home  Equipment Owned: cane     Prior Level of Function (PLOF): Patient was independent with ADLs, IADLs, and functional mobility (with cane) prior to this hospitalization. Patient reported that her  and daughter work alternating shifts, so she will have consistent assistance available upon discharge for completion of ADLs/IADLs. Driving: Yes     Pain Level: Patient reported experiencing pain in her L hip, but did not rate her pain. Cognition: Patient alert and oriented x3. WFL command follow demonstrated. Patient pleasant, cooperative, and motivated to return to [facility] and home environment. Memory: WFL  Sequencing: WFL  Problem Solving: WFL  Judgement/Safety: WFL grossly     Functional Assessment:                  AM-PAC Daily Activity Raw Score: 17/24    Initial Eval Status  Date: 2/8/2022 Treatment Status  Date: 2/9/22 Short Term Goals = Long Term Goals   Feeding Independent   N/A   Grooming Min A SBA standing at sink  Mod I  (seated/standing at sink)   UB Dressing SBA  setup Mod I  (including item retrieval)   LB Dressing Mod A  SBA after education on use of AE, AE distributed.  Mod I / Independent - while demonstrating Good adherence to anterolateral hip precautions and Good understanding of use of AE, as needed/appropriate   Bathing Mod A   Mod I / Independent - while demonstrating Good adherence to anterolateral hip precautions and Good understanding of use of AE/DME, as needed/appropriate   Toileting Mod A   Mod I / Independent   Bed Mobility  Supine-to-Sit: Min A    Mod I / Independent in order to maximize patient's independence with ADLs, re-positioning, and other functional tasks. Functional Transfers Sit-to-Stand: Min A   from EOB  Cues given to facilitate proper hand placement. Sit <> stand from chair/mock toilet transfer with use of arms SBA. Patient educated on Ext tub bench transfer using visual aide, patient declined practice of transfer. Independent   Functional Mobility Min A   (with walker) within patient's room.  SBA using [employer] after education on sequencing. Mod I with functional mobility (with device, as needed/appropriate) in order to maximize independence with ADLs/IADLs and other functional tasks. Balance Sitting: Good  (at EOB)  Standing: Fair  (with walker) Standing SBA during ADL  Fair+ dynamic standing balance during completion of ADLs/IADLs and other functional tasks. Activity Tolerance Fair  fair, patient limited by LLE weakness and pain. Patient will demonstrate Good understanding and consistent implementation of energy conservation techniques and work simplification techniques into ADL/IADL routines. Comments:  Patient cleared with nursing and agreeable to therapy. Good understanding of instruction demonstrated. Patient in chair with call light in reach at the end of the session. Education/treatment: ADL and functional transfer/activity performed to increase safety and independence during self care tasks.  Education provided on safety awareness, adl reeducation, functional transfer training, anterolateral precautions, AE, work simplification    · Pt has made good progress towards set goals.      Time In: 8:15  Time Out: 8:57     Min Units   Therapeutic Ex 75067     Therapeutic Activities 48469 12 1   ADL/Self Care 07337 30 2   Orthotic Management 64353     Neuro Re-Ed 89390     Non-Billable Time     TOTAL TIMED TREATMENT 42 1815 [address] [place]/L [address]

## 2022-02-11 NOTE — TELEPHONE ENCOUNTER
Spoke with Richelle Anne on 2-. Call reference # [id number]. Tracking # [id number]   CT Scan LT Hip wo Contrast  CT was performed on 2-2-2022    Per Suri Vogel, the CT Scan has been denied. Suri Vogel did confirm clinicals were received and reviewed but the CT Scan would still be denied. Time frame for [facility] has passed. Patient will be contacted by mail in writing of the denial by Elieser Bañuelos. Appeal instructions will be in writing as well.

## 2022-02-15 DIAGNOSIS — S72.102D CLOSED PERTROCHANTERIC FRACTURE OF LEFT FEMUR WITH ROUTINE HEALING, SUBSEQUENT ENCOUNTER: Primary | ICD-10-CM

## 2022-02-21 ENCOUNTER — OFFICE VISIT (OUTPATIENT)
Dept: ORTHOPEDIC SURGERY | Age: 47
End: 2022-02-21
Payer: COMMERCIAL

## 2022-02-21 ENCOUNTER — HOSPITAL ENCOUNTER (OUTPATIENT)
Dept: GENERAL RADIOLOGY | Age: 47
Discharge: HOME | End: 2022-02-23
Payer: COMMERCIAL

## 2022-02-21 VITALS — TEMPERATURE: 98.7 F

## 2022-02-21 DIAGNOSIS — Z96.642 HISTORY OF TOTAL HIP ARTHROPLASTY, LEFT: ICD-10-CM

## 2022-02-21 DIAGNOSIS — M16.32 OSTEOARTHRITIS RESULTING FROM HIP DYSPLASIA ON ONE SIDE, LEFT: Primary | ICD-10-CM

## 2022-02-21 DIAGNOSIS — S72.102D CLOSED PERTROCHANTERIC FRACTURE OF LEFT FEMUR WITH ROUTINE HEALING, SUBSEQUENT ENCOUNTER: ICD-10-CM

## 2022-02-21 PROCEDURE — 99024 POSTOP FOLLOW-UP VISIT: CPT | Performed by: PHYSICIAN ASSISTANT

## 2022-02-21 PROCEDURE — 73502 X-RAY EXAM HIP UNI 2-3 VIEWS: CPT

## 2022-02-21 PROCEDURE — 99212 OFFICE O/P EST SF 10 MIN: CPT

## 2022-02-21 RX ORDER — OXYCODONE HYDROCHLORIDE AND ACETAMINOPHEN 5; 325 MG/1; MG/1
1 TABLET ORAL EVERY 6 HOURS PRN
Qty: 28 TABLET | Refills: 0 | Status: SHIPPED | OUTPATIENT
Start: 2022-02-21 | End: 2022-02-28

## 2022-02-21 NOTE — PROGRESS NOTES
Chief Complaint   Patient presents with    Follow Up After Procedure     Left hip, removal of deep implants, conv to ROBEL using LOS 2-8-22        OP:SURGEON: Dr. Cortes Davey DO  DATE OF PROCEDURE: 2-8-22  PROCEDURE: 1. Removal of deep implants left femur  2. Conversion to left total hip arthroplasty using Los plasty robotic assistance    POD: 2 weeks    Subjective:  Jag Edwards is following up from the above surgery. She is WBAT on left lower extremity. She ambulates with assistive device, walker. Pain to extremity is moderate and is taking prescribed pain medication, Percocet. They denies numbness or tingling to the left lower extremity. Denies calf pain, chest pain, or shortness of breath. Patient continues to use DVT prophylaxis,  mg BID. Patient is  participating in therapy, home health care . She is doing okay after surgery. She does complain of some pain in the left hip/groin area. States that she is making some progress in home therapy but has difficulty ambulating with a walker due to the discomfort. Review of Systems -  All pertinent positives/negative in HPI     Objective:    General: Alert and oriented X 3, normocephalic atraumatic, external ears and eye normal, sclera clear, no acute distress, respirations easy and unlabored with no audible wheezes, skin warm and dry, speech and dress appropriate for noted age, affect euthymic. Extremity:  Left Lower Extremity  Skin clean dry and intact, without signs of infection   Incision well healed. Sutures were removed and Steri-Strips applied. mild edema noted to the incisional area  Compartments supple throughout thigh and leg  Calf supple and not tender  negative Homans  Demonstrates active motion with knee flexion/extension as well as ankle dorsi/plantar flexion  States sensation intact to touch in sural, deep peroneal, superficial peroneal, saphenous, posterior tibial  nerve distributions to foot/ankle.   Palpable dorsalis pedis and posterior tibialis pulses, cap refill brisk in toes, foot warm/perfused. Temp 98.7 °F (37.1 °C) (Oral)   LMP 01/25/2022     XR:   Pelvis and left hip films demonstrate left total hip arthroplasty with the hardware in stable position and alignment. No evidence of hardware loosening or failure. Assessment:   Diagnosis Orders   1. Osteoarthritis resulting from hip dysplasia on one side, left     2. History of total hip arthroplasty, left       Plan:  X-rays reviewed and discussed. Continue weightbearing as tolerated left lower extremity. Continue aspirin for DVT prophylaxis until 1 month postop. Continue hip dislocation prevention protocol     Continue home therapy. If left hip incision Steri-Strips do not fall off in 7 to 10 days on their own, okay to remove. Percocet will be sent to your pharmacy. Follow-up in 4 weeks for reevaluation and x-rays. Call if any questions or concerns. Electronically signed by WALTER Morin on 2/21/2022 at 10:28 AM  Note: This report was completed using [employer] voiced recognition software. Every effort has been made to ensure accuracy; however, inadvertent computerized transcription errors may be present.

## 2022-02-21 NOTE — PROGRESS NOTES
April Mendes is a 52 y.o. female who presents for follow up of Left hip conversion to ROBEL     SURGEON: Dr. Heri Hogan,   Date of Injury/Surgery: 2-8-22  Date last seen in office: 9-9-2021    Symptoms: feels about the same     New complaints: none      Cast/Splint, Brace, or Dressings: Sutures, clean, dry and intact    Weightbearing: left lower Toe touch weight bearing      Assistive device Crutches - axillary  Participating in therapy (location if yes)? yes, Unsure    Refills Needed: pain meds?

## 2022-02-21 NOTE — PATIENT INSTRUCTIONS
Continue weightbearing as tolerated left lower extremity. Continue aspirin for DVT prophylaxis until 1 month postop. Continue hip dislocation prevention protocol     Continue home therapy. If left hip incision Steri-Strips do not fall off in 7 to 10 days on their own, okay to remove. Percocet will be sent to your pharmacy. Follow-up in 4 weeks for reevaluation and x-rays. Call if any questions or concerns.

## 2022-03-14 DIAGNOSIS — Z96.642 HISTORY OF TOTAL HIP ARTHROPLASTY, LEFT: Primary | ICD-10-CM

## 2022-03-15 ENCOUNTER — TELEPHONE (OUTPATIENT)
Dept: ORTHOPEDIC SURGERY | Age: 47
End: 2022-03-15

## 2022-03-15 DIAGNOSIS — Z96.642 HISTORY OF TOTAL HIP ARTHROPLASTY, LEFT: Primary | ICD-10-CM

## 2022-03-15 NOTE — TELEPHONE ENCOUNTER
Maile Guillen from [facility] PT called office to advise patient is being discharged from [place] [address] and ready for outpatient therapy. Requesting [facility]. Order pended and routed at this time.     Future Appointments   Date Time Provider Brett Estrada   3/21/2022 10:30 AM SCHEDULE, SE ORTHO [facility]   4/20/2022 10:00 AM Broderick Garcia MD [facility] None   5/5/2022  9:45 AM Mikie Marcus DO  Ortho [facility]

## 2022-03-21 ENCOUNTER — OFFICE VISIT (OUTPATIENT)
Dept: ORTHOPEDIC SURGERY | Age: 47
End: 2022-03-21
Payer: COMMERCIAL

## 2022-03-21 ENCOUNTER — HOSPITAL ENCOUNTER (OUTPATIENT)
Dept: GENERAL RADIOLOGY | Age: 47
Discharge: HOME | End: 2022-03-23
Payer: COMMERCIAL

## 2022-03-21 DIAGNOSIS — S72.102D CLOSED PERTROCHANTERIC FRACTURE OF LEFT FEMUR WITH ROUTINE HEALING, SUBSEQUENT ENCOUNTER: Primary | ICD-10-CM

## 2022-03-21 DIAGNOSIS — S72.102D CLOSED PERTROCHANTERIC FRACTURE OF LEFT FEMUR WITH ROUTINE HEALING, SUBSEQUENT ENCOUNTER: ICD-10-CM

## 2022-03-21 DIAGNOSIS — Z96.642 HISTORY OF TOTAL HIP ARTHROPLASTY, LEFT: ICD-10-CM

## 2022-03-21 DIAGNOSIS — M24.562 CONTRACTURE OF LEFT KNEE: ICD-10-CM

## 2022-03-21 DIAGNOSIS — M21.372 LEFT FOOT DROP: ICD-10-CM

## 2022-03-21 PROCEDURE — 73502 X-RAY EXAM HIP UNI 2-3 VIEWS: CPT

## 2022-03-21 PROCEDURE — 73552 X-RAY EXAM OF FEMUR 2/>: CPT

## 2022-03-21 PROCEDURE — 99212 OFFICE O/P EST SF 10 MIN: CPT | Performed by: PHYSICIAN ASSISTANT

## 2022-03-21 PROCEDURE — 99024 POSTOP FOLLOW-UP VISIT: CPT | Performed by: PHYSICIAN ASSISTANT

## 2022-03-21 RX ORDER — TRAMADOL HYDROCHLORIDE 50 MG/1
50 TABLET ORAL EVERY 6 HOURS PRN
COMMUNITY
End: 2022-08-25 | Stop reason: ALTCHOICE

## 2022-03-21 NOTE — PATIENT INSTRUCTIONS
Continue weightbearing as tolerated left lower extremity. Patient will start outpatient PT at [facility] working on aggressive range of motion and strengthening of the left hip, knee and ankle as well as gait training. Follow-up in 3 weeks with Dr. Autumn Mcgraw for reevaluation and x-rays. Call if any questions or concerns.

## 2022-03-21 NOTE — PROGRESS NOTES
Chief Complaint   Patient presents with    Follow Up After Procedure     left hip conversion to ROBEL with CIARA 02/08/22. unable to bear weight to LLE. ambulating with crutches. prescribed tramadol by Dr. Cherri Martell, states not helping with pain. completed [facility], order for outpatient PT placed 03/15/22        OP:SURGEON: Dr. Antoinette Nieves DO  DATE OF PROCEDURE: 2-8-22  PROCEDURE: 1. Removal of deep implants left femur  2. Conversion to left total hip arthroplasty using Ciara plasty robotic assistance     POD: 6 weeks    Subjective:  Natali Cortez is following up from the above surgery. She is WBAT on left lower extremity. She ambulates with assistive device, crutches. Pain to extremity is mild and is not taking prescribed pain medication. They denies numbness or tingling to the left lower extremity. Denies calf pain, chest pain, or shortness of breath. Patient has finished DVT prophylaxis. Patient is  participating in therapy, home health care . Patient states that she still has some stiffness and difficulty with motion in the left leg. She states that she has been unable to completely extend her left knee and she has had a dropfoot since her injury years ago. She states that the left knee contracture and left dropfoot has persisted since the initial injury. She states that she has made progress in home therapy and is ready to start outpatient PT. She states that she has difficulty putting weight on the left leg because she is unable to extend her knee completely and she has the dropfoot. She states that she can only toe-touch weight-bear on the left leg. Review of Systems -  All pertinent positives/negative in HPI     Objective:    General: Alert and oriented X 3, normocephalic atraumatic, external ears and eye normal, sclera clear, no acute distress, respirations easy and unlabored with no audible wheezes, skin warm and dry, speech and dress appropriate for noted age, affect euthymic.     Extremity:  Left Lower Extremity  Skin clean dry and intact, without signs of infection   Incision well healed  no edema noted  Compartments supple throughout thigh and leg  Calf supple and not tender  negative Homans  Demonstrates active motion with hip flexion. Knee arc of motion 25-95. She does have a dropfoot graded at 1/5. She has difficulty ambulating using the crutches. When she ambulates she is only able to toe-touch weight-bear using the crutches. States sensation intact to touch in sural, deep peroneal, superficial peroneal, saphenous, posterior tibial  nerve distributions to foot/ankle. Palpable dorsalis pedis and posterior tibialis pulses, cap refill brisk in toes, foot warm/perfused. There were no vitals taken for this visit. XR:   Pelvis and left hip films demonstrate left total hip arthroplasty with the hardware in stable position and alignment. No evidence of hardware loosening or failure. 2 views left femur demonstrate no evidence of acute fracture. Moderate tricompartmental OA noted at the left knee. Assessment:   Diagnosis Orders   1. Closed pertrochanteric fracture of left femur with routine healing, subsequent encounter  XR FEMUR LEFT (MIN 2 VIEWS)    [facility] - Physical Therapy, [place], [facility]   2. History of total hip arthroplasty, left  [facility] - Physical Therapy, [place], [facility]   3. Contracture of left knee     4. Left foot drop       Plan:  X-rays reviewed and discussed. Patient has left knee contracture and left dropfoot which she states has been chronic since her initial injury years ago. She still has difficulty putting weight on the left leg due to the knee contracture and dropfoot. She toe-touch ambulates using the crutches. Continue weightbearing as tolerated left lower extremity. Patient will start outpatient PT at [facility] working on aggressive range of motion and strengthening of the left hip, knee and ankle as well as gait training.     Follow-up in 3 weeks with Dr. Jen Gonzalez for reevaluation and x-rays. We will see how she is progressing in therapy. Discussed possible referral for Dynasplint and AFO depending on how she is progressing in PT at her next visit. Call if any questions or concerns. Electronically signed by WALTER Hurst on 3/21/2022 at 11:46 AM  Note: This report was completed using [employer] voiced recognition software. Every effort has been made to ensure accuracy; however, inadvertent computerized transcription errors may be present.

## 2022-04-05 ENCOUNTER — TELEPHONE (OUTPATIENT)
Dept: ORTHOPEDIC SURGERY | Age: 47
End: 2022-04-05

## 2022-04-05 DIAGNOSIS — S72.102D CLOSED PERTROCHANTERIC FRACTURE OF LEFT FEMUR WITH ROUTINE HEALING, SUBSEQUENT ENCOUNTER: ICD-10-CM

## 2022-04-05 DIAGNOSIS — Z96.642 HISTORY OF TOTAL HIP ARTHROPLASTY, LEFT: Primary | ICD-10-CM

## 2022-04-07 ENCOUNTER — HOSPITAL ENCOUNTER (OUTPATIENT)
Dept: PHYSICAL THERAPY | Age: 47
Setting detail: THERAPIES SERIES
Discharge: HOME | End: 2022-04-07
Payer: COMMERCIAL

## 2022-04-07 PROCEDURE — 97161 PT EVAL LOW COMPLEX 20 MIN: CPT

## 2022-04-07 NOTE — PROGRESS NOTES
Physical Therapy  Initial Assessment  Date: 2022  Patient Name: Job Mai  MRN: [id number]  : 1975          Restrictions  Restrictions/Precautions  Restrictions/Precautions: Weight Bearing  Lower Extremity Weight Bearing Restrictions  Left Lower Extremity Weight Bearing: Weight Bearing As Tolerated  Position Activity Restriction  Hip Precautions: No ADduction,No hip flexion > 90 degrees,No hip internal rotation    Subjective   General  Chart Reviewed: Yes  Patient assessed for rehabilitation services?: Yes  Additional Pertinent Hx: Patient presents to PT s/p Left Robel 2022 per Dr Donald Barron. Patient had suffered osteo-necrosis of the left acetabulum after a left femur fx in . Surgery also removed the IM nail which had been plkaced during surgery in . Family / Caregiver Present: No  Referring Practitioner: Dr Donald Barron  Referral Date : 22  Diagnosis: s/p Left ROBEL  Follows Commands: Within Functional Limits  PT Visit Information  Onset Date: 22  PT Insurance Information: [employer]  Subjective  Subjective: Chief issue per patient is her inability to fully extend the left knee and DF the left ankle Patient cites these as pre existing issues related to her previous ORIF Currently using 2 crutches to ambulate  Pain Screening  Patient Currently in Pain: No  Vital Signs  Patient Currently in Pain: No    Vision/Hearing  Vision  Vision: Within Functional Limits  Hearing  Hearing: Within functional limits    Orientation  Orientation  Overall Orientation Status: Within Functional Limits    Social/Functional History  Social/Functional History  Lives With: Spouse  Type of Home: House  Home Layout: Two level; Work area in [address]: [address] Help From: Family  Homemaking Responsibilities: Yes  Active : Yes  Mode of Transportation: Car  Occupation: Unemployed    Objective     Observation/Palpation  Posture: Poor  Observation: Left hip flexion contracture Lef tknee flexion contracture functional left foot drop Altered gait mechanics due to inability to extend the left LE Left hip adduction    AROM RLE (degrees)  RLE AROM: WFL  PROM LLE (degrees)  LLE General PROM: Limited hip ROMK LImited knee ROM  AROM LLE (degrees)  LLE General AROM: Limited hip and knee ROM Contractures left hip adn left knee flexors    Strength RLE  Comment: 4 to 4+/5  Strength LLE  Comment: 3 to 4-/5  Tone RLE  RLE Tone: Normotonic  Tone LLE  LLE Tone: Normotonic  Motor Control  Gross Motor?: WFL                   Ambulation 1  Surface: level tile  Device: Axillary Crutches  Assistance: Modified Independent  Gait Deviations: Decreased step length;Decreased step height  Stairs/Curb  Stairs?: No                            Assessment   Conditions Requiring Skilled Therapeutic Intervention  Body structures, Functions, Activity limitations: Decreased functional mobility ; Decreased ADL status; Decreased ROM; Decreased strength;Decreased endurance;Decreased posture  Prognosis: Fair  Decision Making: Low Complexity  REQUIRES PT FOLLOW UP: Yes         Plan   Plan  Times per week: 2-3  Plan weeks: 6  Current Treatment Recommendations: Strengthening,Neuromuscular Re-education,Home Exercise Program,Manual Therapy - Soft Tissue Mobilization,ROM,Balance Training,Endurance Training,Patient/Caregiver Education & Training,Functional Mobility Training,Manual Therapy - Joint Manipulation,Modalities,Gait Training    G-Code       OutComes Score                                                  AM-PAC Score             Goals          Therapy Time   Individual Concurrent Group Co-treatment   Time In 1300         Time Out 1340         Minutes 40         Timed Code Treatment Minutes: 27 Minutes       Hammad Armstrong, PT

## 2022-04-14 ENCOUNTER — HOSPITAL ENCOUNTER (OUTPATIENT)
Dept: GENERAL RADIOLOGY | Age: 47
Discharge: HOME | End: 2022-04-16
Payer: COMMERCIAL

## 2022-04-14 ENCOUNTER — OFFICE VISIT (OUTPATIENT)
Dept: ORTHOPEDIC SURGERY | Age: 47
End: 2022-04-14
Payer: COMMERCIAL

## 2022-04-14 DIAGNOSIS — Z96.642 HISTORY OF TOTAL HIP ARTHROPLASTY, LEFT: Primary | ICD-10-CM

## 2022-04-14 DIAGNOSIS — S72.102D CLOSED PERTROCHANTERIC FRACTURE OF LEFT FEMUR WITH ROUTINE HEALING, SUBSEQUENT ENCOUNTER: ICD-10-CM

## 2022-04-14 DIAGNOSIS — Z96.642 HISTORY OF TOTAL HIP ARTHROPLASTY, LEFT: ICD-10-CM

## 2022-04-14 DIAGNOSIS — M21.372 LEFT FOOT DROP: ICD-10-CM

## 2022-04-14 DIAGNOSIS — M24.562 CONTRACTURE OF LEFT KNEE: ICD-10-CM

## 2022-04-14 PROCEDURE — 99024 POSTOP FOLLOW-UP VISIT: CPT | Performed by: PHYSICIAN ASSISTANT

## 2022-04-14 PROCEDURE — 73552 X-RAY EXAM OF FEMUR 2/>: CPT

## 2022-04-14 PROCEDURE — 99212 OFFICE O/P EST SF 10 MIN: CPT

## 2022-04-14 PROCEDURE — 73502 X-RAY EXAM HIP UNI 2-3 VIEWS: CPT

## 2022-04-14 NOTE — PROGRESS NOTES
Chief Complaint   Patient presents with    Post-Op Check     Left hip conv to ROBEL 2/8/2022        OP:SURGEON: Dr. Quiles Fraction, DO  DATE OF PROCEDURE: 2-8-22  PROCEDURE: 1. Removal of deep implants left femur  2. Conversion to left total hip arthroplasty using Los plasty robotic assistance    POD: 9 weeks    Subjective:  Sebastien Arroyo is following up from the above surgery. She is WBAT on left lower extremity. She ambulates with assistive device, cane. Pain to extremity is none and is not taking prescribed pain medication. They denies numbness or tingling to the left lower extremity. Denies calf pain, chest pain, or shortness of breath. Patient has finished DVT prophylaxis. Patient is not participating in therapy at this time. She is in the process of getting set up for PT at [facility]. She is using the Dynasplint for her left knee contracture. Patient states that she has left knee contracture and left dropfoot which has been chronic since her initial injury years ago. She has difficulty putting weight on her left leg due to the knee contracture and dropfoot and uses crutches. She states that she does still have some stiffness in her left hip. She denies any pain in the hip/groin areas. She is doing quite well after surgery overall. Review of Systems -  All pertinent positives/negative in HPI     Objective:    General: Alert and oriented X 3, normocephalic atraumatic, external ears and eye normal, sclera clear, no acute distress, respirations easy and unlabored with no audible wheezes, skin warm and dry, speech and dress appropriate for noted age, affect euthymic. Extremity:  Left Lower Extremity  Skin clean dry and intact, without signs of infection   Incision well-healed  no edema noted  Compartments supple throughout thigh and leg  Calf supple and not tender  negative Homans  She does have chronic dropfoot  Demonstrates active motion with hip flexion.   Active knee ROM   States sensation intact to touch in sural, deep peroneal, superficial peroneal, saphenous, posterior tibial  nerve distributions to foot/ankle. Palpable dorsalis pedis and posterior tibialis pulses, cap refill brisk in toes, foot warm/perfused. There were no vitals taken for this visit. XR:   Pelvis, left hip and left femur films demonstrate left total hip arthroplasty with the hardware in stable position and alignment. No evidence of hardware loosening or failure. Assessment:   Diagnosis Orders   1. History of total hip arthroplasty, left     2. Contracture of left knee     3. Left foot drop       Plan:  X-rays reviewed and discussed. Continue weightbearing as tolerated left lower extremity. Continue Dynasplint. Patient is scheduled to start PT at [facility]. Discussed that she needs aggressive therapy for the left hip and left lower extremity to help with range of motion and strengthening. Follow-up in 6 weeks for reevaluation and x-rays. Call if any questions or concerns. Electronically signed by WALTER Blackwood on 4/14/2022 at 11:59 AM  Note: This report was completed using [employer] voiced recognition software. Every effort has been made to ensure accuracy; however, inadvertent computerized transcription errors may be present.

## 2022-04-14 NOTE — PROGRESS NOTES
Sheree Macdonald is a 52 y.o. female who presents for follow up of left hip    SURGEON: Dr. Angelica Pham DO  Date of Injury/Surgery: 2/8/2022  Date last seen in office: 3/21/2022    Symptoms: better  New complaints: patient still having mild pain the left hip. She still ambulates with crutches because she feels she cannot put full weight through the hip without falling. Weightbearing: left lower Weight bearing as tolerated      Assistive device Crutches - axillary  Participating in therapy (location if yes)?  No, however patient to start at [facility] when insurance approves    Refills Needed: None  Order/Referral Needed: N/A

## 2022-04-14 NOTE — FLOWSHEET NOTE
[facility]  Phone: [phone] Fax: [phone]     Physical Therapy  Out Patient Initial Evaluation    Date:  2022    Patient Name:  Mane Haney   :  1975 MRN: [id number]    DIAGNOSIS:  S/p Left ROBEL  EVALUATION DATE:  2022  REFERRING PHYSICIAN:  Dr Rincon Shi:  2022 (date of surgery)     PROBLEMS FOUND DURING EVALUATION  · Deficits of ROM left hip and left knee   · Altered/Dysfunctional Gait  · Deficits of strength left LE     SHORT TERM GOALS  · Patient will be able to engage in consistent active exercise while reporting no significant increase in overall left LE pain   · Establish HEP    LONG TERM GOALS  · AROM Left LE will be WFL's all ranges including left ankle   · Patient will be FWB on the left LE Patient will demonstrate [address] or better quality of gait over functional distances needing no assistive device   · Strength left LE 4- to 4/5   · Patient will be able to maintain and self direct an appropriate follow up independent exercise program     PATIENT GOALS  · Regain functional mobility and be able to ambulate     REHAB POTENTIAL:  fair    FREQUENCY/DURATION:  2-3 times per week 6 weeks     PLAN OF CARE:  Progressive exercise including Gait training Manual therapy Modalities Aquatic based active exercise HEP       Thank you for the opportunity to work with your patient. If you have questions or comments, please feel free to contact me by phone or fax.       Electronically Signed by Megha Babb, PT  [id number]        ___________________________________  2022    Physician     Date

## 2022-05-20 DIAGNOSIS — S72.102D CLOSED PERTROCHANTERIC FRACTURE OF LEFT FEMUR WITH ROUTINE HEALING, SUBSEQUENT ENCOUNTER: ICD-10-CM

## 2022-05-20 DIAGNOSIS — Z96.642 HISTORY OF TOTAL HIP ARTHROPLASTY, LEFT: Primary | ICD-10-CM

## 2022-05-26 ENCOUNTER — OFFICE VISIT (OUTPATIENT)
Dept: ORTHOPEDIC SURGERY | Age: 47
End: 2022-05-26
Payer: COMMERCIAL

## 2022-05-26 ENCOUNTER — HOSPITAL ENCOUNTER (OUTPATIENT)
Dept: GENERAL RADIOLOGY | Age: 47
Discharge: HOME | End: 2022-05-28
Payer: COMMERCIAL

## 2022-05-26 DIAGNOSIS — S72.102D CLOSED PERTROCHANTERIC FRACTURE OF LEFT FEMUR WITH ROUTINE HEALING, SUBSEQUENT ENCOUNTER: ICD-10-CM

## 2022-05-26 DIAGNOSIS — Z96.642 HISTORY OF TOTAL HIP ARTHROPLASTY, LEFT: ICD-10-CM

## 2022-05-26 DIAGNOSIS — M21.372 LEFT FOOT DROP: Primary | ICD-10-CM

## 2022-05-26 DIAGNOSIS — M24.562 CONTRACTURE OF LEFT KNEE: ICD-10-CM

## 2022-05-26 PROCEDURE — G8427 DOCREV CUR MEDS BY ELIG CLIN: HCPCS | Performed by: PHYSICIAN ASSISTANT

## 2022-05-26 PROCEDURE — 73552 X-RAY EXAM OF FEMUR 2/>: CPT

## 2022-05-26 PROCEDURE — 99212 OFFICE O/P EST SF 10 MIN: CPT | Performed by: PHYSICIAN ASSISTANT

## 2022-05-26 PROCEDURE — 73502 X-RAY EXAM HIP UNI 2-3 VIEWS: CPT

## 2022-05-26 PROCEDURE — 4004F PT TOBACCO SCREEN RCVD TLK: CPT | Performed by: PHYSICIAN ASSISTANT

## 2022-05-26 PROCEDURE — 99212 OFFICE O/P EST SF 10 MIN: CPT

## 2022-05-26 PROCEDURE — 99213 OFFICE O/P EST LOW 20 MIN: CPT | Performed by: PHYSICIAN ASSISTANT

## 2022-05-26 PROCEDURE — G8420 CALC BMI NORM PARAMETERS: HCPCS | Performed by: PHYSICIAN ASSISTANT

## 2022-05-26 NOTE — PATIENT INSTRUCTIONS
Continue weightbearing as tolerated left lower extremity. Continue home exercise program.    Continue Dynasplint. Patient will be set up with [facility] orthotics for a left AFO for chronic dropfoot. Follow-up in 3 months for reevaluation and x-rays. Call if any questions or concerns.
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
## 2022-08-22 DIAGNOSIS — S72.102D CLOSED PERTROCHANTERIC FRACTURE OF LEFT FEMUR WITH ROUTINE HEALING, SUBSEQUENT ENCOUNTER: ICD-10-CM

## 2022-08-22 DIAGNOSIS — Z96.642 HISTORY OF TOTAL HIP ARTHROPLASTY, LEFT: Primary | ICD-10-CM

## 2022-08-25 ENCOUNTER — OFFICE VISIT (OUTPATIENT)
Dept: ORTHOPEDIC SURGERY | Age: 47
End: 2022-08-25
Payer: COMMERCIAL

## 2022-08-25 ENCOUNTER — HOSPITAL ENCOUNTER (OUTPATIENT)
Dept: GENERAL RADIOLOGY | Age: 47
Discharge: HOME | End: 2022-08-27
Payer: COMMERCIAL

## 2022-08-25 DIAGNOSIS — Z96.642 HISTORY OF TOTAL HIP ARTHROPLASTY, LEFT: ICD-10-CM

## 2022-08-25 DIAGNOSIS — S72.102D CLOSED PERTROCHANTERIC FRACTURE OF LEFT FEMUR WITH ROUTINE HEALING, SUBSEQUENT ENCOUNTER: ICD-10-CM

## 2022-08-25 DIAGNOSIS — Z96.642 HISTORY OF TOTAL HIP ARTHROPLASTY, LEFT: Primary | ICD-10-CM

## 2022-08-25 DIAGNOSIS — R29.898 WEAKNESS OF LEFT LEG: ICD-10-CM

## 2022-08-25 DIAGNOSIS — M16.32 OSTEOARTHRITIS RESULTING FROM HIP DYSPLASIA ON ONE SIDE, LEFT: ICD-10-CM

## 2022-08-25 PROCEDURE — 4004F PT TOBACCO SCREEN RCVD TLK: CPT | Performed by: ORTHOPAEDIC SURGERY

## 2022-08-25 PROCEDURE — 99212 OFFICE O/P EST SF 10 MIN: CPT

## 2022-08-25 PROCEDURE — G8427 DOCREV CUR MEDS BY ELIG CLIN: HCPCS | Performed by: ORTHOPAEDIC SURGERY

## 2022-08-25 PROCEDURE — 73552 X-RAY EXAM OF FEMUR 2/>: CPT

## 2022-08-25 PROCEDURE — 99214 OFFICE O/P EST MOD 30 MIN: CPT | Performed by: ORTHOPAEDIC SURGERY

## 2022-08-25 PROCEDURE — G8420 CALC BMI NORM PARAMETERS: HCPCS | Performed by: ORTHOPAEDIC SURGERY

## 2022-08-25 PROCEDURE — 73502 X-RAY EXAM HIP UNI 2-3 VIEWS: CPT

## 2022-08-25 RX ORDER — BACLOFEN 10 MG/1
20 TABLET ORAL 3 TIMES DAILY
Qty: 60 TABLET | Refills: 0 | Status: SHIPPED | OUTPATIENT
Start: 2022-08-25

## 2022-08-25 RX ORDER — TRAMADOL HYDROCHLORIDE 50 MG/1
50 TABLET ORAL EVERY 8 HOURS PRN
Qty: 30 TABLET | Refills: 0 | Status: SHIPPED | OUTPATIENT
Start: 2022-08-25 | End: 2022-09-04
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
## 2022-08-25 NOTE — PROGRESS NOTES
Hero Chu is a 52 y.o. female who presents for follow up of Left Hip ROBEL using a CIARA    SURGEON: Dr. Dena Silverio DO  Date of Injury/Surgery:  2-8-2022  Date last seen in office:  5-    Symptoms: unchanged  New complaints: Pt described having increased pain in the Left Knee and Left toes. Pt described pain starting on the lateral aspect of the Left hip and moving distally into the lower leg. Pt has numbness on the lateral aspect of the Left leg. Weightbearing: left lower Partial weight bearing and Full weight bearing      Assistive device Straight cane  Participating in therapy (location if yes)?  no    Refills Needed: None  Order/Referral Needed: N/A

## 2022-08-25 NOTE — PATIENT INSTRUCTIONS
[id number] [facility] [address],   [address]   Phone: [phone]

## 2022-09-22 ENCOUNTER — OFFICE VISIT (OUTPATIENT)
Dept: PAIN MANAGEMENT | Age: 47
End: 2022-09-22
Payer: COMMERCIAL

## 2022-09-22 VITALS
DIASTOLIC BLOOD PRESSURE: 90 MMHG | TEMPERATURE: 97.4 F | HEART RATE: 86 BPM | WEIGHT: 125 LBS | HEIGHT: 64 IN | BODY MASS INDEX: 21.34 KG/M2 | RESPIRATION RATE: 16 BRPM | SYSTOLIC BLOOD PRESSURE: 151 MMHG | OXYGEN SATURATION: 98 %

## 2022-09-22 DIAGNOSIS — M21.70 LOWER LIMB LENGTH DIFFERENCE: ICD-10-CM

## 2022-09-22 DIAGNOSIS — M47.817 LUMBOSACRAL SPONDYLOSIS WITHOUT MYELOPATHY: ICD-10-CM

## 2022-09-22 DIAGNOSIS — F17.200 SMOKING: ICD-10-CM

## 2022-09-22 DIAGNOSIS — M51.36 DDD (DEGENERATIVE DISC DISEASE), LUMBAR: ICD-10-CM

## 2022-09-22 DIAGNOSIS — Z96.642 S/P TOTAL LEFT HIP ARTHROPLASTY: ICD-10-CM

## 2022-09-22 DIAGNOSIS — M47.817 LUMBOSACRAL SPONDYLOSIS WITHOUT MYELOPATHY: Primary | ICD-10-CM

## 2022-09-22 PROCEDURE — G8420 CALC BMI NORM PARAMETERS: HCPCS | Performed by: ANESTHESIOLOGY

## 2022-09-22 PROCEDURE — 99204 OFFICE O/P NEW MOD 45 MIN: CPT | Performed by: ANESTHESIOLOGY

## 2022-09-22 PROCEDURE — 4004F PT TOBACCO SCREEN RCVD TLK: CPT | Performed by: ANESTHESIOLOGY

## 2022-09-22 PROCEDURE — G8427 DOCREV CUR MEDS BY ELIG CLIN: HCPCS | Performed by: ANESTHESIOLOGY

## 2022-09-22 RX ORDER — NABUMETONE 750 MG/1
750 TABLET, FILM COATED ORAL 2 TIMES DAILY PRN
Qty: 40 TABLET | Refills: 1 | Status: SHIPPED | OUTPATIENT
Start: 2022-09-22

## 2022-09-22 NOTE — PROGRESS NOTES
9/22/2022    I was present for the physical exam and/or procedure of Tyrone Winter by Aniya Novoa M.D.

## 2022-09-22 NOTE — PROGRESS NOTES
[facility]        [address] [place] [address]  Dept: [phone]          Consult Note      Patient:  Earle Florian 1975    Date of Service:  09/22/22     Requesting Physician:  Debra Salgado MD    Reason for Consult:      Patient presents with complaints of low back pain, left hip pain    HISTORY OF PRESENT ILLNESS:      Ms. Lisa Escalona is a 52 y.o. female presented today to [facility] for evaluation of  chronic low back pain. H/o left intertrochanteric femur fracture in 2016 s/p surgery- cephalomedullary nail. Subsequently underwent removal and left hip arthroplasty on 2/8/2022. Satisfactory post op course. Has chronic left LE weakness/ left LE muscle atrophy and limb length discrepancy (right LE longer than the left). Is supposed to consult orthotic for correction. Has been on chronic tramadol for prn use. Has been prescribed by Dr. Tommy Rodriguez and most recently by Dr. Bettina Gibson on 8/25/2022. Off tramadol for few weeks. Left UE and LE is smaller than the right side. Alleviating factors include: rest.  Aggravating factors include: movement, walking, bending. Pain causes functional limitations/ limits Adl's : Yes    Nursing notes and details of the pain history reviewed. Please see intake notes for details. Previous treatments:   Physical Therapy : yes     Medications: - NSAID's : yes             - Membrane stabilizers : no            - Opioids : yes, tramadol             - Adjuvants or Others : yes,    Surgeries: left ROBEL    She has not been on anticoagulation medications     H/O Smoking: yes  H/O alcohol abuse : no  H/O Illicit drug use : no    Employment: disability    Imaging:     Xray left hip and left Femur: 8/25/2022:  FINDINGS:   Anatomically aligned left ROBEL with no abnormal bone or soft tissue findings   associated with the hip or  femur. Impression   Left ROBEL with no unexpected findings.        Past Medical History:   Diagnosis Date    Chronic hip pain     Urinary incontinence        Past Surgical History:   Procedure Laterality Date    COLONOSCOPY N/A 1/12/2022    COLORECTAL CANCER SCREENING, NOT HIGH RISK performed by Nia Lawson DO at [address] [facility] Left     upper femur  (approx 2017)    HIP SURGERY Left 07/09/2016    IM NAILING LEFT HIP    TOTAL HIP ARTHROPLASTY Left 2/8/2022    CONVERSION OF PRIOR HIP SURGERY TO LEFT TOTAL HIP ARTHROPLASTY ROBOTIC ASSISTED CIARA performed by Trev Levin DO at [address]         Prior to Admission medications    Medication Sig Start Date End Date Taking?  Authorizing Provider   baclofen (LIORESAL) 10 MG tablet Take 2 tablets by mouth 3 times daily 8/25/22  Yes Trev Levin DO   oxybutynin (DITROPAN-XL) 5 MG extended release tablet Take 1 tablet by mouth daily 4/21/21  Yes Rosalva Patrick MD   acetaminophen (AMINOFEN) 325 MG tablet Take 2 tablets by mouth every 4 hours as needed for Pain  Patient not taking: Reported on 9/22/2022 2/9/22   WALTER Shepherd       No Known Allergies    Social History     Socioeconomic History    Marital status:      Spouse name: audrey    Number of children: 6    Years of education: Not on file    Highest education level: Not on file   Occupational History    Not on file   Tobacco Use    Smoking status: Every Day     Packs/day: 0.50     Types: Cigarettes    Smokeless tobacco: Never   Vaping Use    Vaping Use: Never used   Substance and Sexual Activity    Alcohol use: No    Drug use: No    Sexual activity: Not on file   Other Topics Concern    Not on file   Social History Narrative    Not on file     Social Determinants of Health     Financial Resource Strain: Low Risk     Difficulty of Paying Living Expenses: Not very hard   Food Insecurity: No Food Insecurity    Worried About Running Out of Food in the Last Year: Never true    [address] in the Last Year: Never true Transportation Needs: Not on file   Physical Activity: Not on file   Stress: Not on file   Social Connections: Not on file   Intimate Partner Violence: Not on file   Housing Stability: Not on file       No family history on file. REVIEW OF SYSTEMS:     Patient specifically denies fever/chills, chest pain, shortness of breath, new bowel or bladder complaints. All other review of systems was negative. Review of Systems - documented reviewed. PHYSICAL EXAMINATION:      BP (!) 151/90   Pulse 86   Temp 97.4 °F (36.3 °C) (Infrared)   Resp 16   Ht 5' 4\" (1.626 m)   Wt 125 lb (56.7 kg)   SpO2 98%   BMI 21.46 kg/m²     General:      General appearance:  Pleasant and well-hydrated, in no distress and A & O x 3  Build:Normal Weight  Function: uses a cane to assist in ambulation    HEENT:    Head:normocephalic, atraumatic  Pupils:regular, round, equal  Sclera: icterus absent    Lungs:    Breathing:normal breathing pattern     CVS:     RRR    Abdomen:    Shape:non-distended and normal    Cervical spine:    Inspection:normal  Palpation:tenderness paravertebral muscles, tenderness trapezium, left, right -no    Thoracic spine:     Spine inspection:normal   Palpation:No tenderness over the midline and paraspinal area, bilaterally  Range of motion:normal in flexion, extension rotation bilateral and is not painful. Lumbar spine:    Spine inspection: scoliosis/ lateral bending due to limb length discrepency noted  Palpation: Tenderness paravertebral muscles Yes bilaterally  Range of motion: Decreased, flexion Decreased, Lateral bending, extension and rotation bilaterally reduced is painful. Lumbar facte tenderness + b/l  Sacroiliac joint tenderness No bilaterally    SLR : negative bilaterally    Musculoskeletal:    Trigger points no    Extremities:    Tremors:None bilaterally upper and lower  Edema:no  Left UE and left LE smaller then the right side.     Left LE shorter than right    Left Hip Rom- no

## 2022-09-22 NOTE — PROGRESS NOTES
Patient:  LEONEL Freitas 1975  Date of Service:  22      Do you currently have any of the following:    Fever: No  Headache:  No  Cough: No  Shortness of breath: No  Exposed to anyone with these symptoms: No       Patient presents with complaints of low back and left leg pain that started 5 years ago and has been getting worse. She states the pain began following Trauma    Pain is constant and is described as aching, throbbing, dull, sharp, and miserable. She rates the pain as a 9/10 on her worst day , 5/10 on her best day, and a 7/10 on average on the VAS scale. Pain does radiate to left leg. She  has weakness of the left leg. Alleviating factors include: nothing. Aggravating factors include:  movement. She states that the pain does keep her from sleeping at night. She took her last dose of  Baclofen  yesterday. She is not on NSAIDS and  is not on anticoagulation medications. Previous treatments: left leg Femur surgery. Personal Expectations from this treatment: increase activity and decrease pain    BP (!) 151/90   Pulse 86   Temp 97.4 °F (36.3 °C) (Infrared)   Resp 16   Ht 5' 4\" (1.626 m)   Wt 125 lb (56.7 kg)   SpO2 98%   BMI 21.46 kg/m²     No LMP recorded. Patient aware of her elevated blood pressure and will advise her PCP as soon as possible.

## 2022-09-23 LAB
6-MAM, QUANTITATIVE, URINE: <10
6-MONOACETYLMORPHINE, URINE: NOT DETECTED
7-AMINOCLONAZEPAM, QUANTITATIVE, URINE: <50
ALCOHOL URINE: NOT DETECTED
ALPHA-HYDROXYALPRAZOLAM, QUANTITATIVE, URINE: <50
ALPHA-HYDROXYMIDAZOLAM, QUANTITATIVE, URINE: <50
ALPHA-HYDROXYTRIAZOLAM, QUANTITATIVE, URINE: <50
ALPRAZOLAM URINE QUANT: <50
AMPHETAMINE SCREEN, URINE: NOT DETECTED
BARBITURATE SCREEN URINE: NOT DETECTED
BENZODIAZEPINE SCREEN, URINE: NOT DETECTED
BUPRENORPHINE URINE: NOT DETECTED
CANNABINOID SCREEN URINE: NOT DETECTED
CHLORDIAZEPOXIDE, QUANTITATIVE, URINE: <50
CLONAZEPAM, QUANTITATIVE, URINE: <50
COCAINE METABOLITE SCREEN URINE: NOT DETECTED
CODEINE, QUANTITATIVE, URINE: <50
COMMENT: NORMAL
DIAZEPAM URINE QUANT: <50
FENTANYL SCREEN, URINE: NOT DETECTED
FLUNITRAZEPAM, QUANTITATIVE, URINE: <50
FLURAZEPAM, QUANTITATIVE, URINE: <50
HYDROCODONE, QUANTITATIVE, URINE: <50
HYDROMORPHONE, QUANTITATIVE, URINE: <50
INTEGRITY CHECK, CREATININE, URINE: 146.9
INTEGRITY CHECK, OXIDANT, URINE: 40
INTEGRITY CHECK, PH, URINE: 6.7 (ref 4.5–9)
INTEGRITY CHECK, SPECIFIC GRAVITY, URINE: 1.02 (ref 1–1.03)
INTEGRITY CHECK, SPECIMEN INTEGRITY, URINE: NORMAL
LORAZEPAM URINE QUANT: <50
Lab: NORMAL
METHADONE SCREEN, URINE: NOT DETECTED
MIDAZOLAM URINE QUANT: <50
MORPHINE, QUANTITATIVE, URINE: <50
NORDIAZEPAM URINE QUANT: <50
NORHYDROCODONE, QUANTITATIVE, URINE: <50
NOROXYCODONE, QUANTITATIVE, URINE: <50
OPIATE SCREEN URINE: NOT DETECTED
OXAZEPAM URINE QUANT: <50
OXYCODONE URINE, QUANTITATIVE: <50
OXYCODONE URINE: NOT DETECTED
OXYMORPHONE, QUANTITATIVE, URINE: <50
PHENCYCLIDINE SCREEN URINE: NOT DETECTED
TEMAZEPAM, QUANTITATIVE, URINE: <50
TRAMADOL SCREEN URINE: NOT DETECTED

## 2023-06-15 ENCOUNTER — APPOINTMENT (OUTPATIENT)
Dept: GENERAL RADIOLOGY | Age: 48
DRG: 190 | End: 2023-06-15
Payer: COMMERCIAL

## 2023-06-15 ENCOUNTER — HOSPITAL ENCOUNTER (INPATIENT)
Age: 48
LOS: 1 days | Discharge: ANOTHER HOSPITAL | DRG: 190 | End: 2023-06-16
Attending: EMERGENCY MEDICINE | Admitting: FAMILY MEDICINE
Payer: COMMERCIAL

## 2023-06-15 ENCOUNTER — APPOINTMENT (OUTPATIENT)
Dept: CT IMAGING | Age: 48
DRG: 190 | End: 2023-06-15
Payer: COMMERCIAL

## 2023-06-15 ENCOUNTER — APPOINTMENT (OUTPATIENT)
Dept: ULTRASOUND IMAGING | Age: 48
DRG: 190 | End: 2023-06-15
Payer: COMMERCIAL

## 2023-06-15 DIAGNOSIS — I21.4 NSTEMI (NON-ST ELEVATED MYOCARDIAL INFARCTION) (HCC): Primary | ICD-10-CM

## 2023-06-15 PROBLEM — R07.9 CHEST PAIN: Status: ACTIVE | Noted: 2023-06-15

## 2023-06-15 PROBLEM — Z72.0 TOBACCO ABUSE: Status: ACTIVE | Noted: 2023-06-15

## 2023-06-15 LAB
ALBUMIN SERPL-MCNC: 3.9 G/DL (ref 3.5–5.2)
ALP SERPL-CCNC: 61 U/L (ref 35–104)
ALT SERPL-CCNC: 18 U/L (ref 0–32)
AMPHET UR QL SCN: NOT DETECTED
ANION GAP SERPL CALCULATED.3IONS-SCNC: 9 MMOL/L (ref 7–16)
ANISOCYTOSIS: ABNORMAL
APTT BLD: 31.4 SEC (ref 24.5–35.1)
APTT BLD: 31.5 SEC (ref 24.5–35.1)
APTT BLD: 54.5 SEC (ref 24.5–35.1)
AST SERPL-CCNC: 57 U/L (ref 0–31)
BARBITURATES UR QL SCN: NOT DETECTED
BASOPHILS # BLD: 0 E9/L (ref 0–0.2)
BASOPHILS NFR BLD: 0 % (ref 0–2)
BENZODIAZ UR QL SCN: NOT DETECTED
BILIRUB SERPL-MCNC: 0.2 MG/DL (ref 0–1.2)
BNP BLD-MCNC: 1101 PG/ML (ref 0–125)
BUN SERPL-MCNC: 7 MG/DL (ref 6–20)
BURR CELLS: ABNORMAL
CALCIUM SERPL-MCNC: 9.5 MG/DL (ref 8.6–10.2)
CANNABINOIDS UR QL SCN: NOT DETECTED
CHLORIDE SERPL-SCNC: 105 MMOL/L (ref 98–107)
CHOLESTEROL, TOTAL: 165 MG/DL (ref 0–199)
CO2 SERPL-SCNC: 24 MMOL/L (ref 22–29)
COCAINE UR QL SCN: NOT DETECTED
CREAT SERPL-MCNC: 0.8 MG/DL (ref 0.5–1)
D DIMER: 337 NG/ML DDU
DRUG SCREEN COMMENT UR-IMP: NORMAL
EOSINOPHIL # BLD: 0 E9/L (ref 0.05–0.5)
EOSINOPHIL NFR BLD: 0 % (ref 0–6)
ERYTHROCYTE [DISTWIDTH] IN BLOOD BY AUTOMATED COUNT: 18.6 FL (ref 11.5–15)
FENTANYL SCREEN, URINE: NOT DETECTED
GLUCOSE SERPL-MCNC: 99 MG/DL (ref 74–99)
HCG UR QL: NEGATIVE
HCG, URINE, POC: NEGATIVE
HCT VFR BLD AUTO: 32.1 % (ref 34–48)
HDLC SERPL-MCNC: 44 MG/DL
HGB BLD-MCNC: 9.7 G/DL (ref 11.5–15.5)
HYPOCHROMIA: ABNORMAL
INR BLD: 1.1
LDLC SERPL CALC-MCNC: 101 MG/DL (ref 0–99)
LYMPHOCYTES # BLD: 1.57 E9/L (ref 1.5–4)
LYMPHOCYTES NFR BLD: 28.7 % (ref 20–42)
Lab: NORMAL
MCH RBC QN AUTO: 22.8 PG (ref 26–35)
MCHC RBC AUTO-ENTMCNC: 30.2 % (ref 32–34.5)
MCV RBC AUTO: 75.4 FL (ref 80–99.9)
METHADONE UR QL SCN: NOT DETECTED
MONOCYTES # BLD: 0.22 E9/L (ref 0.1–0.95)
MONOCYTES NFR BLD: 4.3 % (ref 2–12)
NEGATIVE QC PASS/FAIL: NORMAL
NEUTROPHILS # BLD: 3.62 E9/L (ref 1.8–7.3)
NEUTS SEG NFR BLD: 67 % (ref 43–80)
NRBC BLD-RTO: 0 /100 WBC
OPIATES UR QL SCN: NOT DETECTED
OVALOCYTES: ABNORMAL
OXYCODONE URINE: NOT DETECTED
PCP UR QL SCN: NOT DETECTED
PLATELET # BLD AUTO: 421 E9/L (ref 130–450)
PMV BLD AUTO: 8.6 FL (ref 7–12)
POIKILOCYTES: ABNORMAL
POLYCHROMASIA: ABNORMAL
POSITIVE QC PASS/FAIL: NORMAL
POTASSIUM SERPL-SCNC: 4.2 MMOL/L (ref 3.5–5)
PROT SERPL-MCNC: 7.2 G/DL (ref 6.4–8.3)
PROTHROMBIN TIME: 12.9 SEC (ref 9.3–12.4)
RBC # BLD AUTO: 4.26 E12/L (ref 3.5–5.5)
SODIUM SERPL-SCNC: 138 MMOL/L (ref 132–146)
TARGET CELLS: ABNORMAL
TRIGL SERPL-MCNC: 100 MG/DL (ref 0–149)
TROPONIN, HIGH SENSITIVITY: 806 NG/L (ref 0–9)
TROPONIN, HIGH SENSITIVITY: 825 NG/L (ref 0–9)
TSH SERPL-MCNC: 1.86 UIU/ML (ref 0.27–4.2)
VLDLC SERPL CALC-MCNC: 20 MG/DL
WBC # BLD: 5.4 E9/L (ref 4.5–11.5)

## 2023-06-15 PROCEDURE — 6360000004 HC RX CONTRAST MEDICATION: Performed by: RADIOLOGY

## 2023-06-15 PROCEDURE — 80307 DRUG TEST PRSMV CHEM ANLYZR: CPT

## 2023-06-15 PROCEDURE — 6370000000 HC RX 637 (ALT 250 FOR IP): Performed by: EMERGENCY MEDICINE

## 2023-06-15 PROCEDURE — 85730 THROMBOPLASTIN TIME PARTIAL: CPT

## 2023-06-15 PROCEDURE — 81025 URINE PREGNANCY TEST: CPT

## 2023-06-15 PROCEDURE — 71275 CT ANGIOGRAPHY CHEST: CPT

## 2023-06-15 PROCEDURE — 2060000000 HC ICU INTERMEDIATE R&B

## 2023-06-15 PROCEDURE — 80061 LIPID PANEL: CPT

## 2023-06-15 PROCEDURE — 99285 EMERGENCY DEPT VISIT HI MDM: CPT

## 2023-06-15 PROCEDURE — 85025 COMPLETE CBC W/AUTO DIFF WBC: CPT

## 2023-06-15 PROCEDURE — 36415 COLL VENOUS BLD VENIPUNCTURE: CPT

## 2023-06-15 PROCEDURE — 84484 ASSAY OF TROPONIN QUANT: CPT

## 2023-06-15 PROCEDURE — 82728 ASSAY OF FERRITIN: CPT

## 2023-06-15 PROCEDURE — 99223 1ST HOSP IP/OBS HIGH 75: CPT | Performed by: INTERNAL MEDICINE

## 2023-06-15 PROCEDURE — 83550 IRON BINDING TEST: CPT

## 2023-06-15 PROCEDURE — 85610 PROTHROMBIN TIME: CPT

## 2023-06-15 PROCEDURE — 96374 THER/PROPH/DIAG INJ IV PUSH: CPT

## 2023-06-15 PROCEDURE — 6360000002 HC RX W HCPCS: Performed by: EMERGENCY MEDICINE

## 2023-06-15 PROCEDURE — 83540 ASSAY OF IRON: CPT

## 2023-06-15 PROCEDURE — 85378 FIBRIN DEGRADE SEMIQUANT: CPT

## 2023-06-15 PROCEDURE — 6370000000 HC RX 637 (ALT 250 FOR IP): Performed by: STUDENT IN AN ORGANIZED HEALTH CARE EDUCATION/TRAINING PROGRAM

## 2023-06-15 PROCEDURE — 93971 EXTREMITY STUDY: CPT

## 2023-06-15 PROCEDURE — 80053 COMPREHEN METABOLIC PANEL: CPT

## 2023-06-15 PROCEDURE — 83880 ASSAY OF NATRIURETIC PEPTIDE: CPT

## 2023-06-15 PROCEDURE — 93005 ELECTROCARDIOGRAM TRACING: CPT | Performed by: EMERGENCY MEDICINE

## 2023-06-15 PROCEDURE — 71045 X-RAY EXAM CHEST 1 VIEW: CPT

## 2023-06-15 PROCEDURE — 84443 ASSAY THYROID STIM HORMONE: CPT

## 2023-06-15 RX ORDER — SODIUM CHLORIDE 9 MG/ML
INJECTION, SOLUTION INTRAVENOUS PRN
Status: DISCONTINUED | OUTPATIENT
Start: 2023-06-15 | End: 2023-06-16 | Stop reason: HOSPADM

## 2023-06-15 RX ORDER — ASPIRIN 81 MG/1
324 TABLET, CHEWABLE ORAL ONCE
Status: COMPLETED | OUTPATIENT
Start: 2023-06-15 | End: 2023-06-15

## 2023-06-15 RX ORDER — NITROGLYCERIN 0.4 MG/1
0.4 TABLET SUBLINGUAL EVERY 5 MIN PRN
Status: DISCONTINUED | OUTPATIENT
Start: 2023-06-15 | End: 2023-06-16 | Stop reason: HOSPADM

## 2023-06-15 RX ORDER — ONDANSETRON 2 MG/ML
4 INJECTION INTRAMUSCULAR; INTRAVENOUS EVERY 6 HOURS PRN
Status: DISCONTINUED | OUTPATIENT
Start: 2023-06-15 | End: 2023-06-16 | Stop reason: HOSPADM

## 2023-06-15 RX ORDER — ACETAMINOPHEN 325 MG/1
650 TABLET ORAL EVERY 6 HOURS PRN
Status: DISCONTINUED | OUTPATIENT
Start: 2023-06-15 | End: 2023-06-16 | Stop reason: HOSPADM

## 2023-06-15 RX ORDER — ACETAMINOPHEN 650 MG/1
650 SUPPOSITORY RECTAL EVERY 6 HOURS PRN
Status: DISCONTINUED | OUTPATIENT
Start: 2023-06-15 | End: 2023-06-16 | Stop reason: HOSPADM

## 2023-06-15 RX ORDER — POLYETHYLENE GLYCOL 3350 17 G/17G
17 POWDER, FOR SOLUTION ORAL DAILY PRN
Status: DISCONTINUED | OUTPATIENT
Start: 2023-06-15 | End: 2023-06-16 | Stop reason: HOSPADM

## 2023-06-15 RX ORDER — HEPARIN SODIUM 1000 [USP'U]/ML
60 INJECTION, SOLUTION INTRAVENOUS; SUBCUTANEOUS ONCE
Status: COMPLETED | OUTPATIENT
Start: 2023-06-15 | End: 2023-06-15

## 2023-06-15 RX ORDER — ATORVASTATIN CALCIUM 40 MG/1
40 TABLET, FILM COATED ORAL NIGHTLY
Status: DISCONTINUED | OUTPATIENT
Start: 2023-06-15 | End: 2023-06-16 | Stop reason: HOSPADM

## 2023-06-15 RX ORDER — SODIUM CHLORIDE 9 MG/ML
INJECTION, SOLUTION INTRAVENOUS CONTINUOUS
Status: DISCONTINUED | OUTPATIENT
Start: 2023-06-16 | End: 2023-06-16 | Stop reason: HOSPADM

## 2023-06-15 RX ORDER — NICOTINE 21 MG/24HR
1 PATCH, TRANSDERMAL 24 HOURS TRANSDERMAL DAILY
Status: DISCONTINUED | OUTPATIENT
Start: 2023-06-15 | End: 2023-06-16 | Stop reason: HOSPADM

## 2023-06-15 RX ORDER — HEPARIN SODIUM 1000 [USP'U]/ML
30 INJECTION, SOLUTION INTRAVENOUS; SUBCUTANEOUS PRN
Status: DISCONTINUED | OUTPATIENT
Start: 2023-06-15 | End: 2023-06-16 | Stop reason: HOSPADM

## 2023-06-15 RX ORDER — SODIUM CHLORIDE 0.9 % (FLUSH) 0.9 %
5-40 SYRINGE (ML) INJECTION PRN
Status: DISCONTINUED | OUTPATIENT
Start: 2023-06-15 | End: 2023-06-16 | Stop reason: HOSPADM

## 2023-06-15 RX ORDER — ASPIRIN 81 MG/1
81 TABLET, CHEWABLE ORAL DAILY
Status: DISCONTINUED | OUTPATIENT
Start: 2023-06-16 | End: 2023-06-16 | Stop reason: HOSPADM

## 2023-06-15 RX ORDER — ONDANSETRON 4 MG/1
4 TABLET, ORALLY DISINTEGRATING ORAL EVERY 8 HOURS PRN
Status: DISCONTINUED | OUTPATIENT
Start: 2023-06-15 | End: 2023-06-16 | Stop reason: HOSPADM

## 2023-06-15 RX ORDER — SODIUM CHLORIDE 0.9 % (FLUSH) 0.9 %
5-40 SYRINGE (ML) INJECTION EVERY 12 HOURS SCHEDULED
Status: DISCONTINUED | OUTPATIENT
Start: 2023-06-15 | End: 2023-06-16 | Stop reason: HOSPADM

## 2023-06-15 RX ORDER — HEPARIN SODIUM 1000 [USP'U]/ML
60 INJECTION, SOLUTION INTRAVENOUS; SUBCUTANEOUS PRN
Status: DISCONTINUED | OUTPATIENT
Start: 2023-06-15 | End: 2023-06-16 | Stop reason: HOSPADM

## 2023-06-15 RX ORDER — HEPARIN SODIUM 10000 [USP'U]/100ML
5-30 INJECTION, SOLUTION INTRAVENOUS CONTINUOUS
Status: DISCONTINUED | OUTPATIENT
Start: 2023-06-15 | End: 2023-06-16 | Stop reason: HOSPADM

## 2023-06-15 RX ADMIN — HEPARIN SODIUM 12 UNITS/KG/HR: 10000 INJECTION, SOLUTION INTRAVENOUS at 15:27

## 2023-06-15 RX ADMIN — IOPAMIDOL 75 ML: 755 INJECTION, SOLUTION INTRAVENOUS at 13:26

## 2023-06-15 RX ADMIN — ASPIRIN 81 MG CHEWABLE TABLET 324 MG: 81 TABLET CHEWABLE at 11:41

## 2023-06-15 RX ADMIN — ATORVASTATIN CALCIUM 40 MG: 40 TABLET, FILM COATED ORAL at 19:47

## 2023-06-15 RX ADMIN — METOPROLOL TARTRATE 25 MG: 25 TABLET, FILM COATED ORAL at 19:47

## 2023-06-15 RX ADMIN — HEPARIN SODIUM 3450 UNITS: 1000 INJECTION INTRAVENOUS; SUBCUTANEOUS at 15:26

## 2023-06-15 ASSESSMENT — ENCOUNTER SYMPTOMS
VOMITING: 0
WHEEZING: 0
SHORTNESS OF BREATH: 0
NAUSEA: 0
SINUS PAIN: 0
SORE THROAT: 0
ABDOMINAL PAIN: 0
EYE PAIN: 0
COUGH: 0

## 2023-06-15 ASSESSMENT — PAIN - FUNCTIONAL ASSESSMENT
PAIN_FUNCTIONAL_ASSESSMENT: NONE - DENIES PAIN

## 2023-06-15 ASSESSMENT — LIFESTYLE VARIABLES
HOW OFTEN DO YOU HAVE A DRINK CONTAINING ALCOHOL: NEVER
HOW MANY STANDARD DRINKS CONTAINING ALCOHOL DO YOU HAVE ON A TYPICAL DAY: PATIENT DOES NOT DRINK

## 2023-06-15 NOTE — ED NOTES
ED to Inpatient Handoff Report    Cath Lab at [facility] notified this nurse that patient is scheduled to be in the cath lab 6/16/2023 @ 0630. Floor nurse to arrange transportation    Notified ned that electronic handoff available and patient ready for transport to room 435. Safety Risks: None identified    Patient in Restraints: no    Constant Observer or Patient : no    Telemetry Monitoring Ordered :Yes           Order to transfer to unit without monitor:NO    Last MEWS: 1 Time completed: 1737    Vitals:    06/15/23 1553 06/15/23 1600 06/15/23 1700 06/15/23 1737   BP: (!) 160/107 (!) 142/93 (!) 161/104 (!) 159/96   Pulse: 69 65 69 66   Resp: 19 16 17 17   Temp:   98 °F (36.7 °C) 98.2 °F (36.8 °C)   TempSrc:   Oral Oral   SpO2: 100% 99% 100% 98%   Weight: 126 lb (57.2 kg)      Height: 5' 4\" (1.626 m)          Opportunity for questions and clarification was provided.         Paxton Vieira RN  06/15/23 1200 [facility]  06/15/23 1200 [facility]  06/15/23 5826 Additional Progress Note...

## 2023-06-15 NOTE — ED PROVIDER NOTES
tablet 81 mg (has no administration in time range)   atorvastatin (LIPITOR) tablet 40 mg (40 mg Oral Given 6/15/23 1947)   sodium chloride flush 0.9 % injection 5-40 mL (5 mLs IntraVENous Not Given 6/15/23 1950)   sodium chloride flush 0.9 % injection 5-40 mL (has no administration in time range)   0.9 % sodium chloride infusion (has no administration in time range)   ondansetron (ZOFRAN-ODT) disintegrating tablet 4 mg (has no administration in time range)     Or   ondansetron (ZOFRAN) injection 4 mg (has no administration in time range)   polyethylene glycol (GLYCOLAX) packet 17 g (has no administration in time range)   acetaminophen (TYLENOL) tablet 650 mg (has no administration in time range)     Or   acetaminophen (TYLENOL) suppository 650 mg (has no administration in time range)   nicotine (NICODERM CQ) 21 MG/24HR 1 patch (1 patch TransDERmal Patch Applied 6/15/23 1809)   nitroGLYCERIN (NITROSTAT) SL tablet 0.4 mg (has no administration in time range)   aspirin chewable tablet 324 mg (324 mg Oral Given 6/15/23 1141)   iopamidol (ISOVUE-370) 76 % injection 75 mL (75 mLs IntraVENous Given 6/15/23 1326)   heparin (porcine) injection 3,450 Units (3,450 Units IntraVENous Given 6/15/23 1526)       Medical Decision Making/ED COURSE:    History From: patient     Patient is a 50 y.o. female presenting to the ED for acute onset  chest pain, moderate in severity. In the ED, patient was hemodynamically stable and afebrile. On exam, patient was nontoxic, chest pain-free, no focal neurologic deficits on examination. Patient was placed on the cardiac monitor. I interpreted findings. Rhythm -sinus. Labs and chest x-ray obtained. Differential diagnosis includes acute coronary syndrome, pulmonary embolus, musculoskeletal chest pain. Patient administered oral aspirin. I reviewed and interpreted labs. CBC reassuring with no leukocytosis and stable anemia when compared to prior labs.   CMP within normal limits with normal

## 2023-06-15 NOTE — CONSULTS
respiratory effort is noted with no accessory muscle usage present. Lung fields are clear to ascultation. Cardiac examination is notable for a regular rate and rhythm with no palpable thrill. No gallop rhythm or cardiac murmur are identified. A benign abdominal examination is present with no masses or organomegaly. A normal abdominal aortic pulsation is present. Intact pulses are present throughout all extremities and no peripheral edema is present. No focal neurologic deficits are present. Intake/Output:  No intake or output data in the 24 hours ending 06/15/23 1526  No intake/output data recorded. Laboratory Tests:  Lab Results   Component Value Date    CREATININE 0.8 06/15/2023    BUN 7 06/15/2023     06/15/2023    K 4.2 06/15/2023     06/15/2023    CO2 24 06/15/2023     No results for input(s): CKTOTAL, CKMB in the last 72 hours.     Invalid input(s): TROPONONI  No results found for: BNP  Lab Results   Component Value Date/Time    WBC 5.4 06/15/2023 11:29 AM    RBC 4.26 06/15/2023 11:29 AM    HGB 9.7 06/15/2023 11:29 AM    HCT 32.1 06/15/2023 11:29 AM    MCV 75.4 06/15/2023 11:29 AM    MCH 22.8 06/15/2023 11:29 AM    MCHC 30.2 06/15/2023 11:29 AM    RDW 18.6 06/15/2023 11:29 AM     06/15/2023 11:29 AM    MPV 8.6 06/15/2023 11:29 AM     Recent Labs     06/15/23  1129   ALKPHOS 61   ALT 18   AST 57*   PROT 7.2   BILITOT 0.2   LABALBU 3.9     No results found for: MG  Lab Results   Component Value Date/Time    PROTIME 12.9 06/15/2023 11:29 AM    INR 1.1 06/15/2023 11:29 AM     No results found for: TSH  No components found for: CHLPL  Lab Results   Component Value Date    TRIG 86 11/10/2021     Lab Results   Component Value Date    HDL 43 11/10/2021     Lab Results   Component Value Date    LDLCALC 127 (H) 11/10/2021         Cardiac Tests:  ECG: A resting electrocardiogram reviewed at the time of evaluation emonstrates evidence of sinus rhythm with diffuse anterolateral T wave

## 2023-06-15 NOTE — H&P
06/03/2023 (Approximate)   SpO2 100%   BMI 21.63 kg/m²     Physical Exam  Vitals and nursing note reviewed. Constitutional:       General: She is not in acute distress. Appearance: Normal appearance. She is not ill-appearing. HENT:      Head: Normocephalic and atraumatic. Right Ear: External ear normal.      Left Ear: External ear normal.      Nose: Nose normal. No congestion. Mouth/Throat:      Mouth: Mucous membranes are moist.      Pharynx: Oropharynx is clear. Comments: No teeth present on top  Eyes:      General:         Right eye: No discharge. Left eye: No discharge. Conjunctiva/sclera: Conjunctivae normal.   Cardiovascular:      Rate and Rhythm: Normal rate and regular rhythm. Heart sounds: No murmur heard. No friction rub. Pulmonary:      Effort: No respiratory distress. Chest:      Chest wall: No tenderness. Abdominal:      General: Abdomen is flat. Palpations: Abdomen is soft. There is no mass. Tenderness: There is no abdominal tenderness. Musculoskeletal:      Cervical back: Neck supple. No tenderness. Right lower leg: No edema. Left lower leg: No edema. Lymphadenopathy:      Cervical: No cervical adenopathy. Skin:     Findings: No erythema or rash. Neurological:      General: No focal deficit present. Mental Status: She is alert. Mental status is at baseline. Psychiatric:         Mood and Affect: Mood normal.         Thought Content:  Thought content normal.       LABS:  Recent Results (from the past 24 hour(s))   POC Pregnancy Urine Qual    Collection Time: 06/15/23 12:00 AM   Result Value Ref Range    HCG, Urine, POC Negative Negative    Lot Number [id number]     Positive QC Pass/Fail Acceptable     Negative QC Pass/Fail Acceptable    EKG 12 Lead    Collection Time: 06/15/23 11:02 AM   Result Value Ref Range    Ventricular Rate 80 BPM    Atrial Rate 80 BPM    P-R Interval 158 ms    QRS Duration 82 ms    Q-T Interval 420

## 2023-06-15 NOTE — ED NOTES
Cath Lab at [facility] notified this nurse that patient is scheduled to be in the cath lab 6/16/2023 @ 0630.  Floor nurse to arrange transportation     Tracy Flores [facility]  06/15/23 1352

## 2023-06-15 NOTE — PROGRESS NOTES
Serena w/ Brittaney Jeter w/ [facility]. Arranged transport for patient to be transported downtown to cath lab at [facility] for 0630 procedure. Patient notified of  time.

## 2023-06-16 ENCOUNTER — HOSPITAL ENCOUNTER (OUTPATIENT)
Dept: CARDIAC CATH/INVASIVE PROCEDURES | Age: 48
Discharge: HOME | End: 2023-06-16
Attending: INTERNAL MEDICINE | Admitting: INTERNAL MEDICINE
Payer: COMMERCIAL

## 2023-06-16 VITALS
OXYGEN SATURATION: 99 % | HEART RATE: 75 BPM | DIASTOLIC BLOOD PRESSURE: 95 MMHG | HEIGHT: 64 IN | SYSTOLIC BLOOD PRESSURE: 156 MMHG | WEIGHT: 127.87 LBS | BODY MASS INDEX: 21.83 KG/M2 | RESPIRATION RATE: 18 BRPM | TEMPERATURE: 97.8 F

## 2023-06-16 VITALS
HEART RATE: 64 BPM | DIASTOLIC BLOOD PRESSURE: 92 MMHG | SYSTOLIC BLOOD PRESSURE: 136 MMHG | RESPIRATION RATE: 18 BRPM | BODY MASS INDEX: 21.68 KG/M2 | HEIGHT: 64 IN | TEMPERATURE: 98.1 F | OXYGEN SATURATION: 98 % | WEIGHT: 127 LBS

## 2023-06-16 PROBLEM — Z98.890 S/P CARDIAC CATH: Status: ACTIVE | Noted: 2023-06-16

## 2023-06-16 LAB
ABO + RH BLD: NORMAL
ANION GAP SERPL CALCULATED.3IONS-SCNC: 8 MMOL/L (ref 7–16)
ANISOCYTOSIS: ABNORMAL
APTT BLD: 50.5 SEC (ref 24.5–35.1)
BASOPHILS # BLD: 0.05 E9/L (ref 0–0.2)
BASOPHILS NFR BLD: 0.9 % (ref 0–2)
BLD GP AB SCN SERPL QL: NORMAL
BUN SERPL-MCNC: 8 MG/DL (ref 6–20)
CALCIUM SERPL-MCNC: 9.2 MG/DL (ref 8.6–10.2)
CHLORIDE SERPL-SCNC: 107 MMOL/L (ref 98–107)
CO2 SERPL-SCNC: 23 MMOL/L (ref 22–29)
CREAT SERPL-MCNC: 0.8 MG/DL (ref 0.5–1)
EKG ATRIAL RATE: 80 BPM
EKG P AXIS: 67 DEGREES
EKG P-R INTERVAL: 158 MS
EKG Q-T INTERVAL: 420 MS
EKG QRS DURATION: 82 MS
EKG QTC CALCULATION (BAZETT): 484 MS
EKG R AXIS: 1 DEGREES
EKG T AXIS: 127 DEGREES
EKG VENTRICULAR RATE: 80 BPM
EOSINOPHIL # BLD: 0.05 E9/L (ref 0.05–0.5)
EOSINOPHIL NFR BLD: 0.9 % (ref 0–6)
ERYTHROCYTE [DISTWIDTH] IN BLOOD BY AUTOMATED COUNT: 18.6 FL (ref 11.5–15)
FERRITIN SERPL-MCNC: 7 NG/ML
GLUCOSE SERPL-MCNC: 103 MG/DL (ref 74–99)
HCT VFR BLD AUTO: 29.6 % (ref 34–48)
HGB BLD-MCNC: 9 G/DL (ref 11.5–15.5)
HYPOCHROMIA: ABNORMAL
IRON SATN MFR SERPL: 5 % (ref 15–50)
IRON SERPL-MCNC: 18 MCG/DL (ref 37–145)
LYMPHOCYTES # BLD: 2.13 E9/L (ref 1.5–4)
LYMPHOCYTES NFR BLD: 35.6 % (ref 20–42)
MCH RBC QN AUTO: 23 PG (ref 26–35)
MCHC RBC AUTO-ENTMCNC: 30.4 % (ref 32–34.5)
MCV RBC AUTO: 75.5 FL (ref 80–99.9)
MONOCYTES # BLD: 0.56 E9/L (ref 0.1–0.95)
MONOCYTES NFR BLD: 9.6 % (ref 2–12)
NEUTROPHILS # BLD: 2.8 E9/L (ref 1.8–7.3)
NEUTS SEG NFR BLD: 50.4 % (ref 43–80)
NRBC BLD-RTO: 0 /100 WBC
OVALOCYTES: ABNORMAL
PLATELET # BLD AUTO: 406 E9/L (ref 130–450)
PMV BLD AUTO: 9.3 FL (ref 7–12)
POC ACT LR: 155 SECONDS
POIKILOCYTES: ABNORMAL
POLYCHROMASIA: ABNORMAL
POTASSIUM SERPL-SCNC: 3.6 MMOL/L (ref 3.5–5)
RBC # BLD AUTO: 3.92 E12/L (ref 3.5–5.5)
SCHISTOCYTES: ABNORMAL
SODIUM SERPL-SCNC: 138 MMOL/L (ref 132–146)
TARGET CELLS: ABNORMAL
TIBC SERPL-MCNC: 335 MCG/DL (ref 250–450)
VARIANT LYMPHS NFR BLD: 2.6 % (ref 0–4)
WBC # BLD: 5.6 E9/L (ref 4.5–11.5)

## 2023-06-16 PROCEDURE — 36415 COLL VENOUS BLD VENIPUNCTURE: CPT

## 2023-06-16 PROCEDURE — 85025 COMPLETE CBC W/AUTO DIFF WBC: CPT

## 2023-06-16 PROCEDURE — C1894 INTRO/SHEATH, NON-LASER: HCPCS

## 2023-06-16 PROCEDURE — 86900 BLOOD TYPING SEROLOGIC ABO: CPT

## 2023-06-16 PROCEDURE — 93458 L HRT ARTERY/VENTRICLE ANGIO: CPT

## 2023-06-16 PROCEDURE — 85347 COAGULATION TIME ACTIVATED: CPT

## 2023-06-16 PROCEDURE — 2580000003 HC RX 258: Performed by: INTERNAL MEDICINE

## 2023-06-16 PROCEDURE — 86901 BLOOD TYPING SEROLOGIC RH(D): CPT

## 2023-06-16 PROCEDURE — 2709999900 HC NON-CHARGEABLE SUPPLY

## 2023-06-16 PROCEDURE — 6360000002 HC RX W HCPCS

## 2023-06-16 PROCEDURE — 93010 ELECTROCARDIOGRAM REPORT: CPT | Performed by: INTERNAL MEDICINE

## 2023-06-16 PROCEDURE — 86850 RBC ANTIBODY SCREEN: CPT

## 2023-06-16 PROCEDURE — 93458 L HRT ARTERY/VENTRICLE ANGIO: CPT | Performed by: INTERNAL MEDICINE

## 2023-06-16 PROCEDURE — C1769 GUIDE WIRE: HCPCS

## 2023-06-16 PROCEDURE — 85730 THROMBOPLASTIN TIME PARTIAL: CPT

## 2023-06-16 PROCEDURE — 80048 BASIC METABOLIC PNL TOTAL CA: CPT

## 2023-06-16 PROCEDURE — 6370000000 HC RX 637 (ALT 250 FOR IP): Performed by: PHYSICIAN ASSISTANT

## 2023-06-16 PROCEDURE — 2500000003 HC RX 250 WO HCPCS

## 2023-06-16 RX ORDER — SODIUM CHLORIDE 0.9 % (FLUSH) 0.9 %
5-40 SYRINGE (ML) INJECTION PRN
Status: DISCONTINUED | OUTPATIENT
Start: 2023-06-16 | End: 2023-06-16 | Stop reason: HOSPADM

## 2023-06-16 RX ORDER — SODIUM CHLORIDE 0.9 % (FLUSH) 0.9 %
5-40 SYRINGE (ML) INJECTION EVERY 12 HOURS SCHEDULED
Status: DISCONTINUED | OUTPATIENT
Start: 2023-06-16 | End: 2023-06-16 | Stop reason: HOSPADM

## 2023-06-16 RX ORDER — ASPIRIN 81 MG/1
81 TABLET, CHEWABLE ORAL DAILY
Qty: 30 TABLET | Refills: 3 | Status: SHIPPED | OUTPATIENT
Start: 2023-06-17

## 2023-06-16 RX ORDER — ATORVASTATIN CALCIUM 40 MG/1
40 TABLET, FILM COATED ORAL NIGHTLY
Status: DISCONTINUED | OUTPATIENT
Start: 2023-06-16 | End: 2023-06-16 | Stop reason: HOSPADM

## 2023-06-16 RX ORDER — ASPIRIN 81 MG/1
81 TABLET, CHEWABLE ORAL DAILY
Status: DISCONTINUED | OUTPATIENT
Start: 2023-06-16 | End: 2023-06-16 | Stop reason: HOSPADM

## 2023-06-16 RX ORDER — NICOTINE 21 MG/24HR
1 PATCH, TRANSDERMAL 24 HOURS TRANSDERMAL DAILY
Qty: 30 PATCH | Refills: 0 | Status: SHIPPED | OUTPATIENT
Start: 2023-06-16 | End: 2023-07-16

## 2023-06-16 RX ORDER — SPIRONOLACTONE 25 MG/1
25 TABLET ORAL DAILY
Status: DISCONTINUED | OUTPATIENT
Start: 2023-06-16 | End: 2023-06-16 | Stop reason: HOSPADM

## 2023-06-16 RX ORDER — SODIUM CHLORIDE 9 MG/ML
INJECTION, SOLUTION INTRAVENOUS PRN
Status: DISCONTINUED | OUTPATIENT
Start: 2023-06-16 | End: 2023-06-16 | Stop reason: HOSPADM

## 2023-06-16 RX ORDER — METOPROLOL SUCCINATE 25 MG/1
25 TABLET, EXTENDED RELEASE ORAL DAILY
Status: DISCONTINUED | OUTPATIENT
Start: 2023-06-16 | End: 2023-06-16 | Stop reason: HOSPADM

## 2023-06-16 RX ORDER — ATORVASTATIN CALCIUM 40 MG/1
40 TABLET, FILM COATED ORAL NIGHTLY
Qty: 30 TABLET | Refills: 3 | Status: SHIPPED | OUTPATIENT
Start: 2023-06-16

## 2023-06-16 RX ORDER — METOPROLOL SUCCINATE 25 MG/1
25 TABLET, EXTENDED RELEASE ORAL DAILY
Qty: 30 TABLET | Refills: 3 | Status: SHIPPED | OUTPATIENT
Start: 2023-06-17

## 2023-06-16 RX ORDER — ACETAMINOPHEN 325 MG/1
650 TABLET ORAL EVERY 4 HOURS PRN
Status: DISCONTINUED | OUTPATIENT
Start: 2023-06-16 | End: 2023-06-16 | Stop reason: HOSPADM

## 2023-06-16 RX ORDER — SPIRONOLACTONE 25 MG/1
25 TABLET ORAL DAILY
Qty: 30 TABLET | Refills: 3 | Status: SHIPPED | OUTPATIENT
Start: 2023-06-17

## 2023-06-16 RX ORDER — SODIUM CHLORIDE 9 MG/ML
INJECTION, SOLUTION INTRAVENOUS CONTINUOUS
Status: ACTIVE | OUTPATIENT
Start: 2023-06-16 | End: 2023-06-16

## 2023-06-16 RX ADMIN — SODIUM CHLORIDE: 9 INJECTION, SOLUTION INTRAVENOUS at 10:54

## 2023-06-16 RX ADMIN — SACUBITRIL AND VALSARTAN 1 TABLET: 24; 26 TABLET, FILM COATED ORAL at 12:20

## 2023-06-16 RX ADMIN — SPIRONOLACTONE 25 MG: 25 TABLET ORAL at 12:57

## 2023-06-16 RX ADMIN — METOPROLOL SUCCINATE 25 MG: 25 TABLET, EXTENDED RELEASE ORAL at 12:20

## 2023-06-16 RX ADMIN — ASPIRIN 81 MG: 81 TABLET, CHEWABLE ORAL at 12:20

## 2023-06-16 NOTE — PROCEDURES
[address]                  [address] [id number],  [facility]                            CARDIAC CATHETERIZATION    PATIENT NAME: April Ricketts                   :        1975  MED REC NO:   [id number]                            ROOM:       6318  ACCOUNT NO:   [de-identified]                           ADMIT DATE: 2023  PROVIDER:     Romina Sands MD    DATE OF PROCEDURE:  2023    LEFT HEART CATHETERIZATION    REFERRING PHYSICIAN:  Nikita Canseco MD    INDICATION:  Non-STEMI. AUC indication 3 and score 9. PROCEDURE NOTE:  The patient was transferred from [facility] to  undergo left heart catheterization. After obtaining a signed consent  from the patient, she was brought to the cardiac cath lab in usual  fasting state. Under sterile condition and under local anesthetic and  using conscious sedation, a 6-Bahamian Terumo Slender sheath was inserted  into her right radial artery. The patient's ACT was 155. She received  5000 units of intravenous heparin. She also received 200 mcg of  intraarterial nitroglycerin and 2.5 mg of diluted verapamil via the  right radial sheath. Then, a 5-Bahamian TIG diagnostic catheter was  advanced over a J-tip wire to the ascending aorta without difficulty. The left main coronary artery was engaged and a coronary angiogram was  done. Then, the right coronary artery was engaged and a coronary  angiogram was done. This catheter was exchanged over a guidewire to a  5-Bahamian pigtail which was advanced into the left ventricle without  difficulty. The left ventricular end-diastolic pressure was measured. Left ventriculogram was done. There was no significant gradient across  the aortic valve. At the end of the procedure, the pigtail was pulled  out. The Terumo Slender sheath was pulled out and a Vasc Band was  applied over the right radial artery with good hemostasis.   The patient  tolerated the

## 2023-06-16 NOTE — PROGRESS NOTES
Cath lab at [facility] notified that PAS unable to  patient until 0700.   RADHA Perez  6:29 AM

## 2023-06-16 NOTE — PROGRESS NOTES
CLINICAL PHARMACY NOTE: MEDS TO BEDS    Total # of Prescriptions Filled: 5   The following medications were delivered to the patient:  Aspirin low dose chew  Spironolactone 25 mg  Entresto 24-26 mg  Metoprolol ER 25 mg  Atorvastatin 40 mg    Additional Documentation:     Picked up in the pharmacy

## 2023-06-16 NOTE — PROGRESS NOTES
[address]  Progress Note    Chief complaint :  Chief Complaint   Patient presents with    Other     Pt sent in by PCP for abnormal/ EKG changes       Subjective:    No overnight problems. Patient describes feeling better. She stated that she can mow her lawn and can do heavy physical activity but had not experienced any chest pain after that ever. But she also mentioned that when she had those 2 chest pain events, she might be going through emotional stuff. Patient denies chest pain, SOB, nausea, vomiting, bloody stools, fever, chills, changes in urination, changes in BM. Patient is tolerating diet. and Patient is NPO. Past medical, surgical, family and social history were reviewed, non-contributory, and unchanged unless otherwise stated. ROS is negative except as above. Objective:  BP (!) 135/94   Pulse 71   Temp 98.4 °F (36.9 °C) (Oral)   Resp 18   Ht 5' 4\" (1.626 m)   Wt 127 lb 13.9 oz (58 kg)   LMP 06/03/2023 (Approximate)   SpO2 98%   BMI 21.95 kg/m²     Physical Exam  Constitutional:       General: She is not in acute distress. Appearance: Normal appearance. HENT:      Head: Normocephalic and atraumatic. Mouth/Throat:      Mouth: Mucous membranes are moist.      Pharynx: Oropharynx is clear. Eyes:      Extraocular Movements: Extraocular movements intact. Conjunctiva/sclera: Conjunctivae normal.   Cardiovascular:      Rate and Rhythm: Normal rate and regular rhythm. Pulses: Normal pulses. Heart sounds: Normal heart sounds. No friction rub. No gallop. Pulmonary:      Effort: Pulmonary effort is normal.      Breath sounds: Normal breath sounds. No wheezing. Abdominal:      General: There is no distension. Musculoskeletal:      Cervical back: Normal range of motion. Right lower leg: No edema. Left lower leg: No edema. Skin:     General: Skin is warm and dry.    Neurological:      General: No focal deficit

## 2023-06-16 NOTE — DISCHARGE SUMMARY
ALKPHOS 61 06/15/2023 11:29 AM    AST 57 06/15/2023 11:29 AM    ALT 18 06/15/2023 11:29 AM       Discharge Exam:   VITALS: BP (!) 156/95   Pulse 75   Temp 97.8 °F (36.6 °C) (Temporal)   Resp 18   Ht 5' 4\" (1.626 m)   Wt 127 lb 13.9 oz (58 kg)   LMP 06/03/2023 (Approximate)   SpO2 99%   BMI 21.95 kg/m²     Physical Exam  Constitutional:       General: She is not in acute distress. Appearance: Normal appearance. HENT:      Head: Normocephalic and atraumatic. Mouth/Throat:      Mouth: Mucous membranes are moist.      Pharynx: Oropharynx is clear. Eyes:      Extraocular Movements: Extraocular movements intact. Conjunctiva/sclera: Conjunctivae normal.   Cardiovascular:      Rate and Rhythm: Normal rate and regular rhythm. Pulses: Normal pulses. Heart sounds: Normal heart sounds. No murmur heard. Pulmonary:      Effort: Pulmonary effort is normal.      Breath sounds: Normal breath sounds. No wheezing. Musculoskeletal:      Cervical back: Normal range of motion. Right lower leg: No edema. Left lower leg: No edema. Skin:     General: Skin is warm and dry. Neurological:      General: No focal deficit present. Mental Status: She is alert and oriented to person, place, and time. Psychiatric:         Attention and Perception: Attention normal.         Mood and Affect: Mood normal.          Post Discharge Follow Up Issues:  -Follow up with cardiologist.   -Probable cardiac rehab   Disposition: [facility]. Condition: stable    Patient Instructions:      Medication List      You have not been prescribed any medications. Activity: activity as tolerated  Diet: regular diet and cardiac diet    Discharge instructions:   FU with cardiologist and PCP.        Signed:  Neetu Hawkins MD PGY-1  6/16/2023  11:42 AM

## 2023-06-16 NOTE — PROGRESS NOTES
Extended Stay Recovery Discharge Documentation    Final procedure site check completed, stable for discharge- Yes  Ambulated without issue post recovery completion, gait steady. Peripheral IV sites removed (see IV Flowsheet) and site asymptomatic- Yes  Patient dressed self without assistance. Discharge instructions reviewed with Patient and verbalized understanding.    Patient discharged Home with daughter at [facility]  Monitor cleaned and placed back in nurses' station- Yes

## 2023-06-16 NOTE — PROGRESS NOTES
[facility] to push back time of  to 7 am due to not having an ambulance that can transport a heparin gtt.

## 2023-06-20 ENCOUNTER — TELEPHONE (OUTPATIENT)
Dept: ADMINISTRATIVE | Age: 48
End: 2023-06-20

## 2023-06-22 ENCOUNTER — TELEPHONE (OUTPATIENT)
Dept: SURGERY | Age: 48
End: 2023-06-22

## 2023-06-22 ENCOUNTER — OFFICE VISIT (OUTPATIENT)
Dept: SURGERY | Age: 48
End: 2023-06-22
Payer: COMMERCIAL

## 2023-06-22 VITALS
WEIGHT: 124 LBS | SYSTOLIC BLOOD PRESSURE: 127 MMHG | DIASTOLIC BLOOD PRESSURE: 81 MMHG | TEMPERATURE: 97.2 F | HEIGHT: 64 IN | HEART RATE: 82 BPM | OXYGEN SATURATION: 98 % | BODY MASS INDEX: 21.17 KG/M2

## 2023-06-22 DIAGNOSIS — K59.00 CONSTIPATION, UNSPECIFIED CONSTIPATION TYPE: Primary | ICD-10-CM

## 2023-06-22 PROBLEM — Z80.0 FAMILY HISTORY OF MALIGNANT NEOPLASM OF GASTROINTESTINAL TRACT: Status: ACTIVE | Noted: 2023-06-22

## 2023-06-22 PROBLEM — Z12.11 SPECIAL SCREENING FOR MALIGNANT NEOPLASMS, COLON: Status: ACTIVE | Noted: 2023-06-22

## 2023-06-22 PROCEDURE — 1111F DSCHRG MED/CURRENT MED MERGE: CPT | Performed by: SURGERY

## 2023-06-22 PROCEDURE — 4004F PT TOBACCO SCREEN RCVD TLK: CPT | Performed by: SURGERY

## 2023-06-22 PROCEDURE — 99213 OFFICE O/P EST LOW 20 MIN: CPT | Performed by: SURGERY

## 2023-06-22 PROCEDURE — G8427 DOCREV CUR MEDS BY ELIG CLIN: HCPCS | Performed by: SURGERY

## 2023-06-22 PROCEDURE — G8420 CALC BMI NORM PARAMETERS: HCPCS | Performed by: SURGERY

## 2023-06-22 RX ORDER — SODIUM CHLORIDE 9 MG/ML
INJECTION, SOLUTION INTRAVENOUS CONTINUOUS
OUTPATIENT
Start: 2023-06-22

## 2023-06-22 RX ORDER — POLYETHYLENE GLYCOL 3350, SODIUM SULFATE ANHYDROUS, SODIUM BICARBONATE, SODIUM CHLORIDE, POTASSIUM CHLORIDE 236; 22.74; 6.74; 5.86; 2.97 G/4L; G/4L; G/4L; G/4L; G/4L
4 POWDER, FOR SOLUTION ORAL ONCE
Qty: 4000 ML | Refills: 0 | Status: SHIPPED | OUTPATIENT
Start: 2023-06-22 | End: 2023-06-22

## 2023-06-22 NOTE — PATIENT INSTRUCTIONS
General Surgery     Preoperative Instructions    Please read the following information very carefully. It contains information that is necessary to best prepare you for your upcoming procedure. Make arrangements for a  to take you to and from your procedure. Nothing to eat or drink after midnight the night before your procedure. Follow your bowel prep instructions if you have them for this procedure. 3 days prior to your procedure: Stop taking blood thinners like Coumadin or Plavix or Xarelto. 5 days prior to your procedure: Stop taking Aspirin or Aspirin containing products. If you cannot stop any of these medications prior to your procedure, please contact our office. Medications morning of procedure: Only heart, breathing, blood pressure, and seizure medications are permitted on the morning of your procedure. These medications can be taken with a sip of water. IF YOU ARE UNABLE TO KEEP THE ABOVE SCHEDULED PROCEDURE, YOU MUST NOTIFY DR. DIXON'S OFFICE [phone]. NOT THE FACILITY. NO CHEWING GUM OR CHEWING TOBACCO AFTER MIDNIGHT ON DAY OF PROCEDURE.    YOU MUST HAVE TRANSPORTATION TO AND FROM THE FACILITY.

## 2023-06-22 NOTE — TELEPHONE ENCOUNTER
Prior Authorization Form:      DEMOGRAPHICS:                     Patient Name:  Etienne Paulino  Patient :  1975            Insurance:  Payor: Arta Kanner / Plan: Sulema Peoples / Product Type: *No Product type* /   Insurance ID Number:    Payer/Plan Subscr  Sex Relation Sub.  Ins. ID Effective Group Num   [facility] MERA LEONG 1975 Female Self [id number] [address] [facility] [address]         DIAGNOSIS & PROCEDURE:                       Procedure/Operation: COLONOSCOPY           CPT Code: 17141    Diagnosis:  SCREENING  FM H/O COLON CA    ICD10 Code: Z12.11   Z80.0    Location:  [facility]    Surgeon:  Bree Hawthorne    SCHEDULING INFORMATION:                          Date: 2023    Time: 9:30              Anesthesia:  MAC/TIVA                                                       Status:  Outpatient        Special Comments:         Electronically signed by Sharifa Tolentino MA on 2023 at 11:25 AM

## 2023-06-26 RX ORDER — POLYETHYLENE GLYCOL 3350, SODIUM SULFATE ANHYDROUS, SODIUM BICARBONATE, SODIUM CHLORIDE, POTASSIUM CHLORIDE 236; 22.74; 6.74; 5.86; 2.97 G/4L; G/4L; G/4L; G/4L; G/4L
4 POWDER, FOR SOLUTION ORAL ONCE
Qty: 4000 ML | Refills: 0 | OUTPATIENT
Start: 2023-06-26 | End: 2023-06-26

## 2023-06-28 ENCOUNTER — OFFICE VISIT (OUTPATIENT)
Dept: PRIMARY CARE CLINIC | Age: 48
End: 2023-06-28
Payer: COMMERCIAL

## 2023-06-28 VITALS
HEART RATE: 55 BPM | OXYGEN SATURATION: 100 % | HEIGHT: 64 IN | SYSTOLIC BLOOD PRESSURE: 126 MMHG | RESPIRATION RATE: 17 BRPM | DIASTOLIC BLOOD PRESSURE: 70 MMHG | BODY MASS INDEX: 21.48 KG/M2 | TEMPERATURE: 97.3 F | WEIGHT: 125.8 LBS

## 2023-06-28 DIAGNOSIS — I21.4 NSTEMI (NON-ST ELEVATED MYOCARDIAL INFARCTION) (HCC): ICD-10-CM

## 2023-06-28 DIAGNOSIS — I51.81 TAKOTSUBO CARDIOMYOPATHY: ICD-10-CM

## 2023-06-28 DIAGNOSIS — R45.89 DEPRESSED MOOD: ICD-10-CM

## 2023-06-28 DIAGNOSIS — D50.9 IRON DEFICIENCY ANEMIA, UNSPECIFIED IRON DEFICIENCY ANEMIA TYPE: ICD-10-CM

## 2023-06-28 DIAGNOSIS — I51.81 TAKOTSUBO CARDIOMYOPATHY: Primary | ICD-10-CM

## 2023-06-28 LAB
ALBUMIN SERPL-MCNC: 4.3 G/DL (ref 3.5–5.2)
ALP SERPL-CCNC: 62 U/L (ref 35–104)
ALT SERPL-CCNC: 11 U/L (ref 0–32)
ANION GAP SERPL CALCULATED.3IONS-SCNC: 10 MMOL/L (ref 7–16)
AST SERPL-CCNC: 19 U/L (ref 0–31)
BILIRUB SERPL-MCNC: <0.2 MG/DL (ref 0–1.2)
BUN SERPL-MCNC: 8 MG/DL (ref 6–20)
CALCIUM SERPL-MCNC: 9.4 MG/DL (ref 8.6–10.2)
CHLORIDE SERPL-SCNC: 106 MMOL/L (ref 98–107)
CO2 SERPL-SCNC: 23 MMOL/L (ref 22–29)
CREAT SERPL-MCNC: 0.8 MG/DL (ref 0.5–1)
GLUCOSE SERPL-MCNC: 91 MG/DL (ref 74–99)
POTASSIUM SERPL-SCNC: 3.8 MMOL/L (ref 3.5–5)
PROT SERPL-MCNC: 7.6 G/DL (ref 6.4–8.3)
SODIUM SERPL-SCNC: 139 MMOL/L (ref 132–146)

## 2023-06-28 PROCEDURE — 4004F PT TOBACCO SCREEN RCVD TLK: CPT | Performed by: STUDENT IN AN ORGANIZED HEALTH CARE EDUCATION/TRAINING PROGRAM

## 2023-06-28 PROCEDURE — 99214 OFFICE O/P EST MOD 30 MIN: CPT | Performed by: STUDENT IN AN ORGANIZED HEALTH CARE EDUCATION/TRAINING PROGRAM

## 2023-06-28 PROCEDURE — G8427 DOCREV CUR MEDS BY ELIG CLIN: HCPCS | Performed by: STUDENT IN AN ORGANIZED HEALTH CARE EDUCATION/TRAINING PROGRAM

## 2023-06-28 PROCEDURE — 1111F DSCHRG MED/CURRENT MED MERGE: CPT | Performed by: STUDENT IN AN ORGANIZED HEALTH CARE EDUCATION/TRAINING PROGRAM

## 2023-06-28 PROCEDURE — G8420 CALC BMI NORM PARAMETERS: HCPCS | Performed by: STUDENT IN AN ORGANIZED HEALTH CARE EDUCATION/TRAINING PROGRAM

## 2023-06-28 RX ORDER — SERTRALINE HYDROCHLORIDE 25 MG/1
25 TABLET, FILM COATED ORAL DAILY
Qty: 30 TABLET | Refills: 0 | Status: SHIPPED | OUTPATIENT
Start: 2023-06-28

## 2023-06-28 RX ORDER — FERROUS SULFATE 325(65) MG
325 TABLET ORAL
Qty: 90 TABLET | Refills: 1 | Status: SHIPPED | OUTPATIENT
Start: 2023-06-28

## 2023-07-12 PROBLEM — J44.9 CHRONIC OBSTRUCTIVE PULMONARY DISEASE (HCC): Status: ACTIVE | Noted: 2023-07-12

## 2023-07-12 PROBLEM — I51.81 STRESS-INDUCED CARDIOMYOPATHY: Status: ACTIVE | Noted: 2023-07-12

## 2023-07-12 PROBLEM — E78.00 PURE HYPERCHOLESTEROLEMIA: Status: ACTIVE | Noted: 2023-07-12

## 2023-07-12 PROBLEM — I25.10 CORONARY ARTERY DISEASE INVOLVING NATIVE CORONARY ARTERY OF NATIVE HEART WITHOUT ANGINA PECTORIS: Status: ACTIVE | Noted: 2023-07-12

## 2023-07-22 PROBLEM — Z12.11 SPECIAL SCREENING FOR MALIGNANT NEOPLASMS, COLON: Status: RESOLVED | Noted: 2023-06-22 | Resolved: 2023-07-22

## 2023-07-27 ENCOUNTER — OFFICE VISIT (OUTPATIENT)
Dept: CARDIOLOGY CLINIC | Age: 48
End: 2023-07-27
Payer: COMMERCIAL

## 2023-07-27 VITALS
RESPIRATION RATE: 12 BRPM | SYSTOLIC BLOOD PRESSURE: 100 MMHG | DIASTOLIC BLOOD PRESSURE: 68 MMHG | BODY MASS INDEX: 20.55 KG/M2 | WEIGHT: 120.4 LBS | HEIGHT: 64 IN | HEART RATE: 63 BPM

## 2023-07-27 DIAGNOSIS — E78.00 PURE HYPERCHOLESTEROLEMIA: ICD-10-CM

## 2023-07-27 DIAGNOSIS — I51.81 STRESS-INDUCED CARDIOMYOPATHY: Primary | ICD-10-CM

## 2023-07-27 DIAGNOSIS — J44.9 CHRONIC OBSTRUCTIVE PULMONARY DISEASE, UNSPECIFIED COPD TYPE (HCC): ICD-10-CM

## 2023-07-27 DIAGNOSIS — I25.10 CORONARY ARTERY DISEASE INVOLVING NATIVE CORONARY ARTERY OF NATIVE HEART WITHOUT ANGINA PECTORIS: ICD-10-CM

## 2023-07-27 PROCEDURE — 93000 ELECTROCARDIOGRAM COMPLETE: CPT | Performed by: INTERNAL MEDICINE

## 2023-07-27 PROCEDURE — G8427 DOCREV CUR MEDS BY ELIG CLIN: HCPCS | Performed by: INTERNAL MEDICINE

## 2023-07-27 PROCEDURE — 4004F PT TOBACCO SCREEN RCVD TLK: CPT | Performed by: INTERNAL MEDICINE

## 2023-07-27 PROCEDURE — 99215 OFFICE O/P EST HI 40 MIN: CPT | Performed by: INTERNAL MEDICINE

## 2023-07-27 PROCEDURE — G8420 CALC BMI NORM PARAMETERS: HCPCS | Performed by: INTERNAL MEDICINE

## 2023-07-27 PROCEDURE — 3023F SPIROM DOC REV: CPT | Performed by: INTERNAL MEDICINE

## 2023-07-27 NOTE — PROGRESS NOTES
OUTPATIENT CARDIOLOGY FOLLOW-UP    Name: Julian Esquivel    Age: 50 y.o. Primary Care Physician: Franklin Rubio DO    Date of Service: 7/27/2023    Chief Complaint: Stress related cardiomyopathy, coronary atherosclerosis, chronic obstructive lung disease    Interim History: Since her evaluation during hospitalization with chest discomfort and elevated presence of troponin levels consistent with that of a non-ST elevation myocardial infarction with assessment demonstrated evidence of only moderate stenosis of the proximal left anterior descending and regional wall motion at base consistent with that of a stress related cardiomyopathy, the patient is remained compensated with no additional symptoms of anginal-like chest discomfort or other ischemic equivalents nor manifestations of decompensated left ventricular systolic dysfunction on optimal medical therapy. She has tolerated therapy and remains normotensive. Unfortunately, she has continued tobacco consumption in excess of 1/2 pack of cigarettes on a daily basis. Review of Systems: The remainder of a complete multisystem review including consitutional, central nervous, respiratory, circulatory, gastrointestinal, genitourinary, endocrinologic, hematologic, musculoskeletal and psychiatric are negative.     Past Medical History:  Past Medical History:   Diagnosis Date    Chronic hip pain     Urinary incontinence        Past Surgical History:  Past Surgical History:   Procedure Laterality Date    CARDIAC CATHETERIZATION  06/13/2023    COLONOSCOPY N/A 01/12/2022    COLORECTAL CANCER SCREENING, NOT HIGH RISK performed by Noy Mars DO at [address] Left     upper femur  (approx 2017)    HIP SURGERY Left 07/09/2016    IM NAILING LEFT HIP    TOTAL HIP ARTHROPLASTY Left 02/08/2022    CONVERSION OF PRIOR HIP SURGERY TO LEFT TOTAL HIP ARTHROPLASTY ROBOTIC ASSISTED CIARA performed by Roberto Jimenez DO at [address]

## 2023-07-31 PROBLEM — Z12.11 SPECIAL SCREENING FOR MALIGNANT NEOPLASMS, COLON: Status: ACTIVE | Noted: 2023-06-22

## 2023-07-31 NOTE — PROGRESS NOTES
Pt  scheduled for colonoscopy 8/2/23 with Dr Tamela Ochoa states \"I have to Cx because i'm  on my period\" explained to pt does not need to Cx colonoscopy if on period. Pt reports \" I still want to Cx\" Office # given.

## 2023-08-23 DIAGNOSIS — Z96.642 HISTORY OF TOTAL HIP ARTHROPLASTY, LEFT: Primary | ICD-10-CM

## 2023-08-30 PROBLEM — Z12.11 SPECIAL SCREENING FOR MALIGNANT NEOPLASMS, COLON: Status: RESOLVED | Noted: 2023-06-22 | Resolved: 2023-08-30

## 2023-10-12 NOTE — PROGRESS NOTES
[facility] [employer] PRE-ADMISSION TESTING INSTRUCTIONS      ARRIVAL INSTRUCTIONS:  [x] Parking the day of Surgery is located in the Main Entrance lot. Upon entering the main door make an immediate right to the surgery reception desk. [x] Bring photo ID and insurance card    [] Bring in a copy of Living will or Durable Power of  papers. [x] Please be sure to arrange for responsible adult to provide transportation to and from the hospital    [x] Please arrange for responsible adult to be with you for the 24 hour period post procedure due to having anesthesia    [x] If you awake am of surgery not feeling well or have temperature >100 please call [phone]    GENERAL INSTRUCTIONS:    [x] Nothing by mouth after midnight, including gum, candy, mints or water    [x] You may brush your teeth, but do not swallow any water    [x] Take medications as instructed with 1-2 oz of water     Stop herbal supplements and vitamins 5 days prior to pr[x]ocedure    [x] Follow preop dosing of blood thinners per physician instructions    [] Take 1/2 dose of evening insulin, but no insulin after midnight    [] No oral diabetic medications after midnight    [] If diabetic and have low blood sugar or feel symptomatic, take 1-2oz apple juice only    [] Bring inhalers day of surgery    [] Bring C-PAP/ Bi-Pap day of surgery    [x] Bring urine specimen day of surgery    [x] Shower or bath with soap, lather and rinse well, AM of Surgery, no lotion, powders or creams to surgical site    [x] Follow bowel prep as instructed per surgeon    [x] No tobacco products within 24 hours of surgery     [x] No alcohol or illegal drug use within 24 hours of surgery.     [x] Jewelry, body piercing's, eyeglasses, contact lenses and dentures are not permitted into surgery (bring cases)      [x] Please do not wear any nail polish, make up or hair products on the day of surgery    [x] You can expect a call the business day prior to

## 2023-10-12 NOTE — PROGRESS NOTES
Dr Darin Thakkar notified pt with Hx of non ST elevation MI 6/15/23 stress related cardiomyopathy, mod stenosis prox LAD follows evry 3 mos with Dr Virgil Calderón next appt 10/27/23 scheduled for colonoscopy 10/16/23 with Dr Kimberlyn Palacios. Dr Darin Thakkar request cardiac clearance prior to procedure and pt to hold Entresto am of procedure. Left VM with Melissa at DR Kimberlyn Palacios office of above event and cardiac clearance required. Pt verbalized understanding to hold entresto  day of procedure 10/16/23 only. Pt denies recent cardiac complaints/symptoms.

## 2023-10-13 ENCOUNTER — TELEPHONE (OUTPATIENT)
Dept: CARDIOLOGY CLINIC | Age: 48
End: 2023-10-13

## 2023-10-16 ENCOUNTER — ANESTHESIA EVENT (OUTPATIENT)
Dept: ENDOSCOPY | Age: 48
End: 2023-10-16
Payer: COMMERCIAL

## 2023-10-16 ENCOUNTER — HOSPITAL ENCOUNTER (OUTPATIENT)
Age: 48
Setting detail: OUTPATIENT SURGERY
Discharge: HOME | End: 2023-10-16
Attending: SURGERY | Admitting: SURGERY
Payer: COMMERCIAL

## 2023-10-16 ENCOUNTER — ANESTHESIA (OUTPATIENT)
Dept: ENDOSCOPY | Age: 48
End: 2023-10-16
Payer: COMMERCIAL

## 2023-10-16 VITALS
BODY MASS INDEX: 20.49 KG/M2 | HEART RATE: 78 BPM | OXYGEN SATURATION: 98 % | RESPIRATION RATE: 16 BRPM | WEIGHT: 120 LBS | DIASTOLIC BLOOD PRESSURE: 79 MMHG | HEIGHT: 64 IN | SYSTOLIC BLOOD PRESSURE: 139 MMHG

## 2023-10-16 DIAGNOSIS — Z80.0 FAMILY HISTORY OF MALIGNANT NEOPLASM OF GASTROINTESTINAL TRACT: ICD-10-CM

## 2023-10-16 DIAGNOSIS — Z12.11 SPECIAL SCREENING FOR MALIGNANT NEOPLASMS, COLON: ICD-10-CM

## 2023-10-16 LAB
HCG, URINE, POC: NEGATIVE
Lab: NORMAL
NEGATIVE QC PASS/FAIL: NORMAL
POSITIVE QC PASS/FAIL: NORMAL

## 2023-10-16 PROCEDURE — 3700000000 HC ANESTHESIA ATTENDED CARE: Performed by: SURGERY

## 2023-10-16 PROCEDURE — 7100000011 HC PHASE II RECOVERY - ADDTL 15 MIN: Performed by: SURGERY

## 2023-10-16 PROCEDURE — 6360000002 HC RX W HCPCS: Performed by: NURSE ANESTHETIST, CERTIFIED REGISTERED

## 2023-10-16 PROCEDURE — 2580000003 HC RX 258: Performed by: NURSE ANESTHETIST, CERTIFIED REGISTERED

## 2023-10-16 PROCEDURE — 88305 TISSUE EXAM BY PATHOLOGIST: CPT

## 2023-10-16 PROCEDURE — 3609010300 HC COLONOSCOPY W/BIOPSY SINGLE/MULTIPLE: Performed by: SURGERY

## 2023-10-16 PROCEDURE — 7100000010 HC PHASE II RECOVERY - FIRST 15 MIN: Performed by: SURGERY

## 2023-10-16 PROCEDURE — 2709999900 HC NON-CHARGEABLE SUPPLY: Performed by: SURGERY

## 2023-10-16 PROCEDURE — 3700000001 HC ADD 15 MINUTES (ANESTHESIA): Performed by: SURGERY

## 2023-10-16 PROCEDURE — 45380 COLONOSCOPY AND BIOPSY: CPT | Performed by: SURGERY

## 2023-10-16 RX ORDER — PROPOFOL 10 MG/ML
INJECTION, EMULSION INTRAVENOUS PRN
Status: DISCONTINUED | OUTPATIENT
Start: 2023-10-16 | End: 2023-10-16 | Stop reason: SDUPTHER

## 2023-10-16 RX ORDER — SODIUM CHLORIDE 9 MG/ML
INJECTION, SOLUTION INTRAVENOUS CONTINUOUS PRN
Status: DISCONTINUED | OUTPATIENT
Start: 2023-10-16 | End: 2023-10-16 | Stop reason: SDUPTHER

## 2023-10-16 RX ADMIN — PROPOFOL 200 MG: 10 INJECTION, EMULSION INTRAVENOUS at 10:12

## 2023-10-16 RX ADMIN — SODIUM CHLORIDE: 9 INJECTION, SOLUTION INTRAVENOUS at 09:40

## 2023-10-16 ASSESSMENT — LIFESTYLE VARIABLES: SMOKING_STATUS: 1

## 2023-10-16 NOTE — H&P
Patient's office history and physical was reviewed. Patient examined. There has been no change in the patient's history and physical.      Physician Signature: Electronically signed by Dr. Dex Pitts Surgery History and Physical     Patient's Name/Date of Birth: Krishna Brandt / 1975     Date: 10/16/23     PCP: Anna Carranza DO     Referring Physician:   Anna Carranza DO  [phone]     CHIEF COMPLAINT:         Chief Complaint   Patient presents with    New Patient    Colonoscopy       Colonoscopy 1 yr recall, poor prep, fm hx of colon cancer            HISTORY OF PRESENT ILLNESS:     Krishna Brandt is an 50 y.o. female who presents for a colonoscopy. The patient said she had a poor prep last year but does not think she is constipated. No nausea, vomiting, diarrhea, constipation. No changes in stool caliber. No bloody or black stools. No abdominal pain. No unintentional weight loss. She has a family history of colon cancer - her aunt. The patient has a known history of: no known risk factors. The patient has had a colonoscopy before - last year she had a poor prep and it was recommended she have another this year.      Past Medical History:   Past Medical History        Past Medical History:   Diagnosis Date    Chronic hip pain      Urinary incontinence              Past Surgical History:   Past Surgical History         Past Surgical History:   Procedure Laterality Date    CARDIAC CATHETERIZATION   06/13/2023    COLONOSCOPY N/A 01/12/2022     COLORECTAL CANCER SCREENING, NOT HIGH RISK performed by Dylan Venegas DO at [address] Left       upper femur  (approx 2017)    HIP SURGERY Left 07/09/2016     IM NAILING LEFT HIP    TOTAL HIP ARTHROPLASTY Left 02/08/2022     CONVERSION OF PRIOR HIP SURGERY TO LEFT TOTAL HIP ARTHROPLASTY ROBOTIC ASSISTED CIARA performed by Roberta Rodriguez DO at [address]                Allergies:

## 2023-10-16 NOTE — DISCHARGE INSTRUCTIONS
[address] Colonoscopy PROCEDURE DISCHARGE INSTRUCTIONS  You may be drowsy or lightheaded after receiving sedation or anesthesia. A responsible person should be with you for the next 24 hours. Please follow the instructions checked below:    DIET INSTRUCTIONS:  [x]Start with light diet and progress to your normal diet as you feel like eating. If you experience nausea or repeated episodes of vomiting which persist beyond 12-24 hours, notify your doctor. []Other     ACTIVITY INSTRUCTIONS:  [x]Rest today. Increase activity as tolerated    []No heavy lifting or strenuous activity     [x]No driving for today  []Other      MEDICATION INSTRUCTIONS:    []Prescriptions sent with you. Use as directed. When taking pain medications, you may experience dizziness or drowsiness. Do not drink alcohol or drive when taking these medications. [x]Continue preop medications                               Post-procedure Care   If any tissue was removed: It will be sent to a lab to be examined. It may take 1-2 weeks for results. The doctor will usually give an initial report after the scope is removed. Other tests may be recommended. A small amount of bleeding may occur during the first few days after the procedure. When you return home after the procedure, be sure to follow your doctor's instructions, which may include:   Resume medicines as instructed by your doctor. Resume normal diet, unless directed otherwise by your doctor. The sedative will make you drowsy. Avoid driving, operating machinery, or making important decisions for the rest of the day. Rest for the remainder of the day. After arriving home, contact your doctor if any of the following occurs:   Bleeding from your rectum, notify your doctor if you pass a teaspoonful of blood or more.    Black, tarry stools   Severe abdominal pain   Hard, swollen abdomen   Signs of infection, including fever or chills   Inability to pass gas or

## 2023-10-16 NOTE — OP NOTE
Operative Note: Colonoscopy    Manisha Degroot     DATE OF PROCEDURE: 10/16/2023  SURGEON: Dr. Carla Antonio MD, M.D. PREOPERATIVE DIAGNOSES:    Family history of colon cancer  Constipation, h/o poor prep for colonoscopy    POSTOPERATIVE DIAGNOSES:  Moderate prep  Small rectal polyp    SPECIMENS:  ID Type Source Tests Collected by Time Destination   A : POLYP BX RECTUM Tissue Colon SURGICAL PATHOLOGY Carla Antonio MD 10/16/2023 1011         OPERATION:   Colonoscopy to the cecum with forceps polypectomy      ANESTHESIA: LMAC    COMPLICATIONS: None. BLOOD LOSS: Minimal    Procedure Note:    CONSENT AND INDICATIONS:  This is a 50y.o. year old female who is having the above  I have discussed with the patient and/or the patient representative the indication, alternatives, and the possible risks and/or complications of the planned procedure and the anesthesia methods. The patient and/or patient representative appear to understand and agree to proceed. OPERATIONS: Bowel prep was done yesterday until the bowels were clear. The patient was placed on the table and sedated by anesthesia. A rectal exam was performed and no mass was felt. A lubricated scope was passed into the rectum. The scope was passed all the way around through the sigmoid, descending, transverse and ascending colon to the cecum. The bowel prep was moderate with some solid stool in the colon, including the cecum. There was a small rectal polyp removed via forceps polypectomy. The cecum was identified by the appendiceal orifice, ileocecal valve, and light reflex in the RLQ. The scope was then slowly withdrawn, each area was examined again on the way out. No other abnormalities were seen, though something small could have been missed due to the intermittent solid stool throughout the colon. The scope was retroflexed in the rectum and it was normal. The patient tolerated the procedure well.      PLAN: Follow up

## 2023-10-17 NOTE — ANESTHESIA POSTPROCEDURE EVALUATION
Department of Anesthesiology  Postprocedure Note    Patient: Ekta Zamora  MRN: [id number]  YOB: 1975  Date of evaluation: 10/17/2023      Procedure Summary     Date: 10/16/23 Room / Location: [facility] / [facility]    Anesthesia Start: 4028 Anesthesia Stop: 0495    Procedure: COLONOSCOPY WITH BIOPSY Diagnosis:       Special screening for malignant neoplasms, colon      Family history of malignant neoplasm of gastrointestinal tract      (Special screening for malignant neoplasms, colon [Z12.11])      (Family history of malignant neoplasm of gastrointestinal tract [Z80.0])    Surgeons: Tyron Starks MD Responsible Provider: Tania Love MD    Anesthesia Type: MAC ASA Status: 3          Anesthesia Type: MAC    Jorge Phase I:      Jorge Phase II: Jorge Score: 10      Anesthesia Post Evaluation    Patient location during evaluation: PACU  Patient participation: complete - patient participated  Level of consciousness: awake  Airway patency: patent  Nausea & Vomiting: no nausea and no vomiting  Complications: no  Cardiovascular status: hemodynamically stable  Respiratory status: acceptable  Hydration status: stable  Pain management: adequate

## 2023-10-18 LAB — SURGICAL PATHOLOGY REPORT: NORMAL

## 2024-07-15 ENCOUNTER — HOSPITAL ENCOUNTER (EMERGENCY)
Age: 49
Discharge: HOME | End: 2024-07-15
Attending: STUDENT IN AN ORGANIZED HEALTH CARE EDUCATION/TRAINING PROGRAM
Payer: COMMERCIAL

## 2024-07-15 ENCOUNTER — APPOINTMENT (OUTPATIENT)
Dept: CT IMAGING | Age: 49
End: 2024-07-15
Payer: COMMERCIAL

## 2024-07-15 VITALS
OXYGEN SATURATION: 100 % | HEIGHT: 64 IN | TEMPERATURE: 98.8 F | RESPIRATION RATE: 15 BRPM | HEART RATE: 52 BPM | WEIGHT: 125 LBS | DIASTOLIC BLOOD PRESSURE: 98 MMHG | BODY MASS INDEX: 21.34 KG/M2 | SYSTOLIC BLOOD PRESSURE: 163 MMHG

## 2024-07-15 DIAGNOSIS — K52.9 COLITIS: Primary | ICD-10-CM

## 2024-07-15 LAB
ALBUMIN SERPL-MCNC: 3.9 G/DL (ref 3.5–5.2)
ALP SERPL-CCNC: 60 U/L (ref 35–104)
ALT SERPL-CCNC: 21 U/L (ref 0–32)
ANION GAP SERPL CALCULATED.3IONS-SCNC: 12 MMOL/L (ref 7–16)
AST SERPL-CCNC: 42 U/L (ref 0–31)
BASOPHILS # BLD: 0.02 K/UL (ref 0–0.2)
BASOPHILS NFR BLD: 0 % (ref 0–2)
BILIRUB SERPL-MCNC: 0.3 MG/DL (ref 0–1.2)
BILIRUB UR QL STRIP: NEGATIVE
BUN SERPL-MCNC: 7 MG/DL (ref 6–20)
CALCIUM SERPL-MCNC: 9 MG/DL (ref 8.6–10.2)
CHLORIDE SERPL-SCNC: 102 MMOL/L (ref 98–107)
CLARITY UR: CLEAR
CO2 SERPL-SCNC: 23 MMOL/L (ref 22–29)
COLOR UR: YELLOW
CREAT SERPL-MCNC: 0.9 MG/DL (ref 0.5–1)
EKG ATRIAL RATE: 52 BPM
EKG P AXIS: 46 DEGREES
EKG P-R INTERVAL: 188 MS
EKG Q-T INTERVAL: 498 MS
EKG QRS DURATION: 92 MS
EKG QTC CALCULATION (BAZETT): 463 MS
EKG R AXIS: 34 DEGREES
EKG T AXIS: 51 DEGREES
EKG VENTRICULAR RATE: 52 BPM
EOSINOPHIL # BLD: 0.04 K/UL (ref 0.05–0.5)
EOSINOPHILS RELATIVE PERCENT: 1 % (ref 0–6)
ERYTHROCYTE [DISTWIDTH] IN BLOOD BY AUTOMATED COUNT: 17.3 % (ref 11.5–15)
GFR, ESTIMATED: 78 ML/MIN/1.73M2
GLUCOSE SERPL-MCNC: 99 MG/DL (ref 74–99)
GLUCOSE UR STRIP-MCNC: NEGATIVE MG/DL
HCG, URINE, POC: NEGATIVE
HCT VFR BLD AUTO: 30.2 % (ref 34–48)
HGB BLD-MCNC: 9.4 G/DL (ref 11.5–15.5)
HGB UR QL STRIP.AUTO: ABNORMAL
IMM GRANULOCYTES # BLD AUTO: <0.03 K/UL (ref 0–0.58)
IMM GRANULOCYTES NFR BLD: 0 % (ref 0–5)
KETONES UR STRIP-MCNC: NEGATIVE MG/DL
LACTATE BLDV-SCNC: 0.8 MMOL/L (ref 0.5–2.2)
LEUKOCYTE ESTERASE UR QL STRIP: NEGATIVE
LIPASE SERPL-CCNC: 28 U/L (ref 13–60)
LYMPHOCYTES NFR BLD: 1.45 K/UL (ref 1.5–4)
LYMPHOCYTES RELATIVE PERCENT: 28 % (ref 20–42)
Lab: NORMAL
MCH RBC QN AUTO: 24.7 PG (ref 26–35)
MCHC RBC AUTO-ENTMCNC: 31.1 G/DL (ref 32–34.5)
MCV RBC AUTO: 79.5 FL (ref 80–99.9)
MONOCYTES NFR BLD: 0.53 K/UL (ref 0.1–0.95)
MONOCYTES NFR BLD: 10 % (ref 2–12)
MUCOUS THREADS URNS QL MICRO: PRESENT
NEGATIVE QC PASS/FAIL: NORMAL
NEUTROPHILS NFR BLD: 60 % (ref 43–80)
NEUTS SEG NFR BLD: 3.07 K/UL (ref 1.8–7.3)
NITRITE UR QL STRIP: NEGATIVE
PH UR STRIP: 6.5 [PH] (ref 5–9)
PLATELET # BLD AUTO: 348 K/UL (ref 130–450)
PMV BLD AUTO: 8.5 FL (ref 7–12)
POSITIVE QC PASS/FAIL: NORMAL
POTASSIUM SERPL-SCNC: 3.5 MMOL/L (ref 3.5–5)
PROT SERPL-MCNC: 7.5 G/DL (ref 6.4–8.3)
PROT UR STRIP-MCNC: 30 MG/DL
RBC # BLD AUTO: 3.8 M/UL (ref 3.5–5.5)
RBC #/AREA URNS HPF: ABNORMAL /HPF
SODIUM SERPL-SCNC: 137 MMOL/L (ref 132–146)
SP GR UR STRIP: >1.03 (ref 1–1.03)
TROPONIN I SERPL HS-MCNC: 6 NG/L (ref 0–9)
UROBILINOGEN UR STRIP-ACNC: 0.2 EU/DL (ref 0–1)
WBC #/AREA URNS HPF: ABNORMAL /HPF
WBC OTHER # BLD: 5.1 K/UL (ref 4.5–11.5)

## 2024-07-15 PROCEDURE — 93005 ELECTROCARDIOGRAM TRACING: CPT | Performed by: STUDENT IN AN ORGANIZED HEALTH CARE EDUCATION/TRAINING PROGRAM

## 2024-07-15 PROCEDURE — 96374 THER/PROPH/DIAG INJ IV PUSH: CPT

## 2024-07-15 PROCEDURE — 83605 ASSAY OF LACTIC ACID: CPT

## 2024-07-15 PROCEDURE — 93010 ELECTROCARDIOGRAM REPORT: CPT | Performed by: INTERNAL MEDICINE

## 2024-07-15 PROCEDURE — 99285 EMERGENCY DEPT VISIT HI MDM: CPT

## 2024-07-15 PROCEDURE — 80053 COMPREHEN METABOLIC PANEL: CPT

## 2024-07-15 PROCEDURE — 81001 URINALYSIS AUTO W/SCOPE: CPT

## 2024-07-15 PROCEDURE — 96375 TX/PRO/DX INJ NEW DRUG ADDON: CPT

## 2024-07-15 PROCEDURE — 85025 COMPLETE CBC W/AUTO DIFF WBC: CPT

## 2024-07-15 PROCEDURE — 6360000004 HC RX CONTRAST MEDICATION: Performed by: RADIOLOGY

## 2024-07-15 PROCEDURE — 6360000002 HC RX W HCPCS

## 2024-07-15 PROCEDURE — 83690 ASSAY OF LIPASE: CPT

## 2024-07-15 PROCEDURE — 74177 CT ABD & PELVIS W/CONTRAST: CPT

## 2024-07-15 PROCEDURE — 84484 ASSAY OF TROPONIN QUANT: CPT

## 2024-07-15 RX ORDER — IBUPROFEN 600 MG/1
600 TABLET ORAL 3 TIMES DAILY PRN
Qty: 30 TABLET | Refills: 0 | Status: SHIPPED | OUTPATIENT
Start: 2024-07-15

## 2024-07-15 RX ORDER — FAMOTIDINE 10 MG
20 TABLET ORAL 2 TIMES DAILY
Qty: 120 TABLET | Refills: 0 | Status: SHIPPED | OUTPATIENT
Start: 2024-07-15 | End: 2024-08-14

## 2024-07-15 RX ORDER — ACETAMINOPHEN 500 MG
500 TABLET ORAL 4 TIMES DAILY PRN
Qty: 120 TABLET | Refills: 0 | Status: SHIPPED | OUTPATIENT
Start: 2024-07-15

## 2024-07-15 RX ORDER — DICYCLOMINE HYDROCHLORIDE 10 MG/1
10 CAPSULE ORAL ONCE
Status: DISCONTINUED | OUTPATIENT
Start: 2024-07-15 | End: 2024-07-15 | Stop reason: HOSPADM

## 2024-07-15 RX ORDER — KETOROLAC TROMETHAMINE 30 MG/ML
30 INJECTION, SOLUTION INTRAMUSCULAR; INTRAVENOUS ONCE
Status: COMPLETED | OUTPATIENT
Start: 2024-07-15 | End: 2024-07-15

## 2024-07-15 RX ORDER — ACETAMINOPHEN 325 MG/1
650 TABLET ORAL ONCE
Status: DISCONTINUED | OUTPATIENT
Start: 2024-07-15 | End: 2024-07-15 | Stop reason: HOSPADM

## 2024-07-15 RX ORDER — ONDANSETRON 2 MG/ML
4 INJECTION INTRAMUSCULAR; INTRAVENOUS EVERY 6 HOURS PRN
Status: DISCONTINUED | OUTPATIENT
Start: 2024-07-15 | End: 2024-07-15 | Stop reason: HOSPADM

## 2024-07-15 RX ORDER — ONDANSETRON 4 MG/1
4 TABLET, ORALLY DISINTEGRATING ORAL 3 TIMES DAILY PRN
Qty: 21 TABLET | Refills: 0 | Status: SHIPPED | OUTPATIENT
Start: 2024-07-15

## 2024-07-15 RX ADMIN — KETOROLAC TROMETHAMINE 30 MG: 30 INJECTION INTRAMUSCULAR; INTRAVENOUS at 07:51

## 2024-07-15 RX ADMIN — ONDANSETRON 4 MG: 2 INJECTION INTRAMUSCULAR; INTRAVENOUS at 07:50

## 2024-07-15 RX ADMIN — IOPAMIDOL 75 ML: 755 INJECTION, SOLUTION INTRAVENOUS at 08:46

## 2024-07-15 ASSESSMENT — PAIN DESCRIPTION - ORIENTATION
ORIENTATION: LEFT;LOWER
ORIENTATION: LEFT;LOWER

## 2024-07-15 ASSESSMENT — PAIN DESCRIPTION - LOCATION
LOCATION: ABDOMEN
LOCATION: ABDOMEN

## 2024-07-15 ASSESSMENT — PAIN SCALES - GENERAL
PAINLEVEL_OUTOF10: 5
PAINLEVEL_OUTOF10: 10
PAINLEVEL_OUTOF10: 10

## 2024-07-15 ASSESSMENT — PAIN - FUNCTIONAL ASSESSMENT
PAIN_FUNCTIONAL_ASSESSMENT: NONE - DENIES PAIN
PAIN_FUNCTIONAL_ASSESSMENT: 0-10
PAIN_FUNCTIONAL_ASSESSMENT: 0-10

## 2024-07-15 ASSESSMENT — PAIN DESCRIPTION - DESCRIPTORS: DESCRIPTORS: ACHING

## 2024-07-15 ASSESSMENT — LIFESTYLE VARIABLES
HOW MANY STANDARD DRINKS CONTAINING ALCOHOL DO YOU HAVE ON A TYPICAL DAY: PATIENT DOES NOT DRINK
HOW OFTEN DO YOU HAVE A DRINK CONTAINING ALCOHOL: NEVER

## 2024-07-15 NOTE — ED NOTES
Patient tolerated ambulation. Limp noted but per patient it is baseline for her due to hip replacement.

## 2024-07-15 NOTE — ED PROVIDER NOTES
[facility] EMERGENCY DEPARTMENT  EMERGENCY DEPARTMENT ENCOUNTER        Pt Name: Ingrid Hui  MRN: [id number]  Birthdate 1975  Date of evaluation: 7/15/2024  Provider: Shiv Wheatley MD  PCP: Sourav Hoskins DO  Note Started: 6:56 AM EDT 7/15/24    CHIEF COMPLAINT       Chief Complaint   Patient presents with    Abdominal Pain     lower abdominal pain starting around 0300       HISTORY OF PRESENT ILLNESS + ROS:   History From: PT    Limitations to history : None    Ingrid Hui is a 49 y.o. female with pmhx of NSTEMI, chronic hip pain, urinary incontinence, and midline D&C incision who presents with new onset lower abdominal pain starting around 1500 on 7/14. It was abrupt in onset, aching, and radiates towards her back. She usually has a bowel movement every morning but hasn't had one today. She reports decreased appetite, but her last meal and drink was 6-7 pm last night (pop and pork chops). She tried tylenol and Excedrin for the pain without relief. She reports her last period was 7/3, but also says that her periods are much less regular in the last year.     Patient denies fever, chills, headache, shortness of breath, chest pain, nausea, vomiting, diarrhea, lightheadedness, dysuria, hematuria, hematochezia, and melena.    Is this patient to be included in the SEP-1 Core Measure due to severe sepsis or septic shock?   No Exclusion criteria - the patient is NOT to be included for SEP-1 Core Measure due to: 2+ SIRS criteria are not met    Nursing Notes were all reviewed and agreed with or any disagreements were addressed in the HPI.      Medical Decision Making/Differential Diagnosis/ED Course:    CC/HPI Summary, Social Determinants of health, Records Reviewed, DDx, testing done/not done, ED Course, Reassessment, disposition considerations/shared decision making with patient, consults, disposition:      ED Course as of 07/15/24 0737   Mon Jul 15, 2024   0731

## 2025-04-28 ENCOUNTER — HOSPITAL ENCOUNTER (EMERGENCY)
Age: 50
Discharge: HOME | End: 2025-04-28
Payer: COMMERCIAL

## 2025-04-28 ENCOUNTER — APPOINTMENT (OUTPATIENT)
Dept: GENERAL RADIOLOGY | Age: 50
End: 2025-04-28
Payer: COMMERCIAL

## 2025-04-28 ENCOUNTER — APPOINTMENT (OUTPATIENT)
Dept: ULTRASOUND IMAGING | Age: 50
End: 2025-04-28
Payer: COMMERCIAL

## 2025-04-28 VITALS
OXYGEN SATURATION: 100 % | DIASTOLIC BLOOD PRESSURE: 98 MMHG | SYSTOLIC BLOOD PRESSURE: 155 MMHG | RESPIRATION RATE: 18 BRPM | TEMPERATURE: 98.2 F | HEART RATE: 90 BPM

## 2025-04-28 DIAGNOSIS — S83.92XA SPRAIN OF LEFT KNEE, UNSPECIFIED LIGAMENT, INITIAL ENCOUNTER: Primary | ICD-10-CM

## 2025-04-28 PROCEDURE — 93971 EXTREMITY STUDY: CPT

## 2025-04-28 PROCEDURE — 6370000000 HC RX 637 (ALT 250 FOR IP)

## 2025-04-28 PROCEDURE — 96372 THER/PROPH/DIAG INJ SC/IM: CPT

## 2025-04-28 PROCEDURE — 73562 X-RAY EXAM OF KNEE 3: CPT

## 2025-04-28 PROCEDURE — 6360000002 HC RX W HCPCS

## 2025-04-28 PROCEDURE — 99284 EMERGENCY DEPT VISIT MOD MDM: CPT

## 2025-04-28 RX ORDER — KETOROLAC TROMETHAMINE 30 MG/ML
30 INJECTION, SOLUTION INTRAMUSCULAR; INTRAVENOUS ONCE
Status: COMPLETED | OUTPATIENT
Start: 2025-04-28 | End: 2025-04-28

## 2025-04-28 RX ORDER — HYDROCODONE BITARTRATE AND ACETAMINOPHEN 5; 325 MG/1; MG/1
1 TABLET ORAL ONCE
Status: COMPLETED | OUTPATIENT
Start: 2025-04-28 | End: 2025-04-28

## 2025-04-28 RX ORDER — IBUPROFEN 600 MG/1
600 TABLET, FILM COATED ORAL 3 TIMES DAILY PRN
Qty: 30 TABLET | Refills: 0 | Status: SHIPPED | OUTPATIENT
Start: 2025-04-28

## 2025-04-28 RX ADMIN — HYDROCODONE BITARTRATE AND ACETAMINOPHEN 1 TABLET: 5; 325 TABLET ORAL at 21:43

## 2025-04-28 RX ADMIN — KETOROLAC TROMETHAMINE 30 MG: 30 INJECTION, SOLUTION INTRAMUSCULAR at 17:33

## 2025-04-28 ASSESSMENT — PAIN SCALES - GENERAL
PAINLEVEL_OUTOF10: 7
PAINLEVEL_OUTOF10: 7
PAINLEVEL_OUTOF10: 10

## 2025-04-28 ASSESSMENT — PAIN DESCRIPTION - LOCATION: LOCATION: KNEE

## 2025-04-28 ASSESSMENT — PAIN - FUNCTIONAL ASSESSMENT: PAIN_FUNCTIONAL_ASSESSMENT: 0-10

## 2025-04-28 ASSESSMENT — PAIN DESCRIPTION - ORIENTATION: ORIENTATION: LEFT

## 2025-04-29 NOTE — DISCHARGE INSTRUCTIONS
Vascular duplex lower extremity venous left   Final Result   No evidence of DVT in the left lower extremity.         XR KNEE LEFT (3 VIEWS)   Final Result   No acute bony abnormalities.      Mild joint effusion.         -Follow-up with your primary care doctor  -Elevate leg, ice leg, take anti-inflammatories (ibuprofen) as prescribed  -Use knee immobilizer and crutches to help with ambulating until pain begins to improve.  If pain is not improving, may need MRI of the knee to check for any ligament damage.  I also attached information for orthopedic doctor to discharge paperwork, Dr. Ochoa.  -Return to the emergency department with any new or concerning symptoms or if you are unable to control pain.

## 2025-04-29 NOTE — ED PROVIDER NOTES
agreement with plan to be discharged and she is in agreement with follow-up plans.  She is stable for discharge home.    Plan of Care/Counseling:  RANJANA Sanchez NP reviewed today's visit with the patient in addition to providing specific details for the plan of care and counseling regarding the diagnosis and prognosis.  Questions are answered at this time and are agreeable with the plan.    Assessment      1. Sprain of left knee, unspecified ligament, initial encounter      Plan   Discharged home.  Patient condition is stable    New Medications     Discharge Medication List as of 4/28/2025  9:37 PM        Electronically signed by RANJANA Sanchez NP   DD: 4/29/25  **This report was transcribed using voice recognition software. Every effort was made to ensure accuracy; however, inadvertent computerized transcription errors may be present.  END OF ED PROVIDER NOTE      Sourav White APRN - NP  04/29/25 [id number]       Sourav White APRN - NP  04/29/25 [id number]

## 2025-04-30 ENCOUNTER — TELEPHONE (OUTPATIENT)
Dept: PRIMARY CARE CLINIC | Age: 50
End: 2025-04-30

## 2025-04-30 NOTE — TELEPHONE ENCOUNTER
----- Message from Eden SMITH sent at 4/29/2025  3:25 PM EDT -----  Regarding: [facility] Appointment Request  [facility] Appointment Request    Patient needs appointment for [facility] Appointment Type: ED Follow-Up.    Patient Requested Dates(s): As soon as possible  Patient Requested Time: Any time bore 10:00 AM or after 01:00 PM  Provider Name: Gato Sourav MERRICK     Reason for Appointment Request: Established Patient - Available appointments did not meet patient need. Patient have an appointment on May 13, 2025 for ED Follow Up regarding her knee and she's requesting for soonest appointment.  --------------------------------------------------------------------------------------------------------------------------    Relationship to Patient: Self     Call Back Information: OK to leave message on voicemail  Preferred Call Back Number: Phone [phone]

## 2025-05-01 PROBLEM — J44.9 CHRONIC OBSTRUCTIVE PULMONARY DISEASE (HCC): Status: RESOLVED | Noted: 2023-07-12 | Resolved: 2025-05-01

## 2025-05-01 NOTE — PROGRESS NOTES
[facility] Primary Care  Department of Family Medicine      Patient:  Ingrid Hui 50 y.o. female     Date of Service: 5/2/25      Chief complaint:   Chief Complaint   Patient presents with    ED Follow-up         History ofPresent Illness   The patient is a 50 y.o. female  presented to the clinic with complaints as above.    Left knee pain  -ED f/u  -after fall and landing on left knee  -imaging ok, dc'd to home  -currently, feeling ok  -taking ibuprofen which does help   -swelling improving somewhat   -cannot bear any weight on it       Having issues with sleep     Past Medical History:      Diagnosis Date    Chronic hip pain     H/O non-ST elevation myocardial infarction (NSTEMI) 06/15/2023    follows with Dr Kaur every 3 mos  .pt reports  next appt 10/27/23    Urinary incontinence        PastSurgical History:        Procedure Laterality Date    CARDIAC CATHETERIZATION  06/13/2023    COLONOSCOPY N/A 01/12/2022    COLORECTAL CANCER SCREENING, NOT HIGH RISK performed by Earnest Moon DO at [facility] ENDOSCOPY    COLONOSCOPY N/A 10/16/2023    COLONOSCOPY WITH BIOPSY performed by Mari Harrison MD at [facility] ENDOSCOPY    FRACTURE SURGERY Left     upper femur  (approx 2017)    HIP SURGERY Left 07/09/2016    IM NAILING LEFT HIP    TOTAL HIP ARTHROPLASTY Left 02/08/2022    CONVERSION OF PRIOR HIP SURGERY TO LEFT TOTAL HIP ARTHROPLASTY ROBOTIC ASSISTED CIARA performed by Bernardo Ochoa DO at [facility] OR    TUBAL LIGATION         Allergies:    Patient has no known allergies.    Social History:   Social History     Socioeconomic History    Marital status:      Spouse name: audrey    Number of children: 6    Years of education: Not on file    Highest education level: Not on file   Occupational History    Not on file   Tobacco Use    Smoking status: Every Day     Current packs/day: 1.00     Average packs/day: 1 pack/day for 10.0 years (10.0 ttl pk-yrs)     Types: Cigarettes    Smokeless

## 2025-05-02 ENCOUNTER — OFFICE VISIT (OUTPATIENT)
Dept: PRIMARY CARE CLINIC | Age: 50
End: 2025-05-02
Payer: COMMERCIAL

## 2025-05-02 VITALS
OXYGEN SATURATION: 100 % | TEMPERATURE: 97.3 F | HEIGHT: 64 IN | DIASTOLIC BLOOD PRESSURE: 80 MMHG | RESPIRATION RATE: 16 BRPM | HEART RATE: 118 BPM | WEIGHT: 124 LBS | BODY MASS INDEX: 21.17 KG/M2 | SYSTOLIC BLOOD PRESSURE: 136 MMHG

## 2025-05-02 DIAGNOSIS — G47.00 INSOMNIA, UNSPECIFIED TYPE: ICD-10-CM

## 2025-05-02 DIAGNOSIS — M25.562 ACUTE PAIN OF LEFT KNEE: Primary | ICD-10-CM

## 2025-05-02 PROCEDURE — G8420 CALC BMI NORM PARAMETERS: HCPCS | Performed by: STUDENT IN AN ORGANIZED HEALTH CARE EDUCATION/TRAINING PROGRAM

## 2025-05-02 PROCEDURE — 4004F PT TOBACCO SCREEN RCVD TLK: CPT | Performed by: STUDENT IN AN ORGANIZED HEALTH CARE EDUCATION/TRAINING PROGRAM

## 2025-05-02 PROCEDURE — 3017F COLORECTAL CA SCREEN DOC REV: CPT | Performed by: STUDENT IN AN ORGANIZED HEALTH CARE EDUCATION/TRAINING PROGRAM

## 2025-05-02 PROCEDURE — G8427 DOCREV CUR MEDS BY ELIG CLIN: HCPCS | Performed by: STUDENT IN AN ORGANIZED HEALTH CARE EDUCATION/TRAINING PROGRAM

## 2025-05-02 PROCEDURE — 99213 OFFICE O/P EST LOW 20 MIN: CPT | Performed by: STUDENT IN AN ORGANIZED HEALTH CARE EDUCATION/TRAINING PROGRAM

## 2025-05-02 PROCEDURE — G2211 COMPLEX E/M VISIT ADD ON: HCPCS | Performed by: STUDENT IN AN ORGANIZED HEALTH CARE EDUCATION/TRAINING PROGRAM

## 2025-05-02 SDOH — ECONOMIC STABILITY: FOOD INSECURITY: WITHIN THE PAST 12 MONTHS, THE FOOD YOU BOUGHT JUST DIDN'T LAST AND YOU DIDN'T HAVE MONEY TO GET MORE.: SOMETIMES TRUE

## 2025-05-02 SDOH — ECONOMIC STABILITY: FOOD INSECURITY: WITHIN THE PAST 12 MONTHS, YOU WORRIED THAT YOUR FOOD WOULD RUN OUT BEFORE YOU GOT MONEY TO BUY MORE.: NEVER TRUE

## 2025-05-02 ASSESSMENT — PATIENT HEALTH QUESTIONNAIRE - PHQ9
SUM OF ALL RESPONSES TO PHQ QUESTIONS 1-9: 7
7. TROUBLE CONCENTRATING ON THINGS, SUCH AS READING THE NEWSPAPER OR WATCHING TELEVISION: NOT AT ALL
SUM OF ALL RESPONSES TO PHQ QUESTIONS 1-9: 7
1. LITTLE INTEREST OR PLEASURE IN DOING THINGS: MORE THAN HALF THE DAYS
9. THOUGHTS THAT YOU WOULD BE BETTER OFF DEAD, OR OF HURTING YOURSELF: NOT AT ALL
SUM OF ALL RESPONSES TO PHQ QUESTIONS 1-9: 7
10. IF YOU CHECKED OFF ANY PROBLEMS, HOW DIFFICULT HAVE THESE PROBLEMS MADE IT FOR YOU TO DO YOUR WORK, TAKE CARE OF THINGS AT HOME, OR GET ALONG WITH OTHER PEOPLE: SOMEWHAT DIFFICULT
8. MOVING OR SPEAKING SO SLOWLY THAT OTHER PEOPLE COULD HAVE NOTICED. OR THE OPPOSITE, BEING SO FIGETY OR RESTLESS THAT YOU HAVE BEEN MOVING AROUND A LOT MORE THAN USUAL: NOT AT ALL
4. FEELING TIRED OR HAVING LITTLE ENERGY: SEVERAL DAYS
6. FEELING BAD ABOUT YOURSELF - OR THAT YOU ARE A FAILURE OR HAVE LET YOURSELF OR YOUR FAMILY DOWN: NOT AT ALL
3. TROUBLE FALLING OR STAYING ASLEEP: NEARLY EVERY DAY
2. FEELING DOWN, DEPRESSED OR HOPELESS: SEVERAL DAYS
SUM OF ALL RESPONSES TO PHQ QUESTIONS 1-9: 7
5. POOR APPETITE OR OVEREATING: NOT AT ALL

## 2025-05-21 ENCOUNTER — OFFICE VISIT (OUTPATIENT)
Dept: PRIMARY CARE CLINIC | Age: 50
End: 2025-05-21
Payer: COMMERCIAL

## 2025-05-21 VITALS
HEIGHT: 64 IN | OXYGEN SATURATION: 99 % | BODY MASS INDEX: 22.2 KG/M2 | RESPIRATION RATE: 18 BRPM | SYSTOLIC BLOOD PRESSURE: 150 MMHG | WEIGHT: 130 LBS | TEMPERATURE: 97.4 F | HEART RATE: 68 BPM | DIASTOLIC BLOOD PRESSURE: 96 MMHG

## 2025-05-21 DIAGNOSIS — I21.4 NSTEMI (NON-ST ELEVATED MYOCARDIAL INFARCTION) (HCC): ICD-10-CM

## 2025-05-21 DIAGNOSIS — G47.00 INSOMNIA, UNSPECIFIED TYPE: ICD-10-CM

## 2025-05-21 DIAGNOSIS — M25.562 ACUTE PAIN OF LEFT KNEE: ICD-10-CM

## 2025-05-21 DIAGNOSIS — I51.81 TAKOTSUBO CARDIOMYOPATHY: Primary | ICD-10-CM

## 2025-05-21 DIAGNOSIS — D50.9 IRON DEFICIENCY ANEMIA, UNSPECIFIED IRON DEFICIENCY ANEMIA TYPE: ICD-10-CM

## 2025-05-21 PROCEDURE — 4004F PT TOBACCO SCREEN RCVD TLK: CPT | Performed by: STUDENT IN AN ORGANIZED HEALTH CARE EDUCATION/TRAINING PROGRAM

## 2025-05-21 PROCEDURE — G8427 DOCREV CUR MEDS BY ELIG CLIN: HCPCS | Performed by: STUDENT IN AN ORGANIZED HEALTH CARE EDUCATION/TRAINING PROGRAM

## 2025-05-21 PROCEDURE — G8420 CALC BMI NORM PARAMETERS: HCPCS | Performed by: STUDENT IN AN ORGANIZED HEALTH CARE EDUCATION/TRAINING PROGRAM

## 2025-05-21 PROCEDURE — G2211 COMPLEX E/M VISIT ADD ON: HCPCS | Performed by: STUDENT IN AN ORGANIZED HEALTH CARE EDUCATION/TRAINING PROGRAM

## 2025-05-21 PROCEDURE — 3017F COLORECTAL CA SCREEN DOC REV: CPT | Performed by: STUDENT IN AN ORGANIZED HEALTH CARE EDUCATION/TRAINING PROGRAM

## 2025-05-21 PROCEDURE — 99214 OFFICE O/P EST MOD 30 MIN: CPT | Performed by: STUDENT IN AN ORGANIZED HEALTH CARE EDUCATION/TRAINING PROGRAM

## 2025-05-21 RX ORDER — ATORVASTATIN CALCIUM 40 MG/1
40 TABLET, FILM COATED ORAL NIGHTLY
Qty: 30 TABLET | Refills: 3 | Status: SHIPPED | OUTPATIENT
Start: 2025-05-21

## 2025-05-21 RX ORDER — SPIRONOLACTONE 25 MG/1
25 TABLET ORAL DAILY
Qty: 30 TABLET | Refills: 3 | Status: SHIPPED | OUTPATIENT
Start: 2025-05-21

## 2025-05-21 RX ORDER — ASPIRIN 81 MG/1
81 TABLET, CHEWABLE ORAL DAILY
Qty: 30 TABLET | Refills: 3 | Status: SHIPPED | OUTPATIENT
Start: 2025-05-21

## 2025-05-21 RX ORDER — FERROUS SULFATE 325(65) MG
325 TABLET ORAL
Qty: 90 TABLET | Refills: 1 | Status: SHIPPED | OUTPATIENT
Start: 2025-05-21

## 2025-05-21 RX ORDER — METOPROLOL SUCCINATE 25 MG/1
25 TABLET, EXTENDED RELEASE ORAL DAILY
Qty: 30 TABLET | Refills: 3 | Status: SHIPPED | OUTPATIENT
Start: 2025-05-21

## 2025-05-21 RX ORDER — IBUPROFEN 600 MG/1
600 TABLET, FILM COATED ORAL 3 TIMES DAILY PRN
Qty: 30 TABLET | Refills: 0 | Status: CANCELLED | OUTPATIENT
Start: 2025-05-21

## 2025-05-21 RX ORDER — MIRTAZAPINE 7.5 MG/1
7.5 TABLET, FILM COATED ORAL NIGHTLY
Qty: 90 TABLET | Refills: 1 | Status: SHIPPED | OUTPATIENT
Start: 2025-05-21

## 2025-05-21 RX ORDER — ACETAMINOPHEN 500 MG
1000 TABLET ORAL 3 TIMES DAILY PRN
Qty: 540 TABLET | Refills: 1 | Status: SHIPPED | OUTPATIENT
Start: 2025-05-21

## 2025-05-21 NOTE — PROGRESS NOTES
[facility] Primary Care  Department of Family Medicine      Patient:  Ingrid Hui 50 y.o. female     Date of Service: 5/21/25      Chief complaint:   Chief Complaint   Patient presents with    2 Week Follow-Up    Insomnia    Knee Pain         History ofPresent Illness   The patient is a 50 y.o. female  presented to the clinic with complaints as above.    Takosubo cardiomyopathy  -f/u  -stopped her meds as she ran out  -denies any chest pain or SOB   -having swelling in left leg after her fall     Left knee pain  -f/u  -ordered MRI, has this scheduled for June 4th  -currently, pain is not getting any better     Having issues with sleep still, melatonin helps get her to sleep but not keep her asleep     Past Medical History:      Diagnosis Date    Chronic hip pain     H/O non-ST elevation myocardial infarction (NSTEMI) 06/15/2023    follows with Dr Kaur every 3 mos  .pt reports  next appt 10/27/23    Urinary incontinence        PastSurgical History:        Procedure Laterality Date    CARDIAC CATHETERIZATION  06/13/2023    COLONOSCOPY N/A 01/12/2022    COLORECTAL CANCER SCREENING, NOT HIGH RISK performed by Earnest Moon DO at [facility] ENDOSCOPY    COLONOSCOPY N/A 10/16/2023    COLONOSCOPY WITH BIOPSY performed by Mari Harrison MD at [facility] ENDOSCOPY    FRACTURE SURGERY Left     upper femur  (approx 2017)    HIP SURGERY Left 07/09/2016    IM NAILING LEFT HIP    TOTAL HIP ARTHROPLASTY Left 02/08/2022    CONVERSION OF PRIOR HIP SURGERY TO LEFT TOTAL HIP ARTHROPLASTY ROBOTIC ASSISTED CIARA performed by Bernadro Ochoa DO at [facility] OR    TUBAL LIGATION         Allergies:    Patient has no known allergies.    Social History:   Social History     Socioeconomic History    Marital status:      Spouse name: audrey    Number of children: 6    Years of education: Not on file    Highest education level: Not on file   Occupational History    Not on file   Tobacco Use    Smoking status: Every

## 2025-06-04 ENCOUNTER — HOSPITAL ENCOUNTER (OUTPATIENT)
Dept: MRI IMAGING | Age: 50
Discharge: HOME | End: 2025-06-06
Attending: STUDENT IN AN ORGANIZED HEALTH CARE EDUCATION/TRAINING PROGRAM
Payer: COMMERCIAL

## 2025-06-04 DIAGNOSIS — M25.562 ACUTE PAIN OF LEFT KNEE: ICD-10-CM

## 2025-06-04 PROCEDURE — 99285 EMERGENCY DEPT VISIT HI MDM: CPT

## 2025-06-04 PROCEDURE — 73721 MRI JNT OF LWR EXTRE W/O DYE: CPT

## 2025-06-04 ASSESSMENT — LIFESTYLE VARIABLES
HOW OFTEN DO YOU HAVE A DRINK CONTAINING ALCOHOL: MONTHLY OR LESS
HOW MANY STANDARD DRINKS CONTAINING ALCOHOL DO YOU HAVE ON A TYPICAL DAY: 1 OR 2

## 2025-06-04 ASSESSMENT — PAIN SCALES - GENERAL: PAINLEVEL_OUTOF10: 7

## 2025-06-04 ASSESSMENT — PAIN DESCRIPTION - LOCATION: LOCATION: LEG

## 2025-06-04 ASSESSMENT — PAIN DESCRIPTION - ORIENTATION: ORIENTATION: LEFT

## 2025-06-05 ENCOUNTER — APPOINTMENT (OUTPATIENT)
Dept: ULTRASOUND IMAGING | Age: 50
End: 2025-06-05
Payer: COMMERCIAL

## 2025-06-05 ENCOUNTER — APPOINTMENT (OUTPATIENT)
Dept: GENERAL RADIOLOGY | Age: 50
End: 2025-06-05
Payer: COMMERCIAL

## 2025-06-05 ENCOUNTER — HOSPITAL ENCOUNTER (OUTPATIENT)
Age: 50
Setting detail: OBSERVATION
Discharge: HOME | End: 2025-06-06
Attending: STUDENT IN AN ORGANIZED HEALTH CARE EDUCATION/TRAINING PROGRAM | Admitting: FAMILY MEDICINE
Payer: COMMERCIAL

## 2025-06-05 ENCOUNTER — APPOINTMENT (OUTPATIENT)
Age: 50
End: 2025-06-05
Payer: COMMERCIAL

## 2025-06-05 DIAGNOSIS — S72.142S CLOSED INTERTROCHANTERIC FRACTURE OF HIP, LEFT, SEQUELA: ICD-10-CM

## 2025-06-05 DIAGNOSIS — M79.89 LEFT LEG SWELLING: ICD-10-CM

## 2025-06-05 DIAGNOSIS — S82.132A CLOSED FRACTURE OF MEDIAL PORTION OF LEFT TIBIAL PLATEAU, INITIAL ENCOUNTER: ICD-10-CM

## 2025-06-05 DIAGNOSIS — I42.9 CARDIOMYOPATHY, UNSPECIFIED TYPE (HCC): ICD-10-CM

## 2025-06-05 DIAGNOSIS — R26.2 DIFFICULTY IN WALKING: Primary | ICD-10-CM

## 2025-06-05 PROBLEM — I82.402 ACUTE DEEP VEIN THROMBOSIS (DVT) OF LEFT LOWER EXTREMITY (HCC): Status: ACTIVE | Noted: 2025-06-05

## 2025-06-05 LAB
ANION GAP SERPL CALCULATED.3IONS-SCNC: 14 MMOL/L (ref 7–16)
BASOPHILS # BLD: 0 K/UL (ref 0–0.2)
BASOPHILS NFR BLD: 0 % (ref 0–2)
BNP SERPL-MCNC: 342 PG/ML (ref 0–125)
BUN SERPL-MCNC: 8 MG/DL (ref 6–20)
CALCIUM SERPL-MCNC: 9.3 MG/DL (ref 8.6–10.2)
CHLORIDE SERPL-SCNC: 102 MMOL/L (ref 98–107)
CK SERPL-CCNC: 157 U/L (ref 20–180)
CO2 SERPL-SCNC: 22 MMOL/L (ref 22–29)
CREAT SERPL-MCNC: 0.9 MG/DL (ref 0.5–1)
CRP SERPL HS-MCNC: <3 MG/L (ref 0–5)
ECHO AO ASC DIAM: 3 CM
ECHO AV ANNULUS DIAM: 3 CM
ECHO AV AREA PEAK VELOCITY: 2.1 CM2
ECHO AV AREA VTI: 2.1 CM2
ECHO AV CUSP MM: 2 CM
ECHO AV MEAN GRADIENT: 4 MMHG
ECHO AV MEAN VELOCITY: 1 M/S
ECHO AV PEAK GRADIENT: 7 MMHG
ECHO AV PEAK VELOCITY: 1.3 M/S
ECHO AV VELOCITY RATIO: 0.69
ECHO AV VTI: 25.9 CM
ECHO EST RA PRESSURE: 3 MMHG
ECHO LA DIAMETER: 3 CM
ECHO LA VOL A-L A2C: 57 ML (ref 22–52)
ECHO LA VOL A-L A4C: 49 ML (ref 22–52)
ECHO LA VOL MOD A2C: 54 ML (ref 22–52)
ECHO LA VOL MOD A4C: 46 ML (ref 22–52)
ECHO LA VOLUME AREA LENGTH: 55 ML
ECHO LV E' LATERAL VELOCITY: 7 CM/S
ECHO LV E' SEPTAL VELOCITY: 6 CM/S
ECHO LV EF PHYSICIAN: 45 %
ECHO LV EJECTION FRACTION A2C: 53 %
ECHO LV EJECTION FRACTION A4C: 54 %
ECHO LV ESV A2C: 49 ML
ECHO LV ESV A4C: 58 ML
ECHO LV FRACTIONAL SHORTENING: 26 % (ref 28–44)
ECHO LV INTERNAL DIMENSION DIASTOLIC: 5 CM (ref 3.9–5.3)
ECHO LV INTERNAL DIMENSION SYSTOLIC: 3.7 CM
ECHO LV ISOVOLUMETRIC RELAXATION TIME (IVRT): 106.1 MS
ECHO LV IVSD: 0.7 CM (ref 0.6–0.9)
ECHO LV IVSS: 1.2 CM
ECHO LV MASS 2D: 135.8 G (ref 67–162)
ECHO LV POSTERIOR WALL DIASTOLIC: 0.9 CM (ref 0.6–0.9)
ECHO LV POSTERIOR WALL SYSTOLIC: 1.7 CM
ECHO LV RELATIVE WALL THICKNESS RATIO: 0.36
ECHO LVOT AREA: 3.1 CM2
ECHO LVOT AV VTI INDEX: 0.7
ECHO LVOT DIAM: 2 CM
ECHO LVOT MEAN GRADIENT: 2 MMHG
ECHO LVOT PEAK GRADIENT: 3 MMHG
ECHO LVOT PEAK VELOCITY: 0.9 M/S
ECHO LVOT SV: 56.8 ML
ECHO LVOT VTI: 18.1 CM
ECHO MV "A" WAVE DURATION: 138.4 MSEC
ECHO MV A VELOCITY: 0.72 M/S
ECHO MV AREA PHT: 2.9 CM2
ECHO MV AREA VTI: 2.2 CM2
ECHO MV E DECELERATION TIME (DT): 235.7 MS
ECHO MV E VELOCITY: 0.98 M/S
ECHO MV E/A RATIO: 1.36
ECHO MV E/E' LATERAL: 14
ECHO MV E/E' RATIO (AVERAGED): 15.17
ECHO MV E/E' SEPTAL: 16.33
ECHO MV LVOT VTI INDEX: 1.46
ECHO MV MAX VELOCITY: 0.9 M/S
ECHO MV MEAN GRADIENT: 2 MMHG
ECHO MV MEAN VELOCITY: 0.6 M/S
ECHO MV PEAK GRADIENT: 3 MMHG
ECHO MV PRESSURE HALF TIME (PHT): 76.8 MS
ECHO MV VTI: 26.4 CM
ECHO PV MAX VELOCITY: 0.6 M/S
ECHO PV MEAN GRADIENT: 1 MMHG
ECHO PV MEAN VELOCITY: 0.5 M/S
ECHO PV PEAK GRADIENT: 2 MMHG
ECHO PV VTI: 12.1 CM
ECHO PVEIN A DURATION: 147.6 MS
ECHO PVEIN A VELOCITY: 0.3 M/S
ECHO PVEIN PEAK D VELOCITY: 0.4 M/S
ECHO PVEIN PEAK S VELOCITY: 0.6 M/S
ECHO PVEIN S/D RATIO: 1.5
ECHO RIGHT VENTRICULAR SYSTOLIC PRESSURE (RVSP): 26 MMHG
ECHO RV BASAL DIMENSION: 3.5 CM
ECHO RV INTERNAL DIMENSION: 2.8 CM
ECHO RV LONGITUDINAL DIMENSION: 6.7 CM
ECHO RV MID DIMENSION: 2.9 CM
ECHO RV TAPSE: 1.8 CM (ref 1.7–?)
ECHO TV REGURGITANT MAX VELOCITY: 2.38 M/S
ECHO TV REGURGITANT PEAK GRADIENT: 23 MMHG
EOSINOPHIL # BLD: 0.16 K/UL (ref 0.05–0.5)
EOSINOPHILS RELATIVE PERCENT: 3 % (ref 0–6)
ERYTHROCYTE [DISTWIDTH] IN BLOOD BY AUTOMATED COUNT: 18.7 % (ref 11.5–15)
ERYTHROCYTE [SEDIMENTATION RATE] IN BLOOD BY WESTERGREN METHOD: 40 MM/HR (ref 0–20)
GFR, ESTIMATED: 79 ML/MIN/1.73M2
GLUCOSE SERPL-MCNC: 92 MG/DL (ref 74–99)
HCT VFR BLD AUTO: 28.4 % (ref 34–48)
HGB BLD-MCNC: 8.6 G/DL (ref 11.5–15.5)
INR PPP: 1.1
LYMPHOCYTES NFR BLD: 1.81 K/UL (ref 1.5–4)
LYMPHOCYTES RELATIVE PERCENT: 31 % (ref 20–42)
MCH RBC QN AUTO: 22.1 PG (ref 26–35)
MCHC RBC AUTO-ENTMCNC: 30.3 G/DL (ref 32–34.5)
MCV RBC AUTO: 73 FL (ref 80–99.9)
MONOCYTES NFR BLD: 0.69 K/UL (ref 0.1–0.95)
MONOCYTES NFR BLD: 12 % (ref 2–12)
NEUTROPHILS NFR BLD: 55 % (ref 43–80)
NEUTS SEG NFR BLD: 3.24 K/UL (ref 1.8–7.3)
NUCLEATED RED BLOOD CELLS: 1 PER 100 WBC
PARTIAL THROMBOPLASTIN TIME: 32.8 SEC (ref 24.5–35.1)
PLATELET # BLD AUTO: 383 K/UL (ref 130–450)
PMV BLD AUTO: 8.8 FL (ref 7–12)
POTASSIUM SERPL-SCNC: 3.8 MMOL/L (ref 3.5–5)
PROTHROMBIN TIME: 11.7 SEC (ref 9.3–12.4)
RBC # BLD AUTO: 3.89 M/UL (ref 3.5–5.5)
RBC # BLD: ABNORMAL 10*6/UL
SODIUM SERPL-SCNC: 138 MMOL/L (ref 132–146)
WBC OTHER # BLD: 5.9 K/UL (ref 4.5–11.5)

## 2025-06-05 PROCEDURE — 6370000000 HC RX 637 (ALT 250 FOR IP)

## 2025-06-05 PROCEDURE — 85025 COMPLETE CBC W/AUTO DIFF WBC: CPT

## 2025-06-05 PROCEDURE — 73590 X-RAY EXAM OF LOWER LEG: CPT

## 2025-06-05 PROCEDURE — 85652 RBC SED RATE AUTOMATED: CPT

## 2025-06-05 PROCEDURE — 80048 BASIC METABOLIC PNL TOTAL CA: CPT

## 2025-06-05 PROCEDURE — 99221 1ST HOSP IP/OBS SF/LOW 40: CPT | Performed by: FAMILY MEDICINE

## 2025-06-05 PROCEDURE — G0378 HOSPITAL OBSERVATION PER HR: HCPCS

## 2025-06-05 PROCEDURE — 85610 PROTHROMBIN TIME: CPT

## 2025-06-05 PROCEDURE — 6370000000 HC RX 637 (ALT 250 FOR IP): Performed by: STUDENT IN AN ORGANIZED HEALTH CARE EDUCATION/TRAINING PROGRAM

## 2025-06-05 PROCEDURE — 83880 ASSAY OF NATRIURETIC PEPTIDE: CPT

## 2025-06-05 PROCEDURE — 93306 TTE W/DOPPLER COMPLETE: CPT

## 2025-06-05 PROCEDURE — 82550 ASSAY OF CK (CPK): CPT

## 2025-06-05 PROCEDURE — 93306 TTE W/DOPPLER COMPLETE: CPT | Performed by: INTERNAL MEDICINE

## 2025-06-05 PROCEDURE — 73610 X-RAY EXAM OF ANKLE: CPT

## 2025-06-05 PROCEDURE — 93971 EXTREMITY STUDY: CPT

## 2025-06-05 PROCEDURE — 6360000002 HC RX W HCPCS

## 2025-06-05 PROCEDURE — 85730 THROMBOPLASTIN TIME PARTIAL: CPT

## 2025-06-05 PROCEDURE — 2500000003 HC RX 250 WO HCPCS

## 2025-06-05 PROCEDURE — 96372 THER/PROPH/DIAG INJ SC/IM: CPT

## 2025-06-05 PROCEDURE — 86140 C-REACTIVE PROTEIN: CPT

## 2025-06-05 RX ORDER — MIRTAZAPINE 15 MG/1
7.5 TABLET, FILM COATED ORAL NIGHTLY
Status: DISCONTINUED | OUTPATIENT
Start: 2025-06-05 | End: 2025-06-06 | Stop reason: HOSPADM

## 2025-06-05 RX ORDER — ATORVASTATIN CALCIUM 40 MG/1
40 TABLET, FILM COATED ORAL NIGHTLY
Status: DISCONTINUED | OUTPATIENT
Start: 2025-06-05 | End: 2025-06-06 | Stop reason: HOSPADM

## 2025-06-05 RX ORDER — NICOTINE 21 MG/24HR
1 PATCH, TRANSDERMAL 24 HOURS TRANSDERMAL DAILY
Status: DISCONTINUED | OUTPATIENT
Start: 2025-06-05 | End: 2025-06-06 | Stop reason: HOSPADM

## 2025-06-05 RX ORDER — SODIUM CHLORIDE 9 MG/ML
INJECTION, SOLUTION INTRAVENOUS PRN
Status: DISCONTINUED | OUTPATIENT
Start: 2025-06-05 | End: 2025-06-06 | Stop reason: HOSPADM

## 2025-06-05 RX ORDER — SODIUM CHLORIDE 0.9 % (FLUSH) 0.9 %
5-40 SYRINGE (ML) INJECTION EVERY 12 HOURS SCHEDULED
Status: DISCONTINUED | OUTPATIENT
Start: 2025-06-05 | End: 2025-06-06 | Stop reason: HOSPADM

## 2025-06-05 RX ORDER — METOPROLOL SUCCINATE 25 MG/1
25 TABLET, EXTENDED RELEASE ORAL DAILY
Status: DISCONTINUED | OUTPATIENT
Start: 2025-06-05 | End: 2025-06-06 | Stop reason: HOSPADM

## 2025-06-05 RX ORDER — SPIRONOLACTONE 25 MG/1
25 TABLET ORAL DAILY
Status: DISCONTINUED | OUTPATIENT
Start: 2025-06-05 | End: 2025-06-06 | Stop reason: HOSPADM

## 2025-06-05 RX ORDER — SODIUM CHLORIDE 0.9 % (FLUSH) 0.9 %
5-40 SYRINGE (ML) INJECTION PRN
Status: DISCONTINUED | OUTPATIENT
Start: 2025-06-05 | End: 2025-06-06 | Stop reason: HOSPADM

## 2025-06-05 RX ORDER — ONDANSETRON 2 MG/ML
4 INJECTION INTRAMUSCULAR; INTRAVENOUS EVERY 6 HOURS PRN
Status: DISCONTINUED | OUTPATIENT
Start: 2025-06-05 | End: 2025-06-06 | Stop reason: HOSPADM

## 2025-06-05 RX ORDER — VALSARTAN 40 MG/1
40 TABLET ORAL DAILY
Status: DISCONTINUED | OUTPATIENT
Start: 2025-06-05 | End: 2025-06-06 | Stop reason: HOSPADM

## 2025-06-05 RX ORDER — ACETAMINOPHEN 650 MG/1
650 SUPPOSITORY RECTAL EVERY 6 HOURS PRN
Status: DISCONTINUED | OUTPATIENT
Start: 2025-06-05 | End: 2025-06-05

## 2025-06-05 RX ORDER — ENOXAPARIN SODIUM 100 MG/ML
40 INJECTION SUBCUTANEOUS DAILY
Status: DISCONTINUED | OUTPATIENT
Start: 2025-06-05 | End: 2025-06-05

## 2025-06-05 RX ORDER — OXYCODONE AND ACETAMINOPHEN 5; 325 MG/1; MG/1
1 TABLET ORAL ONCE
Refills: 0 | Status: COMPLETED | OUTPATIENT
Start: 2025-06-05 | End: 2025-06-05

## 2025-06-05 RX ORDER — ENOXAPARIN SODIUM 100 MG/ML
1 INJECTION SUBCUTANEOUS EVERY 12 HOURS
Status: DISCONTINUED | OUTPATIENT
Start: 2025-06-05 | End: 2025-06-06 | Stop reason: HOSPADM

## 2025-06-05 RX ORDER — OXYCODONE AND ACETAMINOPHEN 5; 325 MG/1; MG/1
1 TABLET ORAL EVERY 6 HOURS PRN
Status: DISCONTINUED | OUTPATIENT
Start: 2025-06-05 | End: 2025-06-06 | Stop reason: HOSPADM

## 2025-06-05 RX ORDER — POLYETHYLENE GLYCOL 3350 17 G/17G
17 POWDER, FOR SOLUTION ORAL DAILY PRN
Status: DISCONTINUED | OUTPATIENT
Start: 2025-06-05 | End: 2025-06-06 | Stop reason: HOSPADM

## 2025-06-05 RX ORDER — ONDANSETRON 4 MG/1
4 TABLET, ORALLY DISINTEGRATING ORAL EVERY 8 HOURS PRN
Status: DISCONTINUED | OUTPATIENT
Start: 2025-06-05 | End: 2025-06-06 | Stop reason: HOSPADM

## 2025-06-05 RX ORDER — FERROUS SULFATE 325(65) MG
325 TABLET ORAL
Status: DISCONTINUED | OUTPATIENT
Start: 2025-06-05 | End: 2025-06-06 | Stop reason: HOSPADM

## 2025-06-05 RX ORDER — ACETAMINOPHEN 500 MG
1000 TABLET ORAL 3 TIMES DAILY PRN
Status: DISCONTINUED | OUTPATIENT
Start: 2025-06-05 | End: 2025-06-06 | Stop reason: HOSPADM

## 2025-06-05 RX ORDER — ASCORBIC ACID 500 MG
250 TABLET ORAL DAILY
Status: DISCONTINUED | OUTPATIENT
Start: 2025-06-05 | End: 2025-06-06 | Stop reason: HOSPADM

## 2025-06-05 RX ORDER — POLYETHYLENE GLYCOL 3350 17 G/17G
17 POWDER, FOR SOLUTION ORAL DAILY
Status: DISCONTINUED | OUTPATIENT
Start: 2025-06-05 | End: 2025-06-06 | Stop reason: HOSPADM

## 2025-06-05 RX ORDER — ACETAMINOPHEN 325 MG/1
650 TABLET ORAL EVERY 6 HOURS PRN
Status: DISCONTINUED | OUTPATIENT
Start: 2025-06-05 | End: 2025-06-05

## 2025-06-05 RX ADMIN — OXYCODONE HYDROCHLORIDE AND ACETAMINOPHEN 250 MG: 500 TABLET ORAL at 10:17

## 2025-06-05 RX ADMIN — MIRTAZAPINE 7.5 MG: 15 TABLET, FILM COATED ORAL at 20:44

## 2025-06-05 RX ADMIN — FERROUS SULFATE TAB 325 MG (65 MG ELEMENTAL FE) 325 MG: 325 (65 FE) TAB at 10:30

## 2025-06-05 RX ADMIN — ENOXAPARIN SODIUM 60 MG: 100 INJECTION SUBCUTANEOUS at 10:31

## 2025-06-05 RX ADMIN — ACETAMINOPHEN 1000 MG: 500 TABLET ORAL at 20:44

## 2025-06-05 RX ADMIN — SODIUM CHLORIDE, PRESERVATIVE FREE 10 ML: 5 INJECTION INTRAVENOUS at 10:32

## 2025-06-05 RX ADMIN — OXYCODONE AND ACETAMINOPHEN 1 TABLET: 5; 325 TABLET ORAL at 10:16

## 2025-06-05 RX ADMIN — VALSARTAN 40 MG: 40 TABLET ORAL at 16:47

## 2025-06-05 RX ADMIN — ENOXAPARIN SODIUM 60 MG: 100 INJECTION SUBCUTANEOUS at 20:44

## 2025-06-05 RX ADMIN — OXYCODONE AND ACETAMINOPHEN 1 TABLET: 5; 325 TABLET ORAL at 23:07

## 2025-06-05 RX ADMIN — ATORVASTATIN CALCIUM 40 MG: 40 TABLET, FILM COATED ORAL at 20:44

## 2025-06-05 RX ADMIN — POLYETHYLENE GLYCOL 3350 17 G: 17 POWDER, FOR SOLUTION ORAL at 10:30

## 2025-06-05 RX ADMIN — OXYCODONE AND ACETAMINOPHEN 1 TABLET: 5; 325 TABLET ORAL at 03:20

## 2025-06-05 RX ADMIN — SODIUM CHLORIDE, PRESERVATIVE FREE 10 ML: 5 INJECTION INTRAVENOUS at 20:44

## 2025-06-05 RX ADMIN — SPIRONOLACTONE 25 MG: 25 TABLET ORAL at 10:17

## 2025-06-05 RX ADMIN — OXYCODONE AND ACETAMINOPHEN 1 TABLET: 5; 325 TABLET ORAL at 16:47

## 2025-06-05 RX ADMIN — METOPROLOL SUCCINATE 25 MG: 25 TABLET, EXTENDED RELEASE ORAL at 10:17

## 2025-06-05 RX ADMIN — Medication 3 MG: at 20:44

## 2025-06-05 ASSESSMENT — PAIN DESCRIPTION - FREQUENCY: FREQUENCY: CONTINUOUS

## 2025-06-05 ASSESSMENT — PAIN SCALES - GENERAL
PAINLEVEL_OUTOF10: 5
PAINLEVEL_OUTOF10: 7
PAINLEVEL_OUTOF10: 8
PAINLEVEL_OUTOF10: 7
PAINLEVEL_OUTOF10: 4
PAINLEVEL_OUTOF10: 8
PAINLEVEL_OUTOF10: 4
PAINLEVEL_OUTOF10: 8
PAINLEVEL_OUTOF10: 5

## 2025-06-05 ASSESSMENT — PAIN DESCRIPTION - LOCATION
LOCATION: LEG;KNEE
LOCATION: LEG;KNEE
LOCATION: KNEE
LOCATION: LEG

## 2025-06-05 ASSESSMENT — PAIN - FUNCTIONAL ASSESSMENT
PAIN_FUNCTIONAL_ASSESSMENT: 0-10
PAIN_FUNCTIONAL_ASSESSMENT: PREVENTS OR INTERFERES WITH ALL ACTIVE AND SOME PASSIVE ACTIVITIES

## 2025-06-05 ASSESSMENT — PAIN DESCRIPTION - PAIN TYPE
TYPE: ACUTE PAIN
TYPE: ACUTE PAIN
TYPE: CHRONIC PAIN;ACUTE PAIN

## 2025-06-05 ASSESSMENT — PAIN DESCRIPTION - ORIENTATION
ORIENTATION: LEFT

## 2025-06-05 ASSESSMENT — PAIN DESCRIPTION - DESCRIPTORS
DESCRIPTORS: THROBBING;DISCOMFORT;ACHING
DESCRIPTORS: THROBBING
DESCRIPTORS: STABBING
DESCRIPTORS: THROBBING
DESCRIPTORS: THROBBING;DISCOMFORT;ACHING
DESCRIPTORS: THROBBING

## 2025-06-05 ASSESSMENT — PAIN DESCRIPTION - ONSET: ONSET: ON-GOING

## 2025-06-05 NOTE — PROGRESS NOTES
Database initiated. Patient is A&O comes in from home. States she uses crutches and is RA at baseline.she has fallen recently.

## 2025-06-05 NOTE — CARE COORDINATION
Introduced my self and provided explanation of CM role to patient. Admitted for left leg swelling. Patient had mechanical fall about a month ago, MRI on 5/21 showed poss ACL tear, hx of left ROBEL. Ortho Surgery cx pending, await plan. She voices she resides in a 2 story  home with b/b 2nd floor with her spouse and completes her adl's with independence. Currently using crutches to ambulate. She has ww, shower chair. Hx of [facility] cannot recall the name and hx with [facility]. Patient is established with Dr. Hoskins. PT/OT evals pending, if rehab is required patient prefers to go OP.  SW consult noted for help with medication costs, patient provided with resources for prescription assistance program. Will continue to follow.  Yin FIELDN, RN  Case Management

## 2025-06-05 NOTE — PROGRESS NOTES
[facility]  Progress Note    Chief complaint :  Chief Complaint   Patient presents with    Leg Pain     Left, s/p injury x1 month ago, had MRI done today.        Subjective:    No overnight problems.   Patient mentions that she has been having the left selling since the fall that has only been worsening in severity with no specific aggravating or relieving factors.She mentions that she noticed a wound on the leg but denies any fevers, chills. Patient also mentions pain with moving the leg but mostly around the knee. She has been able to ambulate with crutches. Denies any use of crutches, h/o DVT/PE in the past or any history of hypercoagulable disorder in the family.   Patient denies chest pain, SOB, nausea, vomiting, bloody stools, fever, chills, changes in urination, changes in BM.   Past medical, surgical, family and social history were reviewed, non-contributory, and unchanged unless otherwise stated.    Review of Systems  Negative unless stated otherwise     Objective:  BP (!) 173/109   Pulse 88   Temp 98.2 °F (36.8 °C) (Oral)   Resp 18   Ht 1.626 m (5' 4\")   Wt 59 kg (130 lb)   SpO2 98%   BMI 22.31 kg/m²     Physical Exam    Vitals reviewed.   Constitutional:       General: She is not in acute distress.  HENT:      Head: Normocephalic and atraumatic.      Right Ear: External ear normal.      Left Ear: External ear normal.      Nose: No congestion or rhinorrhea.      Mouth/Throat:      Pharynx: No posterior oropharyngeal erythema.   Eyes:      Conjunctiva/sclera: Conjunctivae normal.      Pupils: Pupils are equal, round, and reactive to light.   Cardiovascular:      Rate and Rhythm: Normal rate and regular rhythm.      Pulses: Normal pulses.      Heart sounds: Normal heart sounds.   Pulmonary:      Effort: Pulmonary effort is normal.      Breath sounds: Normal breath sounds. No wheezing.   Abdominal:      General: Abdomen is flat.      Palpations: Abdomen is soft.  pressure reading  Likely 2/2 to pain, communicated with nursing to repeat BP  Monitor for now  If remains elevated consider starting amlodipine 5 mg daily     CAD  Hx of NSTEMI, S/p LHC in 2023 with findings of takotsubo cardiomyopathy, moderate single vessel (LAD with 50%) stenosis, EF 40-45%. Entresto 24-26 mg and atorvastatin on file, prescribed on 5/21/25 by PCP however patient states she could not afford them and her insurance will not cover. Aspirin on med list but patient states she was told to stop it.   Continue home metoprolol 25 mg XL daily  Continue home spironolactone 25 mg daily  Continue Entresto 24-26 mg BID  Social work consult: to help with financial constraints        Iron deficiency anemia  Hb 8.6, MCV 73. Last iron profile on 6/15/23 with iron 18 (L), TIBC 335, iron sat 5% (L).  Continue home ferrous sulfate 325 mg daily  Added vitamin C 250 mg daily for better absorption     Insomnia  Continue home remeron 7.5 mg nightly  Continue melatonin nightly     Tobacco abuse  Patient continues to smoke 1 pack of cigarettes daily.   Counseling, would benefit from OP nicotine replacement        Code Status: full  Diet: regular  DVT ppx: lovenox  GI ppx: none     Consults: none    Quan Lowe MD   Family Medicine Resident PGY-1  06/05/25   7:28 AM

## 2025-06-05 NOTE — ED NOTES
ED to Inpatient Handoff Report    Notified omar that electronic handoff available and patient ready for transport to room 601.    Safety Risks: None identified    Patient in Restraints: no    Constant Observer or Patient : no    Telemetry Monitoring Ordered :Yes           Order to transfer to unit without monitor:YES    Last MEWS: 1 Time completed: 0820    Deterioration Index Score:   Predictive Model Details          18 (Normal)  Factor Value    Calculated 6/5/2025 08:23 57% Age 50 years old    Deterioration Index Model 18% Systolic 152     16% Respiratory rate 18     6% Hematocrit abnormal (28.4 %)     1% Potassium 3.8 mmol/L     1% Sodium 138 mmol/L     0% WBC count 5.9 k/uL     0% Pulse oximetry 96 %     0% Temperature 98.1 °F (36.7 °C)     0% Pulse 74        Vitals:    06/04/25 2329 06/04/25 2338 06/05/25 0820   BP:  (!) 173/109 (!) 152/94   Pulse:  88 74   Resp:  18 18   Temp:  98.2 °F (36.8 °C) 98.1 °F (36.7 °C)   TempSrc:  Oral Oral   SpO2:  98% 96%   Weight: 59 kg (130 lb)     Height: 1.626 m (5' 4\")           Opportunity for questions and clarification was provided.

## 2025-06-05 NOTE — ED PROVIDER NOTES
Independent DARIO Visit.      [facility]  Department of Emergency Medicine   ED  Encounter Note  Admit Date/RoomTime: 2025 12:11 AM  ED Room:     NAME: Ingrid Hui  : 1975  MRN: [id number]     Chief Complaint:  Leg Pain (Left, s/p injury x1 month ago, had MRI done today. )    History of Present Illness       Ingrid Hui is a 50 y.o. old female who presents to the emergency department by private vehicle, for traumatic Left knee pain which occured 1 month(s) prior to arrival.  Patient reports she fell down some stairs.  She was seen at that time and had x-ray of her knee and ultrasound of her lower extremity which were both negative.  She has been using crutches since then.  She reports since the fall she has not been able to bear any weight on her foot, due to pain in the knee however now she has developed significant swelling in the lower leg and when she tries to put weight on that foot the pain is in her knee and her shin and in her foot and ankle.  Patient denies chest pain or shortness of breath.  Patient holds her foot in a plantarflexed flexed position.  She is barely able to dorsiflex her foot.  Patient reports that is normal for her as she has a history of hip dysplasia and reports she always sort of walks on her toes on that foot.  Patient had MRI today, the MRI is in the chart but not read by radiology at this time.    ROS   Pertinent positives and negatives are stated within HPI, all other systems reviewed and are negative.    Past Medical History:  has a past medical history of Chronic hip pain, H/O non-ST elevation myocardial infarction (NSTEMI), and Urinary incontinence.    Surgical History:  has a past surgical history that includes Tubal ligation; hip surgery (Left, 2016); Colonoscopy (N/A, 2022); fracture surgery (Left); Total hip arthroplasty (Left, 2022); Cardiac catheterization (2023); and Colonoscopy (N/A, 10/16/2023).    Social  History:  reports that she has been smoking cigarettes. She has a 10 pack-year smoking history. She has never used smokeless tobacco. She reports that she does not drink alcohol and does not use drugs.    Family History: family history is not on file.     Allergies: Patient has no known allergies.    Physical Exam   Oxygen Saturation Interpretation: Normal.        ED Triage Vitals   BP Systolic BP Percentile Diastolic BP Percentile Temp Temp Source Pulse Respirations SpO2   06/04/25 2338 -- -- 06/04/25 2338 06/04/25 2338 06/04/25 2338 06/04/25 2338 06/04/25 2338   (!) 173/109   98.2 °F (36.8 °C) Oral 88 18 98 %      Height Weight - Scale         06/04/25 2329 06/04/25 2329         1.626 m (5' 4\") 59 kg (130 lb)               Constitutional:  Alert, development consistent with age.  HEENT:  NC/NT.  Airway patent.  Oral mucosa moist.  Cardiac: Heart regular rate and rhythm  Pulmonary: Lungs clear to auscultation bilaterally.  Neck:  Normal ROM.  Supple.  Physical Exam  Left Lower Extremity(s): knee lower leg, ankle and foot             Tenderness:  severe, calf ankle and foot, knee tenderness is not significant compared to the lower leg.             Swelling: Severe.        Calf:  Positive Anna's sign. Posterior calf tenderness present. Calf/Ankle edema is present..             Deformity: persistent plantar flexion.               ROM: diminished range with pain.               Skin:  significant edema, there is too much tenderness in the foot to palpate for pulses.  Doppler was obtained and the pulses were easily heard, strong.  Foot is warm and well-perfused.   Neurovascular:               Motor deficit: diminished dorsiflexion.  Patient has normal range of motion at the hip.  Patient has normal range of motion at the knee though there is pain.               Sensory deficit: none.                Pulse deficit: none.               Capillary refill: normal.  Gait:  unable to bear weight on the left foot..  Lymphatics:

## 2025-06-05 NOTE — PROGRESS NOTES
4 Eyes Skin Assessment     NAME:  Ingrid Hui  YOB: 1975  MEDICAL RECORD NUMBER:  [id number]    The patient is being assessed for  Admission    I agree that at least one RN has performed a thorough Head to Toe Skin Assessment on the patient. ALL assessment sites listed below have been assessed.      Areas assessed by both nurses:    Head, Face, Ears, Shoulders, Back, Chest, Arms, Elbows, Hands, Sacrum. Buttock, Coccyx, Ischium, and Legs. Feet and Heels        Does the Patient have a Wound? No noted wound(s)       Cuong Prevention initiated by RN: Yes  Wound Care Orders initiated by RN: No    Pressure Injury (Stage 3,4, Unstageable, DTI, NWPT, and Complex wounds) if present, place Wound referral order by RN under : No    New Ostomies, if present place, Ostomy referral order under : No     Nurse 1 eSignature: Electronically signed by Bhavani Carson RN on 6/5/25 at 9:56 AM EDT    **SHARE this note so that the co-signing nurse can place an eSignature**    Nurse 2 eSignature: {Esignature:[id number]}

## 2025-06-05 NOTE — H&P
[facility]  Resident History and Physical      CHIEF COMPLAINT:    Chief Complaint   Patient presents with    Leg Pain     Left, s/p injury x1 month ago, had MRI done today.         History of Present Illness:   Ingrid Hui  is a 50 y.o. female patient of Sourav Hoskins DO  with a pertinent PMHx of osteoarthritis, Takotsubo cardiomyopathy, NSTEMI, coronary artery disease, hyperlipidemia presented to the emergency department with the chief complaint of left leg swelling.     Patient reports having a mechanical fall about a month ago where she missed a step and fell down the stairs hitting her left knee to the ground.  She was evaluated in the emergency department 4/28/2025 and was diagnosed with sprain of left knee.  She was recommended to use a knee immobilizer.  Patient could not bear weight on her left lower extremity and was using crutches to move around.  She saw her PCP on 5/21/2025 who ordered an MRI of the left knee to rule out a possible ACL tear.  Patient came to the hospital to get the MRI done yesterday and also decided to come to the emergency department for worsening left lower extremity swelling.  Patient had a history of left hip replacement in the past.  She denies any history of a blood clot before.  She has not seen an orthopedic doctor since the day of her fall.  She complains of knee pain only when she moves her leg.  She was using ibuprofen initially however her PCP told her to use Tylenol instead.  Patient denies having any other systemic symptoms at this time such as chest pain, shortness of breath, abdominal pain, nausea, vomiting, troubles urinating or passing stool.  She admits to smoking about 1 pack of cigarettes a day and drinks alcohol socially.    ED course: Vitals were significant for an elevated blood pressure 173/109, rest stable.  Labs were significant for low hemoglobin of 8.6 which seems to be consistent with patient's baseline.   erythema.   Eyes:      Conjunctiva/sclera: Conjunctivae normal.      Pupils: Pupils are equal, round, and reactive to light.   Cardiovascular:      Rate and Rhythm: Normal rate and regular rhythm.      Pulses: Normal pulses.      Heart sounds: Normal heart sounds.   Pulmonary:      Effort: Pulmonary effort is normal.      Breath sounds: Normal breath sounds. No wheezing.   Abdominal:      General: Abdomen is flat.      Palpations: Abdomen is soft.   Musculoskeletal:         General: Tenderness (at the calf) present. Normal range of motion.      Cervical back: Normal range of motion.      Right lower leg: No edema.      Left lower leg: Edema (entire left leg including the ankle and dorsum of left foot) present.   Neurological:      General: No focal deficit present.      Mental Status: She is alert and oriented to person, place, and time.   Psychiatric:         Mood and Affect: Mood normal.         Behavior: Behavior normal.         LABS:  Recent Results (from the past 24 hours)   CBC with Auto Differential    Collection Time: 06/05/25  1:23 AM   Result Value Ref Range    WBC 5.9 4.5 - 11.5 k/uL    RBC 3.89 3.50 - 5.50 m/uL    Hemoglobin 8.6 (L) 11.5 - 15.5 g/dL    Hematocrit 28.4 (L) 34.0 - 48.0 %    MCV 73.0 (L) 80.0 - 99.9 fL    MCH 22.1 (L) 26.0 - 35.0 pg    MCHC 30.3 (L) 32.0 - 34.5 g/dL    RDW 18.7 (H) 11.5 - 15.0 %    Platelets 383 130 - 450 k/uL    MPV 8.8 7.0 - 12.0 fL    Neutrophils % 55 43.0 - 80.0 %    Lymphocytes % 31 20.0 - 42.0 %    Monocytes % 12 2.0 - 12.0 %    Eosinophils % 3 0 - 6 %    Basophils % 0 0.0 - 2.0 %    nRBC 1 per 100 WBC    Neutrophils Absolute 3.24 1.80 - 7.30 k/uL    Lymphocytes Absolute 1.81 1.50 - 4.00 k/uL    Monocytes Absolute 0.69 0.10 - 0.95 k/uL    Eosinophils Absolute 0.16 0.05 - 0.50 k/uL    Basophils Absolute 0.00 0.00 - 0.20 k/uL    RBC Morphology 1+ ACANTHOCYTES     RBC Morphology 1+ ANISOCYTOSIS     RBC Morphology 1+ HYPOCHROMIA     RBC Morphology 1+ OVALOCYTES     RBC

## 2025-06-05 NOTE — PROGRESS NOTES
Spiritual Health History and Assessment/Progress Note  [facility]    Initial Encounter, Attempted Encounter,  ,  ,      Name: Ingrid Hui MRN: [id number]    Age: 50 y.o.     Sex: female   Language: English   Restorationist: None   Left leg swelling     Date: 6/5/2025                           Spiritual Assessment began in [facility] 6W MED SURG        Referral/Consult From: Rounding   Encounter Overview/Reason: Initial Encounter, Attempted Encounter  Service Provided For: Patient, Patient not available    Day, Belief, Meaning:   Patient unable to assess at this time  Family/Friends No family/friends present      Importance and Influence:  Patient unable to assess at this time  Family/Friends No family/friends present    Community:  Patient feels well-supported. Support system includes: Spouse/Partner and Children  Family/Friends No family/friends present    Assessment and Plan of Care:     Patient Interventions include: Other: Unable to assess  Family/Friends Interventions include: No family/friends present    Patient Plan of Care: Spiritual Care available upon further referral  Family/Friends Plan of Care: No family/friends present    Electronically signed by Chaplain Nick on 6/5/2025 at 3:55 PM

## 2025-06-05 NOTE — CONSULTS
Department of Orthopedic Surgery  Resident Consult Note          Reason for Consult: Left leg pain    HISTORY OF PRESENT ILLNESS:       Patient is a 50 y.o. female who presents to [facility] due to left leg pain.  She states that about a month ago she fell down her stairs and had immediate left leg pain which she reported to the emergency department for.  There is no acute fracture seen on x-ray at that time so she was instructed to apply ice and was sent home with a knee immobilizer.  Later she received a left knee MRI at her next emergency department visit where there was a small nondisplaced tibial plateau fracture laterally.  She has had swelling about her left lower extremity which has gotten worse over the past couple days.  Venous duplex ultrasound demonstrated a large deep venous thrombosis about her left lower extremity.  She states that she has had trouble walking on her left lower extremity since the injury however she has been able to get by on crutches.  She is a former patient of Dr. Ochoa's where she received a total hip arthroplasty in 2022.  She states that she takes \"heart medications\" when asked about her past medical history.  Denies numbness/tingling/paresthesias.  Denies any other orthopedic complaints at this time.      Past Medical History:        Diagnosis Date    Chronic hip pain     H/O non-ST elevation myocardial infarction (NSTEMI) 06/15/2023    follows with Dr Kaur every 3 mos  .pt reports  next appt 10/27/23    Urinary incontinence      Past Surgical History:        Procedure Laterality Date    CARDIAC CATHETERIZATION  06/13/2023    COLONOSCOPY N/A 01/12/2022    COLORECTAL CANCER SCREENING, NOT HIGH RISK performed by Earnest Moon DO at [facility] ENDOSCOPY    COLONOSCOPY N/A 10/16/2023    COLONOSCOPY WITH BIOPSY performed by Mari Harrison MD at [facility] ENDOSCOPY    FRACTURE SURGERY Left     upper femur  (approx 2017)    HIP SURGERY Left 07/09/2016    IM

## 2025-06-05 NOTE — PROGRESS NOTES
[facility]  Progress Note    Chief complaint :  Chief Complaint   Patient presents with    Leg Pain     Left, s/p injury x1 month ago, had MRI done today.        Subjective:    No overnight problems.   Patient denies chest pain, SOB, nausea, vomiting, bloody stools, fever, chills, changes in urination, changes in BM.   Past medical, surgical, family and social history were reviewed, non-contributory, and unchanged unless otherwise stated.    Review of Systems  Negative unless stated otherwise     Objective:  /84   Pulse 75   Temp 97.9 °F (36.6 °C) (Oral)   Resp 18   Ht 1.626 m (5' 4\")   Wt 59 kg (130 lb)   SpO2 100%   BMI 22.31 kg/m²     Physical Exam    Vitals reviewed.   Constitutional:       General: She is not in acute distress.  HENT:      Head: Normocephalic and atraumatic.      Right Ear: External ear normal.      Left Ear: External ear normal.      Nose: No congestion or rhinorrhea.      Mouth/Throat:      Pharynx: No posterior oropharyngeal erythema.   Eyes:      Conjunctiva/sclera: Conjunctivae normal.      Pupils: Pupils are equal, round, and reactive to light.   Cardiovascular:      Rate and Rhythm: Normal rate and regular rhythm.      Pulses: Normal pulses.      Heart sounds: Normal heart sounds.   Pulmonary:      Effort: Pulmonary effort is normal.      Breath sounds: Normal breath sounds. No wheezing.   Abdominal:      General: Abdomen is flat.      Palpations: Abdomen is soft.   Musculoskeletal:         General: Tenderness (at the calf) present. Normal range of motion.      Cervical back: Normal range of motion.      Right lower leg: No edema.      Left lower leg: Edema (entire left leg including the ankle and dorsum of left foot) present. Hinged ROM at 0-30    Neurological:      General: No focal deficit present.      Mental Status: She is alert and oriented to person, place, and time.   Psychiatric:         Mood and Affect: Mood normal.          Immature Granulocytes Absolute PENDING 0.00 - 0.30 k/uL    Absolute Bands PENDING k/uL    Metamyelocytes Absolute PENDING 0 k/uL    Myelocytes Absolute PENDING 0 k/uL    Promyelocytes Absolute PENDING 0 k/uL    Blasts Absolute PENDING 0 k/uL    Atypical Lymphocytes Absolute PENDING k/uL    ABSOLUTE PLASMA CELLS PENDING k/uL    WBC Morphology PENDING     RBC Morphology PENDING     Platelet Estimate PENDING        Radiology and other tests reviewed:  Vascular duplex lower extremity venous left   Final Result   Left lower extremity extensive DVT extending from the common femoral vein   distally.  DVT is new compared to venous ultrasound exam 1 month earlier.         XR ANKLE LEFT (MIN 3 VIEWS)   Final Result   The left tibia and fibula are intact.  No acute fracture or dislocation of   the left ankle.         XR TIBIA FIBULA LEFT (2 VIEWS)   Final Result   The left tibia and fibula are intact.  No acute fracture or dislocation of   the left ankle.             Assessment:  Active Hospital Problems    Diagnosis Date Noted    Left leg swelling [M79.89] 06/05/2025    Acute deep vein thrombosis (DVT) of left lower extremity (HCC) [I82.402] 06/05/2025    Closed fracture of medial plateau of left tibia [S82.132A] 06/05/2025    Stress-induced cardiomyopathy [I51.81] 07/12/2023    Pure hypercholesterolemia [E78.00] 07/12/2023    Tobacco abuse [Z72.0] 06/15/2023    S/P total left hip arthroplasty [Z96.642] 02/08/2022    Iron deficiency anemia [D50.9] 02/08/2022    Osteoarthritis resulting from hip dysplasia on one side, left [M16.32] 09/09/2021       Plan:    Left nondisplaced tibial plateau fracture   Knee injury 2/2 to mechanical fall on 4/28. Xray of the left tibia, fibula and ankle negative for fracture. Had MRI left knee done yesterday but has not been read yet. Received percocet 5-325 mg x 1 in the ED.  - Ortho following,    - Conservative management   - PT/OT/SW, SNF rehab possibly  - Pain management with

## 2025-06-05 NOTE — PLAN OF CARE
Problem: ABCDS Injury Assessment  Goal: Absence of physical injury  Outcome: Progressing     Problem: Discharge Planning  Goal: Discharge to home or other facility with appropriate resources  Outcome: Progressing     Problem: Pain  Goal: Verbalizes/displays adequate comfort level or baseline comfort level  Outcome: Progressing     Problem: Safety - Adult  Goal: Free from fall injury  Outcome: Progressing     Problem: Cardiovascular - Adult  Goal: Maintains optimal cardiac output and hemodynamic stability  Outcome: Progressing  Goal: Absence of cardiac dysrhythmias or at baseline  Outcome: Progressing     Problem: Skin/Tissue Integrity - Adult  Goal: Skin integrity remains intact  Outcome: Progressing  Goal: Oral mucous membranes remain intact  Outcome: Progressing     Problem: Musculoskeletal - Adult  Goal: Return mobility to safest level of function  Outcome: Progressing  Goal: Maintain proper alignment of affected body part  Outcome: Progressing  Goal: Return ADL status to a safe level of function  Outcome: Progressing     Problem: Gastrointestinal - Adult  Goal: Maintains or returns to baseline bowel function  Outcome: Progressing  Goal: Maintains adequate nutritional intake  Outcome: Progressing     Problem: Hematologic - Adult  Goal: Maintains hematologic stability  Outcome: Progressing

## 2025-06-06 VITALS
HEART RATE: 74 BPM | HEIGHT: 64 IN | RESPIRATION RATE: 16 BRPM | OXYGEN SATURATION: 97 % | WEIGHT: 130 LBS | DIASTOLIC BLOOD PRESSURE: 93 MMHG | BODY MASS INDEX: 22.2 KG/M2 | SYSTOLIC BLOOD PRESSURE: 141 MMHG | TEMPERATURE: 98.6 F

## 2025-06-06 LAB
ALBUMIN SERPL-MCNC: 3.4 G/DL (ref 3.5–5.2)
ALP SERPL-CCNC: 75 U/L (ref 35–104)
ALT SERPL-CCNC: 16 U/L (ref 0–32)
ANION GAP SERPL CALCULATED.3IONS-SCNC: 12 MMOL/L (ref 7–16)
AST SERPL-CCNC: 23 U/L (ref 0–31)
BASOPHILS # BLD: 0.03 K/UL (ref 0–0.2)
BASOPHILS NFR BLD: 1 % (ref 0–2)
BILIRUB SERPL-MCNC: <0.2 MG/DL (ref 0–1.2)
BUN SERPL-MCNC: 9 MG/DL (ref 6–20)
CALCIUM SERPL-MCNC: 9.2 MG/DL (ref 8.6–10.2)
CHLORIDE SERPL-SCNC: 103 MMOL/L (ref 98–107)
CO2 SERPL-SCNC: 21 MMOL/L (ref 22–29)
CREAT SERPL-MCNC: 0.8 MG/DL (ref 0.5–1)
EOSINOPHIL # BLD: 0.1 K/UL (ref 0.05–0.5)
EOSINOPHILS RELATIVE PERCENT: 2 % (ref 0–6)
ERYTHROCYTE [DISTWIDTH] IN BLOOD BY AUTOMATED COUNT: 18.9 % (ref 11.5–15)
GFR, ESTIMATED: 88 ML/MIN/1.73M2
GLUCOSE SERPL-MCNC: 88 MG/DL (ref 74–99)
HCT VFR BLD AUTO: 28 % (ref 34–48)
HGB BLD-MCNC: 8 G/DL (ref 11.5–15.5)
IMM GRANULOCYTES # BLD AUTO: <0.03 K/UL (ref 0–0.58)
IMM GRANULOCYTES NFR BLD: 0 % (ref 0–5)
LYMPHOCYTES NFR BLD: 2.15 K/UL (ref 1.5–4)
LYMPHOCYTES RELATIVE PERCENT: 51 % (ref 20–42)
MCH RBC QN AUTO: 22.2 PG (ref 26–35)
MCHC RBC AUTO-ENTMCNC: 28.6 G/DL (ref 32–34.5)
MCV RBC AUTO: 77.6 FL (ref 80–99.9)
MONOCYTES NFR BLD: 0.56 K/UL (ref 0.1–0.95)
MONOCYTES NFR BLD: 13 % (ref 2–12)
NEUTROPHILS NFR BLD: 33 % (ref 43–80)
NEUTS SEG NFR BLD: 1.37 K/UL (ref 1.8–7.3)
PLATELET # BLD AUTO: 325 K/UL (ref 130–450)
PMV BLD AUTO: 9.3 FL (ref 7–12)
POTASSIUM SERPL-SCNC: 4.2 MMOL/L (ref 3.5–5)
PROT SERPL-MCNC: 6.4 G/DL (ref 6.4–8.3)
RBC # BLD AUTO: 3.61 M/UL (ref 3.5–5.5)
RBC # BLD: ABNORMAL 10*6/UL
SODIUM SERPL-SCNC: 136 MMOL/L (ref 132–146)
WBC OTHER # BLD: 4.2 K/UL (ref 4.5–11.5)

## 2025-06-06 PROCEDURE — 6370000000 HC RX 637 (ALT 250 FOR IP)

## 2025-06-06 PROCEDURE — 97161 PT EVAL LOW COMPLEX 20 MIN: CPT

## 2025-06-06 PROCEDURE — 36415 COLL VENOUS BLD VENIPUNCTURE: CPT

## 2025-06-06 PROCEDURE — 6360000002 HC RX W HCPCS

## 2025-06-06 PROCEDURE — 96372 THER/PROPH/DIAG INJ SC/IM: CPT

## 2025-06-06 PROCEDURE — 97530 THERAPEUTIC ACTIVITIES: CPT

## 2025-06-06 PROCEDURE — 2500000003 HC RX 250 WO HCPCS

## 2025-06-06 PROCEDURE — G0378 HOSPITAL OBSERVATION PER HR: HCPCS

## 2025-06-06 PROCEDURE — 99238 HOSP IP/OBS DSCHRG MGMT 30/<: CPT | Performed by: FAMILY MEDICINE

## 2025-06-06 PROCEDURE — 85025 COMPLETE CBC W/AUTO DIFF WBC: CPT

## 2025-06-06 PROCEDURE — 80053 COMPREHEN METABOLIC PANEL: CPT

## 2025-06-06 PROCEDURE — 97165 OT EVAL LOW COMPLEX 30 MIN: CPT

## 2025-06-06 RX ORDER — WARFARIN SODIUM 5 MG/1
TABLET ORAL
Qty: 30 TABLET | Refills: 0 | Status: SHIPPED | OUTPATIENT
Start: 2025-06-06 | End: 2025-06-06

## 2025-06-06 RX ORDER — OXYCODONE AND ACETAMINOPHEN 5; 325 MG/1; MG/1
1 TABLET ORAL EVERY 6 HOURS PRN
Qty: 20 TABLET | Refills: 0 | Status: SHIPPED | OUTPATIENT
Start: 2025-06-06 | End: 2025-07-06

## 2025-06-06 RX ORDER — VALSARTAN 40 MG/1
40 TABLET ORAL DAILY
Qty: 30 TABLET | Refills: 0 | Status: SHIPPED | OUTPATIENT
Start: 2025-06-07

## 2025-06-06 RX ORDER — WARFARIN SODIUM 5 MG/1
TABLET ORAL
Qty: 30 TABLET | Refills: 0 | Status: SHIPPED | OUTPATIENT
Start: 2025-06-06

## 2025-06-06 RX ADMIN — VALSARTAN 40 MG: 40 TABLET ORAL at 09:00

## 2025-06-06 RX ADMIN — FERROUS SULFATE TAB 325 MG (65 MG ELEMENTAL FE) 325 MG: 325 (65 FE) TAB at 09:00

## 2025-06-06 RX ADMIN — OXYCODONE HYDROCHLORIDE AND ACETAMINOPHEN 250 MG: 500 TABLET ORAL at 09:00

## 2025-06-06 RX ADMIN — OXYCODONE AND ACETAMINOPHEN 1 TABLET: 5; 325 TABLET ORAL at 09:00

## 2025-06-06 RX ADMIN — SODIUM CHLORIDE, PRESERVATIVE FREE 10 ML: 5 INJECTION INTRAVENOUS at 09:02

## 2025-06-06 RX ADMIN — POLYETHYLENE GLYCOL 3350 17 G: 17 POWDER, FOR SOLUTION ORAL at 08:59

## 2025-06-06 RX ADMIN — METOPROLOL SUCCINATE 25 MG: 25 TABLET, EXTENDED RELEASE ORAL at 09:01

## 2025-06-06 RX ADMIN — SPIRONOLACTONE 25 MG: 25 TABLET ORAL at 09:01

## 2025-06-06 RX ADMIN — ENOXAPARIN SODIUM 60 MG: 100 INJECTION SUBCUTANEOUS at 09:01

## 2025-06-06 ASSESSMENT — PAIN DESCRIPTION - ORIENTATION
ORIENTATION: LEFT
ORIENTATION: LEFT

## 2025-06-06 ASSESSMENT — PAIN SCALES - GENERAL
PAINLEVEL_OUTOF10: 5
PAINLEVEL_OUTOF10: 3
PAINLEVEL_OUTOF10: 8

## 2025-06-06 ASSESSMENT — PAIN - FUNCTIONAL ASSESSMENT
PAIN_FUNCTIONAL_ASSESSMENT: PREVENTS OR INTERFERES WITH ALL ACTIVE AND SOME PASSIVE ACTIVITIES
PAIN_FUNCTIONAL_ASSESSMENT: PREVENTS OR INTERFERES WITH ALL ACTIVE AND SOME PASSIVE ACTIVITIES

## 2025-06-06 ASSESSMENT — PAIN DESCRIPTION - DESCRIPTORS
DESCRIPTORS: THROBBING
DESCRIPTORS: THROBBING;DISCOMFORT;ACHING

## 2025-06-06 ASSESSMENT — PAIN DESCRIPTION - LOCATION
LOCATION: KNEE
LOCATION: KNEE

## 2025-06-06 ASSESSMENT — PAIN SCALES - WONG BAKER: WONGBAKER_NUMERICALRESPONSE: HURTS A LITTLE BIT

## 2025-06-06 ASSESSMENT — PAIN DESCRIPTION - PAIN TYPE: TYPE: ACUTE PAIN

## 2025-06-06 NOTE — PROGRESS NOTES
[facility]  Progress Note    Chief complaint :  Chief Complaint   Patient presents with    Leg Pain     Left, s/p injury x1 month ago, had MRI done today.        Subjective:    No overnight problems. Patient describes feeling unchanged. Patient denies chest pain, SOB, nausea, vomiting, bloody stools, fever, chills, changes in urination, changes in BM. Patient is tolerating diet. Saw patient this morning sitting up in bed eating her breakfast. She denied any new symptoms. She requested more pain medication.    Past medical, surgical, family and social history were reviewed, non-contributory, and unchanged unless otherwise stated.    Review of Systems  Negative    Objective:  /84   Pulse 75   Temp 97.9 °F (36.6 °C) (Oral)   Resp 18   Ht 1.626 m (5' 4\")   Wt 59 kg (130 lb)   SpO2 100%   BMI 22.31 kg/m²     Physical Exam  Patient not in acute distress  Cardiovascular: normal rate and rhythm; no murmurs  Pulmonary: clear to auscultation; no wheezing; normal effort  Extremities: Diffuse tenderness and edema throughout calf and dorsum of foot. Hinged ROM: 0-30    Labs:  Recent Results (from the past 24 hours)   Echo (TTE) complete (PRN contrast/bubble/strain/3D)    Collection Time: 06/05/25  2:59 PM   Result Value Ref Range    IVSd 0.7 0.6 - 0.9 cm    IVSs 1.2 cm    LVIDd 5.0 3.9 - 5.3 cm    LVIDs 3.7 cm    LVOT Diameter 2.0 cm    LVPWd 0.9 0.6 - 0.9 cm    LVPWs 1.7 cm    LV Mass 2D 135.8 67 - 162 g    LV Ejection Fraction A2C 53 %    LV Ejection Fraction A4C 54 %    LV ESV A2C 49 mL    LV ESV A4C 58 mL    LVOT Peak Gradient 3 mmHg    LVOT Mean Gradient 2 mmHg    LVOT SV 56.8 ml    LVOT Peak Velocity 0.9 m/s    LVOT VTI 18.1 cm    LV IVRT 106.1 ms    RVIDd 2.8 cm    RVSP 26 mmHg    LA Diameter 3.0 cm    LA Volume A/L 55 mL    LA Volume A-L A4C 57 (A) 22 - 52 mL    LA Volume A-L A4C 49 22 - 52 mL    LA Volume MOD A2C 54 (A) 22 - 52 mL    LA Volume MOD A4C 46 22 - 52 mL     Est. RA Pressure 3 mmHg    AV Cusp Mmode 2.0 cm    AV Area by Peak Velocity 2.1 cm2    AV Area by VTI 2.1 cm2    AV Peak Gradient 7 mmHg    AV Mean Gradient 4 mmHg    AV Peak Velocity 1.3 m/s    AV Mean Velocity 1.0 m/s    AV VTI 25.9 cm    MV \"A\" Wave Duration 138.4 msec    MV A Velocity 0.72 m/s    MV E Wave Deceleration Time 235.7 ms    MV E Velocity 0.98 m/s    MV Area by VTI 2.2 cm2    MV Peak Gradient 3 mmHg    MV Mean Gradient 2 mmHg    MV PHT 76.8 ms    MV Max Velocity 0.9 m/s    MV Mean Velocity 0.6 m/s    MV VTI 26.4 cm    MV Area by PHT 2.9 cm2    PV Peak Gradient 2 mmHg    PV Mean Gradient 1 mmHg    PV Max Velocity 0.6 m/s    PV Mean Velocity 0.5 m/s    PV VTI 12.1 cm    TR Peak Gradient 23 mmHg    TR Max Velocity 2.38 m/s    Ascending Aorta 3.0 cm    Pulm Vein A Velocity 0.3 m/s    Pulm Vein A Duration 147.6 ms    Pulm Vein Peak D Velocity 0.4 m/s    Pulm Vein Peak S Velocity 0.6 m/s    Fractional Shortening 2D 26 28 - 44 %    LV RWT Ratio 0.36     MV E/A 1.36     Pulm Vein S/D 1.5     LVOT Area 3.1 cm2    AV Velocity Ratio 0.69     LVOT:AV VTI Index 0.70     MV:LVOT VTI Index 1.46     E/E' Ratio (Averaged) 15.17     LV E' Lateral Velocity 7.00 cm/s    E/E' Lateral 14.00     LV E' Septal Velocity 6.00 cm/s    E/E' Septal 16.33     RV Basal Dimension 3.5 cm    RV Mid Dimension 2.9 cm    RV Longitudinal Dimension 6.7 cm    TAPSE 1.8 >=1.7 cm    AV Annulus 3.0 cm    EF Physician 45 %   Comprehensive Metabolic Panel w/ Reflex to MG    Collection Time: 06/06/25  4:40 AM   Result Value Ref Range    Sodium 136 132 - 146 mmol/L    Potassium 4.2 3.5 - 5.0 mmol/L    Chloride 103 98 - 107 mmol/L    CO2 21 (L) 22 - 29 mmol/L    Anion Gap 12 7 - 16 mmol/L    Glucose 88 74 - 99 mg/dL    BUN 9 6 - 20 mg/dL    Creatinine 0.8 0.50 - 1.00 mg/dL    Est, Glom Filt Rate 88 >60 mL/min/1.73m2    Calcium 9.2 8.6 - 10.2 mg/dL    Total Protein 6.4 6.4 - 8.3 g/dL    Albumin 3.4 (L) 3.5 - 5.2 g/dL    Total Bilirubin <0.2 0.0 - 1.2

## 2025-06-06 NOTE — PLAN OF CARE
Problem: ABCDS Injury Assessment  Goal: Absence of physical injury  6/6/2025 1203 by Bhavani Carson RN  Outcome: Completed  6/6/2025 0757 by Bhavani Carson RN  Outcome: Progressing     Problem: Discharge Planning  Goal: Discharge to home or other facility with appropriate resources  6/6/2025 1203 by Bhavani Carson RN  Outcome: Completed  6/6/2025 0757 by Bhavani Carson RN  Outcome: Progressing     Problem: Pain  Goal: Verbalizes/displays adequate comfort level or baseline comfort level  6/6/2025 1203 by Bhavani Carson RN  Outcome: Completed  6/6/2025 0757 by Bhavani Carson RN  Outcome: Progressing     Problem: Safety - Adult  Goal: Free from fall injury  6/6/2025 1203 by Bhavani Carsno RN  Outcome: Completed  6/6/2025 0757 by Bhavani Carson RN  Outcome: Progressing     Problem: Cardiovascular - Adult  Goal: Maintains optimal cardiac output and hemodynamic stability  6/6/2025 1203 by Bhavani Carson RN  Outcome: Completed  6/6/2025 0757 by Bhavani Carson RN  Outcome: Progressing  Goal: Absence of cardiac dysrhythmias or at baseline  6/6/2025 1203 by Bhavani Carson RN  Outcome: Completed  6/6/2025 0757 by Bhavani Carson RN  Outcome: Progressing     Problem: Skin/Tissue Integrity - Adult  Goal: Skin integrity remains intact  6/6/2025 1203 by Bhavani Carson RN  Outcome: Completed  6/6/2025 0757 by Bhavani Carson RN  Outcome: Progressing  Goal: Oral mucous membranes remain intact  6/6/2025 1203 by Bhavani Carson RN  Outcome: Completed  6/6/2025 0757 by Bhavani Carson RN  Outcome: Progressing     Problem: Musculoskeletal - Adult  Goal: Return mobility to safest level of function  6/6/2025 1203 by Bhavani Carson RN  Outcome: Completed  6/6/2025 0757 by Vujaklya, Wenda, RN  Outcome: Progressing  Goal: Maintain proper alignment of affected body part  6/6/2025 1203 by Bhavani Carson, RN  Outcome: Completed  6/6/2025 0757 by Bhavani Carson, RN  Outcome: Progressing  Goal: Return ADL status  follow urine out put follow urine out put

## 2025-06-06 NOTE — DISCHARGE SUMMARY
Physician Discharge Summary  [facility]     Patient ID:  Ingrid Hui  [id number]  50 y.o.  1975    Admit date: 6/5/2025    Discharge date: 06/06/25    Admission Diagnoses:   Difficulty in walking [R26.2]  Left leg swelling [M79.89]    Discharge Diagnoses:  Principal Problem:    Left leg swelling  Active Problems:    Osteoarthritis resulting from hip dysplasia on one side, left    S/P total left hip arthroplasty    Iron deficiency anemia    Tobacco abuse    Stress-induced cardiomyopathy    Pure hypercholesterolemia    Acute deep vein thrombosis (DVT) of left lower extremity (HCC)    Closed fracture of medial plateau of left tibia  Resolved Problems:    * No resolved hospital problems. *      Consults: orthopedics  Procedures: None    Hospital Course:     Ingrid Hui  is a 50 y.o. female patient of Sourav Hoskins DO  with a pertinent PMHx of osteoarthritis, Takotsubo cardiomyopathy, NSTEMI, coronary artery disease, hyperlipidemia presented to the emergency department with the chief complaint of left leg swelling.   In the ED, MRI done showed left nondisplaced tibial plateau fracture. Ortho was consulted and advised conservative management with close follow up outpatient with physical therapy. Left lower extremity duplex done showed extensive left sided DVT for which received Lovenox in the hospital. At time of discharge, patient was started on Eliquis and warfarin. Patient has financial issues, hence social work was consulted and patient was given a discount on the Eliquis starter pack. The patient was discharged on Eliquis starter pack and warfarin 5 mg once a day with plan to continue starter pack until therapeutic INR is reached.  Patient was on Entresto for cardiomyopathy but patient was not taking it at home. Hence switched to Diovan for financial reasons.   Other medications for the patient's chronic conditions were continued during the course of the  hospital admission.  Patient was discharged in a stable and improved condition.    Items to follow:  A) PCP - Patient was initiated on warfarin 5 mg and Eliquis starter pack at time of discharge with plan to close follow up outpatient for INR checks and eventually going off the Eliquis. B) Follow up with Ortho and physical therapy.     Significant Diagnostic Studies:   Vascular duplex lower extremity venous left   Final Result   Left lower extremity extensive DVT extending from the common femoral vein   distally.  DVT is new compared to venous ultrasound exam 1 month earlier.         XR ANKLE LEFT (MIN 3 VIEWS)   Final Result   The left tibia and fibula are intact.  No acute fracture or dislocation of   the left ankle.         XR TIBIA FIBULA LEFT (2 VIEWS)   Final Result   The left tibia and fibula are intact.  No acute fracture or dislocation of   the left ankle.              Medication changes: See below    Discharge Exam:     Vitals reviewed.   Constitutional:       General: She is not in acute distress.  HENT:      Head: Normocephalic and atraumatic.      Right Ear: External ear normal.      Left Ear: External ear normal.      Nose: No congestion or rhinorrhea.      Mouth/Throat:      Pharynx: No posterior oropharyngeal erythema.   Eyes:      Conjunctiva/sclera: Conjunctivae normal.      Pupils: Pupils are equal, round, and reactive to light.   Cardiovascular:      Rate and Rhythm: Normal rate and regular rhythm.      Pulses: Normal pulses.      Heart sounds: Normal heart sounds.   Pulmonary:      Effort: Pulmonary effort is normal.      Breath sounds: Normal breath sounds. No wheezing.   Abdominal:      General: Abdomen is flat.      Palpations: Abdomen is soft.   Musculoskeletal:         General: Tenderness (at the calf) present. Normal range of motion.      Cervical back: Normal range of motion.      Right lower leg: No edema.      Left lower leg: Edema (entire left leg including the ankle and dorsum of left

## 2025-06-06 NOTE — PROGRESS NOTES
Discharge instructions given to patient-verbalized understanding. Teaching done regarding Coumadin/diet/importance of taking as directed-verbalized understanding.

## 2025-06-06 NOTE — PROGRESS NOTES
CLINICAL PHARMACY NOTE: MEDS TO BEDS    Total # of Prescriptions Filled: 4   The following medications were delivered to the patient:  Percocet 5/325   Eliquis starter pack  Warfarin 5 mg  Vitamin c    Additional Documentation:

## 2025-06-06 NOTE — PROGRESS NOTES
Physical Therapy  Initial Assessment     Name: Ingrid Hui  : 1975  MRN: [id number]      Date of Service: 2025    Evaluating PT: Pawel Siu, PT, DPT [id number]      Room #:  0601/0601-A  Diagnosis:  Difficulty in walking [R26.2]  Left leg swelling [M79.89]  PMHx/PSHx:   has a past medical history of Chronic hip pain, H/O non-ST elevation myocardial infarction (NSTEMI), and Urinary incontinence.  Procedure/Surgery:  NA  Precautions:  Fall risk, L tibial plateau fx, NWB LLE, Hinged knee brace (0-30°)  Equipment Needs:  NA    SUBJECTIVE:    Pt lives with  in a 2 story house with 4 stair(s) and 2 wide rail(s) to enter. Full flight of stairs and 1 rail to second floor bed and bath. Pt ambulated with axillary crutches prior to admission. Pt also owns walker.    OBJECTIVE:   Initial Evaluation  Date: 25 Treatment Date: Short Term/ Long Term   Goals   AM-PAC 6 Clicks      Was pt agreeable to Eval/treatment? Yes     Does pt have pain? No complaints of pain     Bed Mobility  Rolling: NT  Supine to sit: NT  Sit to supine: SBA  Scooting: SBA  Rolling: Independent   Supine to sit: Independent   Sit to supine: Independent   Scooting: Independent    Transfers Sit to stand: SBA  Stand to sit: SBA  Stand pivot: SBA with axillary crutches, SBA with WW  Sit to stand: Independent   Stand to sit: Independent   Stand pivot: Mod Independent with WW   Ambulation   25 feet with WW with SBA  >200 feet with WW Mod Independent    Stair negotiation: ascended and descended NT  >12 step(s) with axillary crutches with Supervision   ROM BUE: Refer to OT note  BLE: WFL     Strength BUE: Refer to OT note  BLE: WFL     Balance Sitting EOB: Independent   Dynamic Standing: SBA with axillary crutches, SBA with WW  Dynamic Standing: Mod Independent with WW     Pt is A & O x: 4 to person, place, month/year, and situation.   Sensation: Denies numbness and tingling of extremities.   Edema: Unremarkable    Patient

## 2025-06-06 NOTE — PLAN OF CARE
Problem: ABCDS Injury Assessment  Goal: Absence of physical injury  6/5/2025 2056 by Janelle Meehan RN  Outcome: Progressing  6/5/2025 0932 by Bhavani Carson RN  Outcome: Progressing     Problem: Discharge Planning  Goal: Discharge to home or other facility with appropriate resources  6/5/2025 2056 by Janelle Meehan RN  Outcome: Progressing  Flowsheets (Taken 6/5/2025 0937 by Bhavani Carson RN)  Discharge to home or other facility with appropriate resources:   Identify barriers to discharge with patient and caregiver   Refer to discharge planning if patient needs post-hospital services based on physician order or complex needs related to functional status, cognitive ability or social support system  6/5/2025 0932 by Bhavani Carson RN  Outcome: Progressing     Problem: Pain  Goal: Verbalizes/displays adequate comfort level or baseline comfort level  6/5/2025 2056 by Janelle Meehan RN  Outcome: Progressing  6/5/2025 0932 by Bhavani Carson RN  Outcome: Progressing     Problem: Safety - Adult  Goal: Free from fall injury  6/5/2025 2056 by Janelle Meehan RN  Outcome: Progressing  6/5/2025 0932 by Bhavani Carson RN  Outcome: Progressing     Problem: Cardiovascular - Adult  Goal: Maintains optimal cardiac output and hemodynamic stability  6/5/2025 2056 by Janelle Meehan RN  Outcome: Progressing  6/5/2025 0932 by Bhavani Carson RN  Outcome: Progressing  Goal: Absence of cardiac dysrhythmias or at baseline  6/5/2025 2056 by Janelle Meehan RN  Outcome: Progressing  6/5/2025 0932 by Bhavani Carson RN  Outcome: Progressing     Problem: Skin/Tissue Integrity - Adult  Goal: Skin integrity remains intact  6/5/2025 2056 by Janelle Meehan RN  Outcome: Progressing  6/5/2025 0932 by Bhavani Carson RN  Outcome: Progressing  Goal: Oral mucous membranes remain intact  6/5/2025 2056 by Janelle Meehan RN  Outcome: Progressing  6/5/2025 0932 by Bhavani Carson, RADHA  Outcome:  Progressing     Problem: Musculoskeletal - Adult  Goal: Return mobility to safest level of function  6/5/2025 2056 by Janelle Meehan RN  Outcome: Progressing  Flowsheets (Taken 6/5/2025 0937 by Bhavani Carson RN)  Return Mobility to Safest Level of Function:   Assess patient stability and activity tolerance for standing, transferring and ambulating with or without assistive devices   Assist with transfers and ambulation using safe patient handling equipment as needed   Obtain physical therapy/occupational therapy consults as needed   Instruct patient/family in ordered activity level   Apply continuous passive motion per provider or physical therapy orders to increase flexion toward goal  6/5/2025 0932 by Bhavani Carson RN  Outcome: Progressing  Goal: Maintain proper alignment of affected body part  6/5/2025 2056 by Janelle Meehan RN  Outcome: Progressing  Flowsheets (Taken 6/5/2025 0937 by Bhavani Carson RN)  Maintain proper alignment of affected body part:   Support and protect limb and body alignment per provider's orders   Instruct and reinforce with patient and family use of appropriate assistive device and precautions (e.g. spinal or hip dislocation precautions)  6/5/2025 0932 by Bhavani Carson RN  Outcome: Progressing  Goal: Return ADL status to a safe level of function  6/5/2025 2056 by Janelle Mehean RN  Outcome: Progressing  Flowsheets (Taken 6/5/2025 0937 by Bhavani Carson RN)  Return ADL Status to a Safe Level of Function:   Administer medication as ordered   Assess activities of daily living deficits and provide assistive devices as needed   Obtain physical therapy/occupational therapy consults as needed   Assist and instruct patient to increase activity and self care as tolerated  6/5/2025 0932 by Bhavani Carson RN  Outcome: Progressing     Problem: Gastrointestinal - Adult  Goal: Maintains or returns to baseline bowel function  6/5/2025 2056 by Janelle Meehan,

## 2025-06-06 NOTE — PROGRESS NOTES
Occupational Therapy    OCCUPATIONAL THERAPY INITIAL EVALUATION    [facility]   [address], [place]         Date:2025                                                  Patient Name: Ingrid Hui    MRN: [id number]    : 1975    Room: [address] [place] [address]      Evaluating OT: Liseth Wright OTR/L   [id number]      Referring Provider:Nisha Mariee MD     Specific Provider Orders/Date:OT  eval and treat 2025      Diagnosis:  Difficulty in walking [R26.2]  Left leg swelling [M79.89]   Per ortho note:    IMPRESSION:  Left nondisplaced tibial plateau fracture   PLAN:  Nonweightbearing left lower extremity  Hinged ROM at 0-30  Patient found to have a large DVT in the left lower extremity.  Treatment per medicine    Pertinent Medical History: chronic hip pain L ROBEL 2022, NSTEMI      Precautions:  Fall Risk, NWB L LE     Assessment of current deficits    [x] Functional mobility  [x]ADLs  [x] Strength               []Cognition    [x] Functional transfers   [x] IADLs         [x] Safety Awareness   [x]Endurance    [] Fine Coordination              [x] Balance      [] Vision/perception   []Sensation     []Gross Motor Coordination  [] ROM  [] Delirium                   [] Motor Control     OT PLAN OF CARE   OT POC based on physician orders, patient diagnosis and results of clinical assessment    Frequency/Duration  1-2 days/wk  PRN   Specific OT Treatment Interventions to include:   ADL retraining/adapted techniques and AE recommendations to increase functional independence within precautions                    Energy conservation techniques to improve tolerance for selfcare routine   Functional transfer/mobility training/DME recommendations for increased independence, safety and fall prevention         Patient/family education to increase safety and functional independence             Environmental modifications for safe mobility and completion of ADLs                              Therapeutic activity to improve functional performance during ADLs.                                         Therapeutic exercise to improve tolerance and functional strength for ADLs    Balance retraining/tolerance tasks for facilitation of postural control with dynamic challenges during ADLs .      Positioning to improve functional independence      Recommended Adaptive Equipment: TBD     Home Living: Pt lives with , 2 story with steps to enter.  Bed /bath on 2nd    Bathroom setup: tub/shower, extended tub bench    Equipment owned: crutches, walker     Prior Level of Function: assist as needed  with ADLs , and  with IADLs; ambulated with crutches    Pain Level: no paint this session ;   Cognition: A&O: 4/4   Memory:  good    Sequencing:  good    Problem solving:  good    Judgement/safety:  good      Functional Assessment:  AM-PAC Daily Activity Raw Score: 18/24   Initial Eval Status  Date: 6/6/2025 Treatment Status  Date: STGs = LTGs  Time frame: 10-14 days   Feeding Independent     Grooming Independent     UB Dressing Independent     LB Dressing Assist with donning socks  L foot swelling observed   Patient able to bend to reach feet   Educated on dressing tech   Mod I    Bathing Supervision   Independent   Toileting Independent     Bed Mobility  Independent  Supine <> sit      Functional Transfers Supervision/Independent  Sit-stand from bed   Mod I    Functional Mobility Supervision , crutches   Steps next to bed   Able to maintain NWB precaution    Educated on walker tech also   Mod I  with good tolerance    Balance Sitting:     Static:  Independent    Dynamic:Independent  Standing: supervision   Independent   Activity Tolerance No SOB Observed   Good  with ADL activity    Visual/  Perceptual Glasses: none by bedside         UE ROM/strength  AROM present throughout   Tolerate UE therapeutic activity/exercises to increase strength/endurance for ADL/xfer activity       Hand Dominance

## 2025-06-06 NOTE — PLAN OF CARE
Problem: ABCDS Injury Assessment  Goal: Absence of physical injury  6/6/2025 0757 by Bhavani Carson RN  Outcome: Progressing  6/5/2025 2056 by Janelle Meehan RN  Outcome: Progressing     Problem: Discharge Planning  Goal: Discharge to home or other facility with appropriate resources  6/6/2025 0757 by Bhavani Carson RN  Outcome: Progressing  6/5/2025 2056 by Janelle Meehan RN  Outcome: Progressing  Flowsheets (Taken 6/5/2025 0937 by Bhavani Carson RN)  Discharge to home or other facility with appropriate resources:   Identify barriers to discharge with patient and caregiver   Refer to discharge planning if patient needs post-hospital services based on physician order or complex needs related to functional status, cognitive ability or social support system     Problem: Pain  Goal: Verbalizes/displays adequate comfort level or baseline comfort level  6/6/2025 0757 by Bhavani Carson RN  Outcome: Progressing  6/5/2025 2056 by Janelle Meehan RN  Outcome: Progressing     Problem: Safety - Adult  Goal: Free from fall injury  6/6/2025 0757 by Bhavani Carson RN  Outcome: Progressing  6/5/2025 2056 by Janelle Meehan RN  Outcome: Progressing     Problem: Cardiovascular - Adult  Goal: Maintains optimal cardiac output and hemodynamic stability  6/6/2025 0757 by Bhavani Carson RN  Outcome: Progressing  6/5/2025 2056 by Janelle Meehan RN  Outcome: Progressing  Goal: Absence of cardiac dysrhythmias or at baseline  6/6/2025 0757 by Bhavani Carson RN  Outcome: Progressing  6/5/2025 2056 by Janelle Meehan RN  Outcome: Progressing     Problem: Skin/Tissue Integrity - Adult  Goal: Skin integrity remains intact  6/6/2025 0757 by Bhavani Carson RN  Outcome: Progressing  6/5/2025 2056 by Janelle Meehan RN  Outcome: Progressing  Goal: Oral mucous membranes remain intact  6/6/2025 0757 by Bhavani Carson RN  Outcome: Progressing  6/5/2025 2056 by Janelle Meehan, RN  Outcome:  Progressing  6/5/2025 2056 by Janelle Meehan, RN  Outcome: Progressing  Goal: Maintains adequate nutritional intake  6/6/2025 0757 by Bhavani Carson RN  Outcome: Progressing  6/5/2025 2056 by Janelle Meehan RN  Outcome: Progressing     Problem: Hematologic - Adult  Goal: Maintains hematologic stability  6/6/2025 0757 by Bhavani Carson RN  Outcome: Progressing  6/5/2025 2056 by Janelle Meehan RN  Outcome: Progressing  Flowsheets (Taken 6/5/2025 0937 by Bhavani Carson RN)  Maintains hematologic stability:   Assess for signs and symptoms of bleeding or hemorrhage   Monitor labs for bleeding or clotting disorders   Administer blood products/factors as ordered

## 2025-06-06 NOTE — CARE COORDINATION
Ortho Surgery following, managing conservatively, knee brace received, NWB. Patient found to have a large DVT in the left lower extremity. Due to patient having financial hardship paying for medications CM recommendation to evaluate if patient is appropriate for coumadin opposed to Eliquis d/t cost. Discharge plan is home will do PT as OP.  Yin ROWLEY, RN  Case Management     Addendum: CM spoke with resident, they plan to discharge patient on Eliquis for 1 month free trial period then transition to Coumadin. Eliquis discount card provided.  Yin ROWLEY, RN  Case Management

## 2025-06-12 ENCOUNTER — HOSPITAL ENCOUNTER (OUTPATIENT)
Dept: PHYSICAL THERAPY | Age: 50
Setting detail: THERAPIES SERIES
Discharge: HOME | End: 2025-06-12

## 2025-06-12 NOTE — PROGRESS NOTES
[facility]                Phone: [phone]  Fax: [phone]    Physical Therapy  Cancellation/No-show Note  Patient Name:  Ingrid Hui  :  1975   Date:  2025    For today's appointment patient:  []  Cancelled  []  Rescheduled appointment  [x]  No-show     Reason given by patient:  []  Patient ill  []  Conflicting appointment  []  No transportation    []  Conflict with work  [x]  No reason given  []  Other:     Comments:  pt was a no-show for initial evaluation.     Electronically signed by:  Geraldo Whatley PT

## 2025-07-15 ENCOUNTER — TELEPHONE (OUTPATIENT)
Age: 50
End: 2025-07-15

## 2025-07-15 NOTE — TELEPHONE ENCOUNTER
patient called office requesting appointment for ED follow up.    ED visit date:6/5/2025    ED Location: [facility]    Diagnosis/Injury:   left leg     FINDINGS:  Left tibia and fibula:     The left tibia and fibula are intact.     Left ankle:     The ankle mortise is intact.  There is diffuse soft tissue swelling about the  ankle.  Diffuse swelling of the dorsal forefoot.  No acute fracture or  dislocation.  Achilles tendon shadow is intact.     IMPRESSION:  The left tibia and fibula are intact.  No acute fracture or dislocation of  the left ankle    Provider on call: Dr Hercules  but Dr Ochoa patient    Routed to providers for recommendations.    Future Appointments   Date Time Provider Department Center   7/17/2025  7:45 AM Sourav Hoskins DO [facility] DE[facility]

## 2025-07-15 NOTE — TELEPHONE ENCOUNTER
Call placed to patient at this time to schedule appointment per provider recommendations. Patient not available. Left message with family member that answered the call to have patient call us back to schedule.      Future Appointments   Date Time Provider Department Center   7/17/2025  7:45 AM Sourav Hoskins DO [facility]       Electronically signed by Juliane Fofana ATC on 7/15/2025 at 4:47 PM

## 2025-07-17 ENCOUNTER — OFFICE VISIT (OUTPATIENT)
Dept: PRIMARY CARE CLINIC | Age: 50
End: 2025-07-17
Payer: COMMERCIAL

## 2025-07-17 VITALS
TEMPERATURE: 97.3 F | HEART RATE: 83 BPM | OXYGEN SATURATION: 100 % | HEIGHT: 64 IN | SYSTOLIC BLOOD PRESSURE: 140 MMHG | WEIGHT: 134 LBS | DIASTOLIC BLOOD PRESSURE: 100 MMHG | RESPIRATION RATE: 14 BRPM | BODY MASS INDEX: 22.88 KG/M2

## 2025-07-17 DIAGNOSIS — I21.4 NSTEMI (NON-ST ELEVATED MYOCARDIAL INFARCTION) (HCC): ICD-10-CM

## 2025-07-17 DIAGNOSIS — I51.81 TAKOTSUBO CARDIOMYOPATHY: ICD-10-CM

## 2025-07-17 DIAGNOSIS — D50.9 IRON DEFICIENCY ANEMIA, UNSPECIFIED IRON DEFICIENCY ANEMIA TYPE: ICD-10-CM

## 2025-07-17 DIAGNOSIS — G47.00 INSOMNIA, UNSPECIFIED TYPE: ICD-10-CM

## 2025-07-17 DIAGNOSIS — S82.132S CLOSED FRACTURE OF MEDIAL PORTION OF LEFT TIBIAL PLATEAU, SEQUELA: ICD-10-CM

## 2025-07-17 DIAGNOSIS — I82.4Y2 ACUTE DEEP VEIN THROMBOSIS (DVT) OF PROXIMAL VEIN OF LEFT LOWER EXTREMITY (HCC): Primary | ICD-10-CM

## 2025-07-17 LAB
INTERNATIONAL NORMALIZATION RATIO, POC: 1.1
PROTHROMBIN TIME, POC: 12.7

## 2025-07-17 PROCEDURE — G8420 CALC BMI NORM PARAMETERS: HCPCS | Performed by: STUDENT IN AN ORGANIZED HEALTH CARE EDUCATION/TRAINING PROGRAM

## 2025-07-17 PROCEDURE — G8427 DOCREV CUR MEDS BY ELIG CLIN: HCPCS | Performed by: STUDENT IN AN ORGANIZED HEALTH CARE EDUCATION/TRAINING PROGRAM

## 2025-07-17 PROCEDURE — 3017F COLORECTAL CA SCREEN DOC REV: CPT | Performed by: STUDENT IN AN ORGANIZED HEALTH CARE EDUCATION/TRAINING PROGRAM

## 2025-07-17 PROCEDURE — G2211 COMPLEX E/M VISIT ADD ON: HCPCS | Performed by: STUDENT IN AN ORGANIZED HEALTH CARE EDUCATION/TRAINING PROGRAM

## 2025-07-17 PROCEDURE — 99214 OFFICE O/P EST MOD 30 MIN: CPT | Performed by: STUDENT IN AN ORGANIZED HEALTH CARE EDUCATION/TRAINING PROGRAM

## 2025-07-17 PROCEDURE — 85610 PROTHROMBIN TIME: CPT | Performed by: STUDENT IN AN ORGANIZED HEALTH CARE EDUCATION/TRAINING PROGRAM

## 2025-07-17 PROCEDURE — 36415 COLL VENOUS BLD VENIPUNCTURE: CPT | Performed by: STUDENT IN AN ORGANIZED HEALTH CARE EDUCATION/TRAINING PROGRAM

## 2025-07-17 PROCEDURE — 4004F PT TOBACCO SCREEN RCVD TLK: CPT | Performed by: STUDENT IN AN ORGANIZED HEALTH CARE EDUCATION/TRAINING PROGRAM

## 2025-07-17 RX ORDER — METOPROLOL SUCCINATE 25 MG/1
25 TABLET, EXTENDED RELEASE ORAL DAILY
Qty: 30 TABLET | Refills: 3 | Status: SHIPPED | OUTPATIENT
Start: 2025-07-17

## 2025-07-17 RX ORDER — SPIRONOLACTONE 25 MG/1
25 TABLET ORAL DAILY
Qty: 30 TABLET | Refills: 3 | Status: SHIPPED | OUTPATIENT
Start: 2025-07-17

## 2025-07-17 RX ORDER — VALSARTAN 40 MG/1
40 TABLET ORAL DAILY
Qty: 30 TABLET | Refills: 0 | Status: SHIPPED | OUTPATIENT
Start: 2025-07-17

## 2025-07-17 RX ORDER — FERROUS SULFATE 325(65) MG
325 TABLET ORAL
Qty: 90 TABLET | Refills: 1 | Status: SHIPPED | OUTPATIENT
Start: 2025-07-17

## 2025-07-17 RX ORDER — MIRTAZAPINE 7.5 MG/1
7.5 TABLET, FILM COATED ORAL NIGHTLY
Qty: 90 TABLET | Refills: 1 | Status: SHIPPED | OUTPATIENT
Start: 2025-07-17

## 2025-07-17 NOTE — PROGRESS NOTES
[facility]  Department of Family Medicine      Patient:  Ingrid Hui 50 y.o. female     Date of Service: 7/17/25      Chief complaint:   Chief Complaint   Patient presents with    Follow-up         History ofPresent Illness   The patient is a 50 y.o. female  presented to the clinic with complaints as above.    DVT/tibial fracture  -HFU  -was on eliquis and transitiioning to warfarin  -stopped eliquis 3 days and also not on warfarin for 3 days   -currently, feeling ok, swelling in leg improving   -unclear when seeing ortho, pain improving   -denies any chest pain or SOB, out of all her meds for 3 days     Past Medical History:      Diagnosis Date    Chronic hip pain     H/O non-ST elevation myocardial infarction (NSTEMI) 06/15/2023    follows with Dr Kaur every 3 mos  .pt reports  next appt 10/27/23    Urinary incontinence        PastSurgical History:        Procedure Laterality Date    CARDIAC CATHETERIZATION  06/13/2023    COLONOSCOPY N/A 01/12/2022    COLORECTAL CANCER SCREENING, NOT HIGH RISK performed by Earnest Moon DO at [facility] ENDOSCOPY    COLONOSCOPY N/A 10/16/2023    COLONOSCOPY WITH BIOPSY performed by Mari Harrison MD at [facility] ENDOSCOPY    FRACTURE SURGERY Left     upper femur  (approx 2017)    HIP SURGERY Left 07/09/2016    IM NAILING LEFT HIP    TOTAL HIP ARTHROPLASTY Left 02/08/2022    CONVERSION OF PRIOR HIP SURGERY TO LEFT TOTAL HIP ARTHROPLASTY ROBOTIC ASSISTED CIARA performed by Bernardo Ochoa DO at [facility] OR    TUBAL LIGATION         Allergies:    Patient has no known allergies.    Social History:   Social History     Socioeconomic History    Marital status:      Spouse name: audrey    Number of children: 6    Years of education: Not on file    Highest education level: Not on file   Occupational History    Not on file   Tobacco Use    Smoking status: Every Day     Current packs/day: 1.00     Average packs/day: 1 pack/day for 10.0 years (10.0

## 2025-07-17 NOTE — TELEPHONE ENCOUNTER
Call placed to patient at this time to schedule appointment per provider recommendations. Left message for patient to call back to office to schedule.    Future Appointments   Date Time Provider Department Center   8/5/2025  9:45 AM Sourav Hoskins DO [facility] DEP       Electronically signed by Juliane Fofana ATC on 7/17/2025 at 3:03 PM

## (undated) DEVICE — SOLUTION IV IRRIG POUR BRL 0.9% SODIUM CHL 2F7124

## (undated) DEVICE — TRAY LAMBOTS REUSABLE

## (undated) DEVICE — STRYKER PERFORMANCE SERIES SAGITTAL BLADE: Brand: STRYKER PERFORMANCE SERIES

## (undated) DEVICE — TRAY HIP EXTRAS REUSABLE

## (undated) DEVICE — TRAY ORTHO2 REUSABLE

## (undated) DEVICE — DECANTER: Brand: UNBRANDED

## (undated) DEVICE — GAUZE,SPONGE,4"X4",8PLY,STRL,LF,10/TRAY: Brand: MEDLINE

## (undated) DEVICE — COVER HNDL LT DISP

## (undated) DEVICE — INSTRUMENT CORE REAMERS STRYKER REUSABLE

## (undated) DEVICE — STANDARD HYPODERMIC NEEDLE,POLYPROPYLENE HUB: Brand: MONOJECT

## (undated) DEVICE — KIT TRK HIP PROC VIZADISC

## (undated) DEVICE — 3M™ IOBAN™ 2 ANTIMICROBIAL INCISE DRAPE 6650EZ: Brand: IOBAN™ 2

## (undated) DEVICE — GOWN,SIRUS,POLYRNF,BRTHSLV,2XL,18/CS: Brand: MEDLINE

## (undated) DEVICE — KIT PLT RATIO DISPNS KT 2IN CANN TIP SPRY TIP DISP MAGELLAN

## (undated) DEVICE — TRAY STRYKER MAKO TOTAL HIP ARRAY

## (undated) DEVICE — Z DISCONTINUED USE 2275686 GLOVE SURG SZ 8 L12IN FNGR THK13MIL WHT ISOLEX POLYISOPRENE

## (undated) DEVICE — SYRINGE MED 50ML LUERLOCK TIP

## (undated) DEVICE — DRESSING COMP W4XL4IN N ADH PD W2.5XL2.5IN GZ BORDERED ADH

## (undated) DEVICE — TUBING, SUCTION, 9/32" X 10', STRAIGHT: Brand: MEDLINE

## (undated) DEVICE — DOUBLE BASIN SET: Brand: MEDLINE INDUSTRIES, INC.

## (undated) DEVICE — TRAY STRYKER TRIDENT ACETABULAR SYSTEM REUSABLE

## (undated) DEVICE — Device

## (undated) DEVICE — 4-PORT MANIFOLD: Brand: NEPTUNE 2

## (undated) DEVICE — GRADUATE

## (undated) DEVICE — Z INACTIVE USE 2660664 SOLUTION IRRIG 3000ML 0.9% SOD CHL USP UROMATIC PLAS CONT

## (undated) DEVICE — DRAPE,REIN 53X77,STERILE: Brand: MEDLINE

## (undated) DEVICE — APPLICATOR PREP 26ML 0.7% IOD POVACRYLEX 74% ISO ALC ST

## (undated) DEVICE — PEEL-AWAY HOOD: Brand: FLYTE, SURGICOOL

## (undated) DEVICE — 3M™ COBAN™ NL STERILE NON-LATEX SELF-ADHERENT WRAP, 2084S, 4 IN X 5 YD (10 CM X 4,5 M), 18 ROLLS/CASE: Brand: 3M™ COBAN™

## (undated) DEVICE — PACK PROCEDURE SURG GEN CUST

## (undated) DEVICE — FORCEPS BX L240CM JAW DIA2.4MM ORNG L CAP W/ NDL DISP RAD

## (undated) DEVICE — GLOVE SURG SZ 8 CRM LTX FREE POLYISOPRENE POLYMER BEAD ANTI

## (undated) DEVICE — INSTRUMENT CLAMP TOWEL LARGE REUSABLE

## (undated) DEVICE — KIT SURG W7XL11IN 2 PKT UNTREATED NA

## (undated) DEVICE — SOLUTION IV IRRIG WATER 1000ML POUR BRL 2F7114

## (undated) DEVICE — TAPE ADH W3INXL10YD WHT COT WVN BK POWERFUL RUB BASE HIGHLY

## (undated) DEVICE — TOWEL,OR,DSP,ST,BLUE,STD,6/PK,12PK/CS: Brand: MEDLINE

## (undated) DEVICE — TRAY STRYKER MAKO UNI HIP POWER

## (undated) DEVICE — GRADUATE TRIANG MEASURE 1000ML BLK PRNT

## (undated) DEVICE — SPONGE LAP W18XL18IN WHT COT 4 PLY FLD STRUNG RADPQ DISP ST

## (undated) DEVICE — SPONGE GZ W4XL4IN RAYON POLY CVR W/NONWOVEN FAB STRL 2/PK

## (undated) DEVICE — 3M™ IOBAN™ 2 ANTIMICROBIAL INCISE DRAPE 6651EZ: Brand: IOBAN™ 2

## (undated) DEVICE — GLOVE ORANGE PI 8   MSG9080

## (undated) DEVICE — INSTRUMENT SYSTEM 7 BATTERY REUSABLE

## (undated) DEVICE — MARKER RAD 3.5MM HEX CKPT STEREOTAXIC IMAG LESION LOC FOR

## (undated) DEVICE — TUBE IRRIG HNDPC HI FLO TP INTRPULS W/SUCTION TUBE

## (undated) DEVICE — PIN BNE FIX TEMP L170MM DIA4MM MAKO

## (undated) DEVICE — PILLOW POS W15XH6XL22IN RASPBERRY FOAM ABD W/ STRP DISP FOR

## (undated) DEVICE — CLOTH SURG PREP PREOPERATIVE CHLORHEXIDINE GLUC 2% READYPREP

## (undated) DEVICE — SPONGE GZ W4XL4IN RAYON POLY FILL CVR W/ NONWOVEN FAB

## (undated) DEVICE — Z DISCONTINUED USE 2744636  DRESSING AQUACEL 14 IN ALG W3.5XL14IN POLYUR FLM CVR W/ HYDRCOLL

## (undated) DEVICE — GOWN,SIRUS,FABRNF,XL,20/CS: Brand: MEDLINE

## (undated) DEVICE — ELECTRODE PT RET AD L9FT HI MOIST COND ADH HYDRGEL CORDED

## (undated) DEVICE — DRAPE,TOP,102X53,STERILE: Brand: MEDLINE

## (undated) DEVICE — 3M™ STERI-DRAPE™ U-DRAPE 1015: Brand: STERI-DRAPE™

## (undated) DEVICE — KIT DRP FOR RIO ROBOTIC ARM ASST SYS

## (undated) DEVICE — HEWSON SUTURE RETRIEVER: Brand: HEWSON SUTURE RETRIEVER

## (undated) DEVICE — TOTAL HIP PK

## (undated) DEVICE — TRAY ACCULADE 2 BROACH HIP TRAY  REUSABLE

## (undated) DEVICE — TRAY ACCOLADE BASIL HIP TRAY  REUSABLE

## (undated) DEVICE — GOWN,SIRUS,POLYRNF,BRTHSLV,XLN/XL,20/CS: Brand: MEDLINE

## (undated) DEVICE — SET ORTHO STD STORTSTD1

## (undated) DEVICE — 1000 S-DRAPE TOWEL DRAPE 10/BX: Brand: STERI-DRAPE™

## (undated) DEVICE — BLADE ES L6IN ELASTOMERIC COAT EXT DURABLE BEND UPTO 90DEG

## (undated) DEVICE — C-ARM: Brand: UNBRANDED